# Patient Record
Sex: MALE | Race: WHITE | Employment: UNEMPLOYED | URBAN - METROPOLITAN AREA
[De-identification: names, ages, dates, MRNs, and addresses within clinical notes are randomized per-mention and may not be internally consistent; named-entity substitution may affect disease eponyms.]

---

## 2022-01-01 ENCOUNTER — APPOINTMENT (OUTPATIENT)
Dept: GENERAL RADIOLOGY | Age: 77
DRG: 871 | End: 2022-01-01
Payer: MEDICARE

## 2022-01-01 ENCOUNTER — HOSPITAL ENCOUNTER (INPATIENT)
Age: 77
LOS: 17 days | DRG: 871 | End: 2022-01-31
Attending: EMERGENCY MEDICINE | Admitting: INTERNAL MEDICINE
Payer: MEDICARE

## 2022-01-01 VITALS
TEMPERATURE: 96.6 F | HEIGHT: 74 IN | WEIGHT: 156.97 LBS | BODY MASS INDEX: 20.14 KG/M2 | SYSTOLIC BLOOD PRESSURE: 48 MMHG | DIASTOLIC BLOOD PRESSURE: 32 MMHG | OXYGEN SATURATION: 98 %

## 2022-01-01 DIAGNOSIS — U07.1 COVID-19: Primary | ICD-10-CM

## 2022-01-01 DIAGNOSIS — R53.1 GENERALIZED WEAKNESS: ICD-10-CM

## 2022-01-01 DIAGNOSIS — J96.01 ACUTE RESPIRATORY FAILURE WITH HYPOXIA (HCC): ICD-10-CM

## 2022-01-01 DIAGNOSIS — J18.9 ATYPICAL PNEUMONIA: ICD-10-CM

## 2022-01-01 LAB
A/G RATIO: 0.6 (ref 1.1–2.2)
ABO/RH: NORMAL
ABO/RH: NORMAL
ALBUMIN SERPL-MCNC: 2.6 G/DL (ref 3.4–5)
ALBUMIN SERPL-MCNC: 2.7 G/DL (ref 3.1–4.9)
ALBUMIN SERPL-MCNC: 2.7 G/DL (ref 3.4–5)
ALBUMIN SERPL-MCNC: 2.8 G/DL (ref 3.4–5)
ALBUMIN SERPL-MCNC: 2.8 G/DL (ref 3.4–5)
ALBUMIN SERPL-MCNC: 2.9 G/DL (ref 3.4–5)
ALBUMIN SERPL-MCNC: 3 G/DL (ref 3.4–5)
ALBUMIN SERPL-MCNC: 3.1 G/DL (ref 3.4–5)
ALBUMIN SERPL-MCNC: 3.3 G/DL (ref 3.4–5)
ALP BLD-CCNC: 101 U/L (ref 40–129)
ALP BLD-CCNC: 102 U/L (ref 40–129)
ALP BLD-CCNC: 102 U/L (ref 40–129)
ALP BLD-CCNC: 104 U/L (ref 40–129)
ALP BLD-CCNC: 104 U/L (ref 40–129)
ALP BLD-CCNC: 105 U/L (ref 40–129)
ALP BLD-CCNC: 107 U/L (ref 40–129)
ALP BLD-CCNC: 109 U/L (ref 40–129)
ALP BLD-CCNC: 114 U/L (ref 40–129)
ALP BLD-CCNC: 120 U/L (ref 40–129)
ALP BLD-CCNC: 137 U/L (ref 40–129)
ALP BLD-CCNC: 140 U/L (ref 40–129)
ALP BLD-CCNC: 142 U/L (ref 40–129)
ALP BLD-CCNC: 184 U/L (ref 40–129)
ALP BLD-CCNC: 99 U/L (ref 40–129)
ALPHA-1-GLOBULIN: 0.2 G/DL (ref 0.2–0.4)
ALPHA-2-GLOBULIN: 0.6 G/DL (ref 0.4–1.1)
ALT SERPL-CCNC: 137 U/L (ref 10–40)
ALT SERPL-CCNC: 15 U/L (ref 10–40)
ALT SERPL-CCNC: 17 U/L (ref 10–40)
ALT SERPL-CCNC: 18 U/L (ref 10–40)
ALT SERPL-CCNC: 20 U/L (ref 10–40)
ALT SERPL-CCNC: 20 U/L (ref 10–40)
ALT SERPL-CCNC: 21 U/L (ref 10–40)
ALT SERPL-CCNC: 23 U/L (ref 10–40)
ALT SERPL-CCNC: 23 U/L (ref 10–40)
ALT SERPL-CCNC: 24 U/L (ref 10–40)
ALT SERPL-CCNC: 26 U/L (ref 10–40)
ALT SERPL-CCNC: 28 U/L (ref 10–40)
ALT SERPL-CCNC: 31 U/L (ref 10–40)
ALT SERPL-CCNC: 62 U/L (ref 10–40)
ALT SERPL-CCNC: 78 U/L (ref 10–40)
ANION GAP SERPL CALCULATED.3IONS-SCNC: 10 MMOL/L (ref 3–16)
ANION GAP SERPL CALCULATED.3IONS-SCNC: 12 MMOL/L (ref 3–16)
ANION GAP SERPL CALCULATED.3IONS-SCNC: 12 MMOL/L (ref 3–16)
ANION GAP SERPL CALCULATED.3IONS-SCNC: 13 MMOL/L (ref 3–16)
ANION GAP SERPL CALCULATED.3IONS-SCNC: 14 MMOL/L (ref 3–16)
ANION GAP SERPL CALCULATED.3IONS-SCNC: 15 MMOL/L (ref 3–16)
ANION GAP SERPL CALCULATED.3IONS-SCNC: 16 MMOL/L (ref 3–16)
ANION GAP SERPL CALCULATED.3IONS-SCNC: 18 MMOL/L (ref 3–16)
ANION GAP SERPL CALCULATED.3IONS-SCNC: 23 MMOL/L (ref 3–16)
ANION GAP SERPL CALCULATED.3IONS-SCNC: 7 MMOL/L (ref 3–16)
ANION GAP SERPL CALCULATED.3IONS-SCNC: 8 MMOL/L (ref 3–16)
ANION GAP SERPL CALCULATED.3IONS-SCNC: 8 MMOL/L (ref 3–16)
ANION GAP SERPL CALCULATED.3IONS-SCNC: 9 MMOL/L (ref 3–16)
ANION GAP SERPL CALCULATED.3IONS-SCNC: 9 MMOL/L (ref 3–16)
ANTIBODY SCREEN: NORMAL
ANTIBODY SCREEN: NORMAL
AST SERPL-CCNC: 13 U/L (ref 15–37)
AST SERPL-CCNC: 13 U/L (ref 15–37)
AST SERPL-CCNC: 141 U/L (ref 15–37)
AST SERPL-CCNC: 15 U/L (ref 15–37)
AST SERPL-CCNC: 16 U/L (ref 15–37)
AST SERPL-CCNC: 17 U/L (ref 15–37)
AST SERPL-CCNC: 19 U/L (ref 15–37)
AST SERPL-CCNC: 27 U/L (ref 15–37)
AST SERPL-CCNC: 30 U/L (ref 15–37)
AST SERPL-CCNC: 30 U/L (ref 15–37)
AST SERPL-CCNC: 35 U/L (ref 15–37)
AST SERPL-CCNC: 59 U/L (ref 15–37)
AST SERPL-CCNC: 67 U/L (ref 15–37)
BASE EXCESS ARTERIAL: 5.5 MMOL/L (ref -3–3)
BASOPHILS ABSOLUTE: 0 K/UL (ref 0–0.2)
BASOPHILS ABSOLUTE: 0 K/UL (ref 0–0.2)
BASOPHILS RELATIVE PERCENT: 0.2 %
BASOPHILS RELATIVE PERCENT: 0.2 %
BETA GLOBULIN: 0.9 G/DL (ref 0.9–1.6)
BILIRUB SERPL-MCNC: 0.5 MG/DL (ref 0–1)
BILIRUB SERPL-MCNC: 0.6 MG/DL (ref 0–1)
BILIRUB SERPL-MCNC: 0.7 MG/DL (ref 0–1)
BILIRUB SERPL-MCNC: 0.7 MG/DL (ref 0–1)
BILIRUB SERPL-MCNC: 0.9 MG/DL (ref 0–1)
BILIRUB SERPL-MCNC: 1 MG/DL (ref 0–1)
BILIRUB SERPL-MCNC: 1.1 MG/DL (ref 0–1)
BILIRUB SERPL-MCNC: 1.1 MG/DL (ref 0–1)
BILIRUB SERPL-MCNC: 1.3 MG/DL (ref 0–1)
BILIRUB SERPL-MCNC: 1.4 MG/DL (ref 0–1)
BILIRUB SERPL-MCNC: 1.4 MG/DL (ref 0–1)
BILIRUB SERPL-MCNC: 1.5 MG/DL (ref 0–1)
BILIRUBIN DIRECT: 0.3 MG/DL (ref 0–0.3)
BILIRUBIN DIRECT: 0.4 MG/DL (ref 0–0.3)
BILIRUBIN DIRECT: 0.4 MG/DL (ref 0–0.3)
BILIRUBIN DIRECT: 0.5 MG/DL (ref 0–0.3)
BILIRUBIN DIRECT: 0.5 MG/DL (ref 0–0.3)
BILIRUBIN DIRECT: 0.6 MG/DL (ref 0–0.3)
BILIRUBIN DIRECT: 0.7 MG/DL (ref 0–0.3)
BILIRUBIN DIRECT: 0.8 MG/DL (ref 0–0.3)
BILIRUBIN DIRECT: <0.2 MG/DL (ref 0–0.3)
BILIRUBIN URINE: NEGATIVE
BILIRUBIN, INDIRECT: 0.3 MG/DL (ref 0–1)
BILIRUBIN, INDIRECT: 0.3 MG/DL (ref 0–1)
BILIRUBIN, INDIRECT: 0.4 MG/DL (ref 0–1)
BILIRUBIN, INDIRECT: 0.6 MG/DL (ref 0–1)
BILIRUBIN, INDIRECT: 0.8 MG/DL (ref 0–1)
BILIRUBIN, INDIRECT: ABNORMAL MG/DL (ref 0–1)
BLOOD BANK DISPENSE STATUS: NORMAL
BLOOD BANK DISPENSE STATUS: NORMAL
BLOOD BANK PRODUCT CODE: NORMAL
BLOOD BANK PRODUCT CODE: NORMAL
BLOOD, URINE: NEGATIVE
BPU ID: NORMAL
BPU ID: NORMAL
BUN BLDV-MCNC: 108 MG/DL (ref 7–20)
BUN BLDV-MCNC: 21 MG/DL (ref 7–20)
BUN BLDV-MCNC: 27 MG/DL (ref 7–20)
BUN BLDV-MCNC: 29 MG/DL (ref 7–20)
BUN BLDV-MCNC: 31 MG/DL (ref 7–20)
BUN BLDV-MCNC: 31 MG/DL (ref 7–20)
BUN BLDV-MCNC: 32 MG/DL (ref 7–20)
BUN BLDV-MCNC: 37 MG/DL (ref 7–20)
BUN BLDV-MCNC: 41 MG/DL (ref 7–20)
BUN BLDV-MCNC: 41 MG/DL (ref 7–20)
BUN BLDV-MCNC: 49 MG/DL (ref 7–20)
BUN BLDV-MCNC: 49 MG/DL (ref 7–20)
BUN BLDV-MCNC: 57 MG/DL (ref 7–20)
BUN BLDV-MCNC: 57 MG/DL (ref 7–20)
BUN BLDV-MCNC: 59 MG/DL (ref 7–20)
BUN BLDV-MCNC: 62 MG/DL (ref 7–20)
BUN BLDV-MCNC: 74 MG/DL (ref 7–20)
BUN BLDV-MCNC: 90 MG/DL (ref 7–20)
BUN BLDV-MCNC: 94 MG/DL (ref 7–20)
C-REACTIVE PROTEIN: 384.9 MG/L (ref 0–5.1)
C-REACTIVE PROTEIN: <3 MG/L (ref 0–5.1)
CALCIUM SERPL-MCNC: 7.4 MG/DL (ref 8.3–10.6)
CALCIUM SERPL-MCNC: 7.4 MG/DL (ref 8.3–10.6)
CALCIUM SERPL-MCNC: 7.7 MG/DL (ref 8.3–10.6)
CALCIUM SERPL-MCNC: 7.8 MG/DL (ref 8.3–10.6)
CALCIUM SERPL-MCNC: 7.9 MG/DL (ref 8.3–10.6)
CALCIUM SERPL-MCNC: 7.9 MG/DL (ref 8.3–10.6)
CALCIUM SERPL-MCNC: 8 MG/DL (ref 8.3–10.6)
CALCIUM SERPL-MCNC: 8.1 MG/DL (ref 8.3–10.6)
CALCIUM SERPL-MCNC: 8.1 MG/DL (ref 8.3–10.6)
CALCIUM SERPL-MCNC: 8.3 MG/DL (ref 8.3–10.6)
CALCIUM SERPL-MCNC: 8.4 MG/DL (ref 8.3–10.6)
CALCIUM SERPL-MCNC: 8.6 MG/DL (ref 8.3–10.6)
CALCIUM SERPL-MCNC: 8.7 MG/DL (ref 8.3–10.6)
CALCIUM SERPL-MCNC: 8.8 MG/DL (ref 8.3–10.6)
CALCIUM SERPL-MCNC: 8.9 MG/DL (ref 8.3–10.6)
CARBOXYHEMOGLOBIN ARTERIAL: 1.4 % (ref 0–1.5)
CHLORIDE BLD-SCNC: 100 MMOL/L (ref 99–110)
CHLORIDE BLD-SCNC: 101 MMOL/L (ref 99–110)
CHLORIDE BLD-SCNC: 101 MMOL/L (ref 99–110)
CHLORIDE BLD-SCNC: 102 MMOL/L (ref 99–110)
CHLORIDE BLD-SCNC: 103 MMOL/L (ref 99–110)
CHLORIDE BLD-SCNC: 104 MMOL/L (ref 99–110)
CHLORIDE BLD-SCNC: 96 MMOL/L (ref 99–110)
CHLORIDE BLD-SCNC: 97 MMOL/L (ref 99–110)
CHLORIDE BLD-SCNC: 98 MMOL/L (ref 99–110)
CHLORIDE BLD-SCNC: 98 MMOL/L (ref 99–110)
CHLORIDE BLD-SCNC: 99 MMOL/L (ref 99–110)
CLARITY: ABNORMAL
CO2: 15 MMOL/L (ref 21–32)
CO2: 21 MMOL/L (ref 21–32)
CO2: 22 MMOL/L (ref 21–32)
CO2: 24 MMOL/L (ref 21–32)
CO2: 25 MMOL/L (ref 21–32)
CO2: 26 MMOL/L (ref 21–32)
CO2: 28 MMOL/L (ref 21–32)
CO2: 28 MMOL/L (ref 21–32)
CO2: 29 MMOL/L (ref 21–32)
CO2: 30 MMOL/L (ref 21–32)
CO2: 31 MMOL/L (ref 21–32)
COLOR: YELLOW
CORTISOL - AM: 63.4 UG/DL (ref 4.3–22.4)
CREAT SERPL-MCNC: 0.5 MG/DL (ref 0.8–1.3)
CREAT SERPL-MCNC: 0.6 MG/DL (ref 0.8–1.3)
CREAT SERPL-MCNC: 0.7 MG/DL (ref 0.8–1.3)
CREAT SERPL-MCNC: 0.7 MG/DL (ref 0.8–1.3)
CREAT SERPL-MCNC: 0.8 MG/DL (ref 0.8–1.3)
CREAT SERPL-MCNC: 0.8 MG/DL (ref 0.8–1.3)
CREAT SERPL-MCNC: 0.9 MG/DL (ref 0.8–1.3)
CREAT SERPL-MCNC: 1.1 MG/DL (ref 0.8–1.3)
CREAT SERPL-MCNC: 1.7 MG/DL (ref 0.8–1.3)
CREAT SERPL-MCNC: 2.7 MG/DL (ref 0.8–1.3)
CREAT SERPL-MCNC: 2.8 MG/DL (ref 0.8–1.3)
CREAT SERPL-MCNC: 2.8 MG/DL (ref 0.8–1.3)
CREAT SERPL-MCNC: <0.5 MG/DL (ref 0.8–1.3)
CREAT SERPL-MCNC: <0.5 MG/DL (ref 0.8–1.3)
D DIMER: >5250 NG/ML DDU (ref 0–229)
DESCRIPTION BLOOD BANK: NORMAL
DESCRIPTION BLOOD BANK: NORMAL
EKG ATRIAL RATE: 77 BPM
EKG DIAGNOSIS: NORMAL
EKG P AXIS: 48 DEGREES
EKG P-R INTERVAL: 126 MS
EKG Q-T INTERVAL: 380 MS
EKG QRS DURATION: 66 MS
EKG QTC CALCULATION (BAZETT): 430 MS
EKG R AXIS: 33 DEGREES
EKG T AXIS: 46 DEGREES
EKG VENTRICULAR RATE: 77 BPM
EOSINOPHILS ABSOLUTE: 0 K/UL (ref 0–0.6)
EOSINOPHILS ABSOLUTE: 0 K/UL (ref 0–0.6)
EOSINOPHILS RELATIVE PERCENT: 0 %
EOSINOPHILS RELATIVE PERCENT: 0.3 %
EPITHELIAL CELLS, UA: 0 /HPF (ref 0–5)
ESTIMATED AVERAGE GLUCOSE: 111.2 MG/DL
ESTIMATED AVERAGE GLUCOSE: 116.9 MG/DL
FERRITIN: 613.2 NG/ML (ref 30–400)
FOLATE: 14.62 NG/ML (ref 4.78–24.2)
FOLATE: 9.63 NG/ML (ref 4.78–24.2)
GAMMA GLOBULIN: 1 G/DL (ref 0.6–1.8)
GFR AFRICAN AMERICAN: 27
GFR AFRICAN AMERICAN: 27
GFR AFRICAN AMERICAN: 28
GFR AFRICAN AMERICAN: 47
GFR AFRICAN AMERICAN: >60
GFR NON-AFRICAN AMERICAN: 22
GFR NON-AFRICAN AMERICAN: 22
GFR NON-AFRICAN AMERICAN: 23
GFR NON-AFRICAN AMERICAN: 39
GFR NON-AFRICAN AMERICAN: >60
GLUCOSE BLD-MCNC: 116 MG/DL (ref 70–99)
GLUCOSE BLD-MCNC: 116 MG/DL (ref 70–99)
GLUCOSE BLD-MCNC: 117 MG/DL (ref 70–99)
GLUCOSE BLD-MCNC: 121 MG/DL (ref 70–99)
GLUCOSE BLD-MCNC: 122 MG/DL (ref 70–99)
GLUCOSE BLD-MCNC: 122 MG/DL (ref 70–99)
GLUCOSE BLD-MCNC: 127 MG/DL (ref 70–99)
GLUCOSE BLD-MCNC: 129 MG/DL (ref 70–99)
GLUCOSE BLD-MCNC: 137 MG/DL (ref 70–99)
GLUCOSE BLD-MCNC: 142 MG/DL (ref 70–99)
GLUCOSE BLD-MCNC: 150 MG/DL (ref 70–99)
GLUCOSE BLD-MCNC: 151 MG/DL (ref 70–99)
GLUCOSE BLD-MCNC: 154 MG/DL (ref 70–99)
GLUCOSE BLD-MCNC: 158 MG/DL (ref 70–99)
GLUCOSE BLD-MCNC: 158 MG/DL (ref 70–99)
GLUCOSE BLD-MCNC: 164 MG/DL (ref 70–99)
GLUCOSE BLD-MCNC: 176 MG/DL (ref 70–99)
GLUCOSE BLD-MCNC: 179 MG/DL (ref 70–99)
GLUCOSE BLD-MCNC: 179 MG/DL (ref 70–99)
GLUCOSE BLD-MCNC: 182 MG/DL (ref 70–99)
GLUCOSE BLD-MCNC: 201 MG/DL (ref 70–99)
GLUCOSE BLD-MCNC: 212 MG/DL (ref 70–99)
GLUCOSE BLD-MCNC: 227 MG/DL (ref 70–99)
GLUCOSE URINE: NEGATIVE MG/DL
HAPTOGLOBIN: 126 MG/DL (ref 30–200)
HBA1C MFR BLD: 5.5 %
HBA1C MFR BLD: 5.7 %
HCO3 ARTERIAL: 29.2 MMOL/L (ref 21–29)
HCT VFR BLD CALC: 18.5 % (ref 40.5–52.5)
HCT VFR BLD CALC: 18.6 % (ref 40.5–52.5)
HCT VFR BLD CALC: 19.6 % (ref 40.5–52.5)
HCT VFR BLD CALC: 19.8 % (ref 40.5–52.5)
HCT VFR BLD CALC: 20.2 % (ref 40.5–52.5)
HCT VFR BLD CALC: 20.5 % (ref 40.5–52.5)
HCT VFR BLD CALC: 20.6 % (ref 40.5–52.5)
HCT VFR BLD CALC: 21 % (ref 40.5–52.5)
HCT VFR BLD CALC: 21 % (ref 40.5–52.5)
HCT VFR BLD CALC: 21.4 % (ref 40.5–52.5)
HCT VFR BLD CALC: 21.6 % (ref 40.5–52.5)
HCT VFR BLD CALC: 21.9 % (ref 40.5–52.5)
HCT VFR BLD CALC: 22.4 % (ref 40.5–52.5)
HCT VFR BLD CALC: 22.7 % (ref 40.5–52.5)
HCT VFR BLD CALC: 22.7 % (ref 40.5–52.5)
HCT VFR BLD CALC: 23.1 % (ref 40.5–52.5)
HCT VFR BLD CALC: 23.2 % (ref 40.5–52.5)
HCT VFR BLD CALC: 24.3 % (ref 40.5–52.5)
HCT VFR BLD CALC: 26.1 % (ref 40.5–52.5)
HCT VFR BLD CALC: 26.5 % (ref 40.5–52.5)
HCT VFR BLD CALC: 26.9 % (ref 40.5–52.5)
HCT VFR BLD CALC: 27.8 % (ref 40.5–52.5)
HCT VFR BLD CALC: 29.5 % (ref 40.5–52.5)
HEMATOLOGY PATH CONSULT: NO
HEMATOLOGY PATH CONSULT: NORMAL
HEMATOLOGY PATH CONSULT: YES
HEMOGLOBIN, ART, EXTENDED: <5 G/DL (ref 13.5–17.5)
HEMOGLOBIN: 10 G/DL (ref 13.5–17.5)
HEMOGLOBIN: 6.4 G/DL (ref 13.5–17.5)
HEMOGLOBIN: 6.7 G/DL (ref 13.5–17.5)
HEMOGLOBIN: 6.8 G/DL (ref 13.5–17.5)
HEMOGLOBIN: 6.9 G/DL (ref 13.5–17.5)
HEMOGLOBIN: 7 G/DL (ref 13.5–17.5)
HEMOGLOBIN: 7 G/DL (ref 13.5–17.5)
HEMOGLOBIN: 7.1 G/DL (ref 13.5–17.5)
HEMOGLOBIN: 7.3 G/DL (ref 13.5–17.5)
HEMOGLOBIN: 7.4 G/DL (ref 13.5–17.5)
HEMOGLOBIN: 7.6 G/DL (ref 13.5–17.5)
HEMOGLOBIN: 7.6 G/DL (ref 13.5–17.5)
HEMOGLOBIN: 7.7 G/DL (ref 13.5–17.5)
HEMOGLOBIN: 7.7 G/DL (ref 13.5–17.5)
HEMOGLOBIN: 7.8 G/DL (ref 13.5–17.5)
HEMOGLOBIN: 7.8 G/DL (ref 13.5–17.5)
HEMOGLOBIN: 8.3 G/DL (ref 13.5–17.5)
HEMOGLOBIN: 8.7 G/DL (ref 13.5–17.5)
HEMOGLOBIN: 8.9 G/DL (ref 13.5–17.5)
HEMOGLOBIN: 9.1 G/DL (ref 13.5–17.5)
HEMOGLOBIN: 9.5 G/DL (ref 13.5–17.5)
HYALINE CASTS: 1 /LPF (ref 0–8)
IMMATURE RETIC FRACT: 0.48 (ref 0.21–0.37)
IMMATURE RETIC FRACT: 0.54 (ref 0.21–0.37)
INR BLD: 1.44 (ref 0.88–1.12)
IRON SATURATION: 44 % (ref 20–50)
IRON: 102 UG/DL (ref 59–158)
KETONES, URINE: NEGATIVE MG/DL
LACTIC ACID: 2.4 MMOL/L (ref 0.4–2)
LACTIC ACID: 2.6 MMOL/L (ref 0.4–2)
LACTIC ACID: 3.4 MMOL/L (ref 0.4–2)
LEUKOCYTE ESTERASE, URINE: NEGATIVE
LIPASE: 16 U/L (ref 13–60)
LIPASE: 18 U/L (ref 13–60)
LYMPHOCYTES ABSOLUTE: 0.4 K/UL (ref 1–5.1)
LYMPHOCYTES ABSOLUTE: 0.8 K/UL (ref 1–5.1)
LYMPHOCYTES RELATIVE PERCENT: 3.3 %
LYMPHOCYTES RELATIVE PERCENT: 5 %
M SPIKE 1 SERUM PROTEIN ELEC: 0.3 G/DL
MAGNESIUM: 2.9 MG/DL (ref 1.8–2.4)
MAGNESIUM: 2.9 MG/DL (ref 1.8–2.4)
MCH RBC QN AUTO: 35.6 PG (ref 26–34)
MCH RBC QN AUTO: 35.6 PG (ref 26–34)
MCH RBC QN AUTO: 35.8 PG (ref 26–34)
MCH RBC QN AUTO: 35.9 PG (ref 26–34)
MCH RBC QN AUTO: 36 PG (ref 26–34)
MCH RBC QN AUTO: 36.5 PG (ref 26–34)
MCH RBC QN AUTO: 36.8 PG (ref 26–34)
MCH RBC QN AUTO: 36.8 PG (ref 26–34)
MCH RBC QN AUTO: 37.1 PG (ref 26–34)
MCH RBC QN AUTO: 37.1 PG (ref 26–34)
MCH RBC QN AUTO: 37.5 PG (ref 26–34)
MCH RBC QN AUTO: 37.6 PG (ref 26–34)
MCH RBC QN AUTO: 37.9 PG (ref 26–34)
MCH RBC QN AUTO: 38 PG (ref 26–34)
MCH RBC QN AUTO: 38 PG (ref 26–34)
MCH RBC QN AUTO: 38.2 PG (ref 26–34)
MCH RBC QN AUTO: 39.9 PG (ref 26–34)
MCH RBC QN AUTO: 40.4 PG (ref 26–34)
MCH RBC QN AUTO: 40.7 PG (ref 26–34)
MCHC RBC AUTO-ENTMCNC: 32.4 G/DL (ref 31–36)
MCHC RBC AUTO-ENTMCNC: 33 G/DL (ref 31–36)
MCHC RBC AUTO-ENTMCNC: 33 G/DL (ref 31–36)
MCHC RBC AUTO-ENTMCNC: 33.7 G/DL (ref 31–36)
MCHC RBC AUTO-ENTMCNC: 33.9 G/DL (ref 31–36)
MCHC RBC AUTO-ENTMCNC: 33.9 G/DL (ref 31–36)
MCHC RBC AUTO-ENTMCNC: 34.1 G/DL (ref 31–36)
MCHC RBC AUTO-ENTMCNC: 34.2 G/DL (ref 31–36)
MCHC RBC AUTO-ENTMCNC: 34.4 G/DL (ref 31–36)
MCHC RBC AUTO-ENTMCNC: 34.4 G/DL (ref 31–36)
MCHC RBC AUTO-ENTMCNC: 34.5 G/DL (ref 31–36)
MCHC RBC AUTO-ENTMCNC: 34.7 G/DL (ref 31–36)
MCHC RBC AUTO-ENTMCNC: 34.7 G/DL (ref 31–36)
MCHC RBC AUTO-ENTMCNC: 34.9 G/DL (ref 31–36)
MCV RBC AUTO: 104.2 FL (ref 80–100)
MCV RBC AUTO: 104.6 FL (ref 80–100)
MCV RBC AUTO: 105.6 FL (ref 80–100)
MCV RBC AUTO: 105.9 FL (ref 80–100)
MCV RBC AUTO: 108.5 FL (ref 80–100)
MCV RBC AUTO: 109 FL (ref 80–100)
MCV RBC AUTO: 109.5 FL (ref 80–100)
MCV RBC AUTO: 109.7 FL (ref 80–100)
MCV RBC AUTO: 109.7 FL (ref 80–100)
MCV RBC AUTO: 109.9 FL (ref 80–100)
MCV RBC AUTO: 110 FL (ref 80–100)
MCV RBC AUTO: 110.1 FL (ref 80–100)
MCV RBC AUTO: 110.2 FL (ref 80–100)
MCV RBC AUTO: 110.4 FL (ref 80–100)
MCV RBC AUTO: 110.8 FL (ref 80–100)
MCV RBC AUTO: 112.3 FL (ref 80–100)
MCV RBC AUTO: 117.1 FL (ref 80–100)
MCV RBC AUTO: 117.2 FL (ref 80–100)
MCV RBC AUTO: 118.4 FL (ref 80–100)
METHEMOGLOBIN ARTERIAL: 1.6 %
MICROSCOPIC EXAMINATION: YES
MONOCYTES ABSOLUTE: 0.8 K/UL (ref 0–1.3)
MONOCYTES ABSOLUTE: 0.9 K/UL (ref 0–1.3)
MONOCYTES RELATIVE PERCENT: 5.2 %
MONOCYTES RELATIVE PERCENT: 6.3 %
NEUTROPHILS ABSOLUTE: 12.2 K/UL (ref 1.7–7.7)
NEUTROPHILS ABSOLUTE: 14.2 K/UL (ref 1.7–7.7)
NEUTROPHILS RELATIVE PERCENT: 89.3 %
NEUTROPHILS RELATIVE PERCENT: 90.2 %
NITRITE, URINE: NEGATIVE
O2 SAT, ARTERIAL: >100 %
O2 THERAPY: ABNORMAL
OCCULT BLOOD DIAGNOSTIC: NORMAL
PCO2 ARTERIAL: 36.7 MMHG (ref 35–45)
PDW BLD-RTO: 15.1 % (ref 12.4–15.4)
PDW BLD-RTO: 15.2 % (ref 12.4–15.4)
PDW BLD-RTO: 15.4 % (ref 12.4–15.4)
PDW BLD-RTO: 20.3 % (ref 12.4–15.4)
PDW BLD-RTO: 20.3 % (ref 12.4–15.4)
PDW BLD-RTO: 20.4 % (ref 12.4–15.4)
PDW BLD-RTO: 20.7 % (ref 12.4–15.4)
PDW BLD-RTO: 20.7 % (ref 12.4–15.4)
PDW BLD-RTO: 21 % (ref 12.4–15.4)
PDW BLD-RTO: 21 % (ref 12.4–15.4)
PDW BLD-RTO: 24.4 % (ref 12.4–15.4)
PDW BLD-RTO: 24.5 % (ref 12.4–15.4)
PDW BLD-RTO: 24.8 % (ref 12.4–15.4)
PDW BLD-RTO: 25 % (ref 12.4–15.4)
PDW BLD-RTO: 25.4 % (ref 12.4–15.4)
PDW BLD-RTO: 26.2 % (ref 12.4–15.4)
PDW BLD-RTO: 26.6 % (ref 12.4–15.4)
PDW BLD-RTO: 26.8 % (ref 12.4–15.4)
PDW BLD-RTO: 26.9 % (ref 12.4–15.4)
PERFORMED ON: ABNORMAL
PH ARTERIAL: 7.51 (ref 7.35–7.45)
PH UA: 5.5 (ref 5–8)
PLATELET # BLD: 126 K/UL (ref 135–450)
PLATELET # BLD: 132 K/UL (ref 135–450)
PLATELET # BLD: 136 K/UL (ref 135–450)
PLATELET # BLD: 136 K/UL (ref 135–450)
PLATELET # BLD: 149 K/UL (ref 135–450)
PLATELET # BLD: 158 K/UL (ref 135–450)
PLATELET # BLD: 159 K/UL (ref 135–450)
PLATELET # BLD: 168 K/UL (ref 135–450)
PLATELET # BLD: 179 K/UL (ref 135–450)
PLATELET # BLD: 184 K/UL (ref 135–450)
PLATELET # BLD: 186 K/UL (ref 135–450)
PLATELET # BLD: 188 K/UL (ref 135–450)
PLATELET # BLD: 204 K/UL (ref 135–450)
PLATELET # BLD: 209 K/UL (ref 135–450)
PLATELET # BLD: 214 K/UL (ref 135–450)
PLATELET # BLD: 217 K/UL (ref 135–450)
PLATELET # BLD: 228 K/UL (ref 135–450)
PLATELET # BLD: 251 K/UL (ref 135–450)
PLATELET # BLD: 295 K/UL (ref 135–450)
PLATELET SLIDE REVIEW: ADEQUATE
PMV BLD AUTO: 8 FL (ref 5–10.5)
PMV BLD AUTO: 8.3 FL (ref 5–10.5)
PMV BLD AUTO: 8.4 FL (ref 5–10.5)
PMV BLD AUTO: 8.5 FL (ref 5–10.5)
PMV BLD AUTO: 8.6 FL (ref 5–10.5)
PMV BLD AUTO: 8.6 FL (ref 5–10.5)
PMV BLD AUTO: 8.7 FL (ref 5–10.5)
PMV BLD AUTO: 8.8 FL (ref 5–10.5)
PMV BLD AUTO: 8.8 FL (ref 5–10.5)
PMV BLD AUTO: 8.9 FL (ref 5–10.5)
PMV BLD AUTO: 9 FL (ref 5–10.5)
PMV BLD AUTO: 9 FL (ref 5–10.5)
PMV BLD AUTO: 9.1 FL (ref 5–10.5)
PMV BLD AUTO: 9.2 FL (ref 5–10.5)
PMV BLD AUTO: 9.5 FL (ref 5–10.5)
PO2 ARTERIAL: 157 MMHG (ref 75–108)
POTASSIUM REFLEX MAGNESIUM: 3.2 MMOL/L (ref 3.5–5.1)
POTASSIUM REFLEX MAGNESIUM: 3.5 MMOL/L (ref 3.5–5.1)
POTASSIUM REFLEX MAGNESIUM: 3.6 MMOL/L (ref 3.5–5.1)
POTASSIUM REFLEX MAGNESIUM: 3.6 MMOL/L (ref 3.5–5.1)
POTASSIUM REFLEX MAGNESIUM: 3.8 MMOL/L (ref 3.5–5.1)
POTASSIUM REFLEX MAGNESIUM: 4 MMOL/L (ref 3.5–5.1)
POTASSIUM REFLEX MAGNESIUM: 4.1 MMOL/L (ref 3.5–5.1)
POTASSIUM REFLEX MAGNESIUM: 4.3 MMOL/L (ref 3.5–5.1)
POTASSIUM REFLEX MAGNESIUM: 4.3 MMOL/L (ref 3.5–5.1)
POTASSIUM REFLEX MAGNESIUM: 4.4 MMOL/L (ref 3.5–5.1)
POTASSIUM REFLEX MAGNESIUM: 4.4 MMOL/L (ref 3.5–5.1)
POTASSIUM REFLEX MAGNESIUM: 4.5 MMOL/L (ref 3.5–5.1)
POTASSIUM REFLEX MAGNESIUM: 4.5 MMOL/L (ref 3.5–5.1)
POTASSIUM REFLEX MAGNESIUM: 4.7 MMOL/L (ref 3.5–5.1)
POTASSIUM REFLEX MAGNESIUM: 4.7 MMOL/L (ref 3.5–5.1)
POTASSIUM REFLEX MAGNESIUM: 4.8 MMOL/L (ref 3.5–5.1)
POTASSIUM REFLEX MAGNESIUM: 5.2 MMOL/L (ref 3.5–5.1)
POTASSIUM REFLEX MAGNESIUM: 5.3 MMOL/L (ref 3.5–5.1)
POTASSIUM SERPL-SCNC: 5.3 MMOL/L (ref 3.5–5.1)
PRO-BNP: 1225 PG/ML (ref 0–449)
PRO-BNP: 1319 PG/ML (ref 0–449)
PRO-BNP: 952 PG/ML (ref 0–449)
PROCALCITONIN: 0.23 NG/ML (ref 0–0.15)
PROCALCITONIN: 0.24 NG/ML (ref 0–0.15)
PROCALCITONIN: 0.98 NG/ML (ref 0–0.15)
PROCALCITONIN: 1.88 NG/ML (ref 0–0.15)
PROTEIN UA: NEGATIVE MG/DL
PROTHROMBIN TIME: 16.5 SEC (ref 9.9–12.7)
RBC # BLD: 1.67 M/UL (ref 4.2–5.9)
RBC # BLD: 1.68 M/UL (ref 4.2–5.9)
RBC # BLD: 1.75 M/UL (ref 4.2–5.9)
RBC # BLD: 1.83 M/UL (ref 4.2–5.9)
RBC # BLD: 1.87 M/UL (ref 4.2–5.9)
RBC # BLD: 1.88 M/UL (ref 4.2–5.9)
RBC # BLD: 1.95 M/UL (ref 4.2–5.9)
RBC # BLD: 1.96 M/UL (ref 4.2–5.9)
RBC # BLD: 1.97 M/UL (ref 4.2–5.9)
RBC # BLD: 2 M/UL (ref 4.2–5.9)
RBC # BLD: 2.04 M/UL (ref 4.2–5.9)
RBC # BLD: 2.06 M/UL (ref 4.2–5.9)
RBC # BLD: 2.08 M/UL (ref 4.2–5.9)
RBC # BLD: 2.33 M/UL (ref 4.2–5.9)
RBC # BLD: 2.39 M/UL (ref 4.2–5.9)
RBC # BLD: 2.5 M/UL (ref 4.2–5.9)
RBC # BLD: 2.54 M/UL (ref 4.2–5.9)
RBC # BLD: 2.64 M/UL (ref 4.2–5.9)
RBC # BLD: 2.72 M/UL (ref 4.2–5.9)
RBC UA: 1 /HPF (ref 0–4)
REASON FOR REJECTION: NORMAL
REJECTED TEST: NORMAL
RETICULOCYTE ABSOLUTE COUNT: 0.02 M/UL
RETICULOCYTE ABSOLUTE COUNT: 0.03 M/UL
RETICULOCYTE COUNT PCT: 1.06 % (ref 0.5–2.18)
RETICULOCYTE COUNT PCT: 1.62 % (ref 0.5–2.18)
SARS-COV-2, NAAT: DETECTED
SLIDE REVIEW: ABNORMAL
SODIUM BLD-SCNC: 136 MMOL/L (ref 136–145)
SODIUM BLD-SCNC: 137 MMOL/L (ref 136–145)
SODIUM BLD-SCNC: 138 MMOL/L (ref 136–145)
SODIUM BLD-SCNC: 139 MMOL/L (ref 136–145)
SODIUM BLD-SCNC: 140 MMOL/L (ref 136–145)
SODIUM BLD-SCNC: 141 MMOL/L (ref 136–145)
SODIUM BLD-SCNC: 142 MMOL/L (ref 136–145)
SODIUM BLD-SCNC: 143 MMOL/L (ref 136–145)
SODIUM BLD-SCNC: 144 MMOL/L (ref 136–145)
SPE/IFE INTERPRETATION: NORMAL
SPECIFIC GRAVITY UA: 1.01 (ref 1–1.03)
TCO2 ARTERIAL: 30.3 MMOL/L
TOTAL CK: 24 U/L (ref 39–308)
TOTAL IRON BINDING CAPACITY: 232 UG/DL (ref 260–445)
TOTAL PROTEIN: 4.9 G/DL (ref 6.4–8.2)
TOTAL PROTEIN: 4.9 G/DL (ref 6.4–8.2)
TOTAL PROTEIN: 5 G/DL (ref 6.4–8.2)
TOTAL PROTEIN: 5 G/DL (ref 6.4–8.2)
TOTAL PROTEIN: 5.1 G/DL (ref 6.4–8.2)
TOTAL PROTEIN: 5.1 G/DL (ref 6.4–8.2)
TOTAL PROTEIN: 5.2 G/DL (ref 6.4–8.2)
TOTAL PROTEIN: 5.3 G/DL (ref 6.4–8.2)
TOTAL PROTEIN: 5.3 G/DL (ref 6.4–8.2)
TOTAL PROTEIN: 5.4 G/DL (ref 6.4–8.2)
TOTAL PROTEIN: 5.4 G/DL (ref 6.4–8.2)
TOTAL PROTEIN: 5.6 G/DL (ref 6.4–8.2)
TOTAL PROTEIN: 5.7 G/DL (ref 6.4–8.2)
TOTAL PROTEIN: 5.9 G/DL (ref 6.4–8.2)
TOTAL PROTEIN: 5.9 G/DL (ref 6.4–8.2)
TOTAL PROTEIN: 6.9 G/DL (ref 6.4–8.2)
TROPONIN: 0.02 NG/ML
TROPONIN: 0.04 NG/ML
TROPONIN: 0.04 NG/ML
TSH REFLEX FT4: 0.98 UIU/ML (ref 0.27–4.2)
URIC ACID, SERUM: 12.2 MG/DL (ref 3.5–7.2)
URINE REFLEX TO CULTURE: ABNORMAL
URINE TYPE: ABNORMAL
UROBILINOGEN, URINE: 0.2 E.U./DL
VITAMIN B-12: 454 PG/ML (ref 211–911)
VITAMIN B-12: 503 PG/ML (ref 211–911)
WBC # BLD: 10 K/UL (ref 4–11)
WBC # BLD: 10 K/UL (ref 4–11)
WBC # BLD: 10.1 K/UL (ref 4–11)
WBC # BLD: 10.3 K/UL (ref 4–11)
WBC # BLD: 13.1 K/UL (ref 4–11)
WBC # BLD: 13.6 K/UL (ref 4–11)
WBC # BLD: 15.1 K/UL (ref 4–11)
WBC # BLD: 15.9 K/UL (ref 4–11)
WBC # BLD: 4.6 K/UL (ref 4–11)
WBC # BLD: 4.6 K/UL (ref 4–11)
WBC # BLD: 5.4 K/UL (ref 4–11)
WBC # BLD: 5.9 K/UL (ref 4–11)
WBC # BLD: 6.7 K/UL (ref 4–11)
WBC # BLD: 7.4 K/UL (ref 4–11)
WBC # BLD: 7.5 K/UL (ref 4–11)
WBC # BLD: 8.2 K/UL (ref 4–11)
WBC # BLD: 8.4 K/UL (ref 4–11)
WBC # BLD: 9 K/UL (ref 4–11)
WBC # BLD: 9.9 K/UL (ref 4–11)
WBC UA: 0 /HPF (ref 0–5)

## 2022-01-01 PROCEDURE — 6370000000 HC RX 637 (ALT 250 FOR IP): Performed by: INTERNAL MEDICINE

## 2022-01-01 PROCEDURE — 6360000002 HC RX W HCPCS: Performed by: INTERNAL MEDICINE

## 2022-01-01 PROCEDURE — 80048 BASIC METABOLIC PNL TOTAL CA: CPT

## 2022-01-01 PROCEDURE — 94761 N-INVAS EAR/PLS OXIMETRY MLT: CPT

## 2022-01-01 PROCEDURE — 2700000000 HC OXYGEN THERAPY PER DAY

## 2022-01-01 PROCEDURE — 86900 BLOOD TYPING SEROLOGIC ABO: CPT

## 2022-01-01 PROCEDURE — 84145 PROCALCITONIN (PCT): CPT

## 2022-01-01 PROCEDURE — 86901 BLOOD TYPING SEROLOGIC RH(D): CPT

## 2022-01-01 PROCEDURE — 82803 BLOOD GASES ANY COMBINATION: CPT

## 2022-01-01 PROCEDURE — 83036 HEMOGLOBIN GLYCOSYLATED A1C: CPT

## 2022-01-01 PROCEDURE — 71045 X-RAY EXAM CHEST 1 VIEW: CPT

## 2022-01-01 PROCEDURE — 86140 C-REACTIVE PROTEIN: CPT

## 2022-01-01 PROCEDURE — 85045 AUTOMATED RETICULOCYTE COUNT: CPT

## 2022-01-01 PROCEDURE — 36415 COLL VENOUS BLD VENIPUNCTURE: CPT

## 2022-01-01 PROCEDURE — 2060000000 HC ICU INTERMEDIATE R&B

## 2022-01-01 PROCEDURE — 86923 COMPATIBILITY TEST ELECTRIC: CPT

## 2022-01-01 PROCEDURE — 97530 THERAPEUTIC ACTIVITIES: CPT

## 2022-01-01 PROCEDURE — 94660 CPAP INITIATION&MGMT: CPT

## 2022-01-01 PROCEDURE — 2580000003 HC RX 258: Performed by: INTERNAL MEDICINE

## 2022-01-01 PROCEDURE — 83605 ASSAY OF LACTIC ACID: CPT

## 2022-01-01 PROCEDURE — 99291 CRITICAL CARE FIRST HOUR: CPT | Performed by: INTERNAL MEDICINE

## 2022-01-01 PROCEDURE — 86850 RBC ANTIBODY SCREEN: CPT

## 2022-01-01 PROCEDURE — 99284 EMERGENCY DEPT VISIT MOD MDM: CPT

## 2022-01-01 PROCEDURE — 80076 HEPATIC FUNCTION PANEL: CPT

## 2022-01-01 PROCEDURE — 6360000002 HC RX W HCPCS: Performed by: STUDENT IN AN ORGANIZED HEALTH CARE EDUCATION/TRAINING PROGRAM

## 2022-01-01 PROCEDURE — 9990000010 HC NO CHARGE VISIT

## 2022-01-01 PROCEDURE — 85014 HEMATOCRIT: CPT

## 2022-01-01 PROCEDURE — 6370000000 HC RX 637 (ALT 250 FOR IP): Performed by: STUDENT IN AN ORGANIZED HEALTH CARE EDUCATION/TRAINING PROGRAM

## 2022-01-01 PROCEDURE — 93010 ELECTROCARDIOGRAM REPORT: CPT | Performed by: INTERNAL MEDICINE

## 2022-01-01 PROCEDURE — 85610 PROTHROMBIN TIME: CPT

## 2022-01-01 PROCEDURE — 85027 COMPLETE CBC AUTOMATED: CPT

## 2022-01-01 PROCEDURE — 83540 ASSAY OF IRON: CPT

## 2022-01-01 PROCEDURE — P9016 RBC LEUKOCYTES REDUCED: HCPCS

## 2022-01-01 PROCEDURE — 2000000000 HC ICU R&B

## 2022-01-01 PROCEDURE — 92610 EVALUATE SWALLOWING FUNCTION: CPT

## 2022-01-01 PROCEDURE — 02HV33Z INSERTION OF INFUSION DEVICE INTO SUPERIOR VENA CAVA, PERCUTANEOUS APPROACH: ICD-10-PCS | Performed by: STUDENT IN AN ORGANIZED HEALTH CARE EDUCATION/TRAINING PROGRAM

## 2022-01-01 PROCEDURE — 82607 VITAMIN B-12: CPT

## 2022-01-01 PROCEDURE — 1200000000 HC SEMI PRIVATE

## 2022-01-01 PROCEDURE — 2500000003 HC RX 250 WO HCPCS: Performed by: NURSE PRACTITIONER

## 2022-01-01 PROCEDURE — 81001 URINALYSIS AUTO W/SCOPE: CPT

## 2022-01-01 PROCEDURE — 84155 ASSAY OF PROTEIN SERUM: CPT

## 2022-01-01 PROCEDURE — 2500000003 HC RX 250 WO HCPCS: Performed by: INTERNAL MEDICINE

## 2022-01-01 PROCEDURE — 82533 TOTAL CORTISOL: CPT

## 2022-01-01 PROCEDURE — 36592 COLLECT BLOOD FROM PICC: CPT

## 2022-01-01 PROCEDURE — 83880 ASSAY OF NATRIURETIC PEPTIDE: CPT

## 2022-01-01 PROCEDURE — 83010 ASSAY OF HAPTOGLOBIN QUANT: CPT

## 2022-01-01 PROCEDURE — 6360000002 HC RX W HCPCS: Performed by: EMERGENCY MEDICINE

## 2022-01-01 PROCEDURE — M0249 HC ADM TOCILIZU COVID-19 1ST: HCPCS

## 2022-01-01 PROCEDURE — 2580000003 HC RX 258: Performed by: STUDENT IN AN ORGANIZED HEALTH CARE EDUCATION/TRAINING PROGRAM

## 2022-01-01 PROCEDURE — 84484 ASSAY OF TROPONIN QUANT: CPT

## 2022-01-01 PROCEDURE — 97129 THER IVNTJ 1ST 15 MIN: CPT

## 2022-01-01 PROCEDURE — 51702 INSERT TEMP BLADDER CATH: CPT

## 2022-01-01 PROCEDURE — 36600 WITHDRAWAL OF ARTERIAL BLOOD: CPT

## 2022-01-01 PROCEDURE — 83735 ASSAY OF MAGNESIUM: CPT

## 2022-01-01 PROCEDURE — 82270 OCCULT BLOOD FECES: CPT

## 2022-01-01 PROCEDURE — 84550 ASSAY OF BLOOD/URIC ACID: CPT

## 2022-01-01 PROCEDURE — 36569 INSJ PICC 5 YR+ W/O IMAGING: CPT

## 2022-01-01 PROCEDURE — 83690 ASSAY OF LIPASE: CPT

## 2022-01-01 PROCEDURE — 6370000000 HC RX 637 (ALT 250 FOR IP): Performed by: NURSE PRACTITIONER

## 2022-01-01 PROCEDURE — 93005 ELECTROCARDIOGRAM TRACING: CPT | Performed by: EMERGENCY MEDICINE

## 2022-01-01 PROCEDURE — 97166 OT EVAL MOD COMPLEX 45 MIN: CPT

## 2022-01-01 PROCEDURE — 85025 COMPLETE CBC W/AUTO DIFF WBC: CPT

## 2022-01-01 PROCEDURE — C1751 CATH, INF, PER/CENT/MIDLINE: HCPCS

## 2022-01-01 PROCEDURE — 97110 THERAPEUTIC EXERCISES: CPT

## 2022-01-01 PROCEDURE — 82550 ASSAY OF CK (CPK): CPT

## 2022-01-01 PROCEDURE — 80053 COMPREHEN METABOLIC PANEL: CPT

## 2022-01-01 PROCEDURE — 82746 ASSAY OF FOLIC ACID SERUM: CPT

## 2022-01-01 PROCEDURE — 84443 ASSAY THYROID STIM HORMONE: CPT

## 2022-01-01 PROCEDURE — 85379 FIBRIN DEGRADATION QUANT: CPT

## 2022-01-01 PROCEDURE — 76937 US GUIDE VASCULAR ACCESS: CPT

## 2022-01-01 PROCEDURE — 97535 SELF CARE MNGMENT TRAINING: CPT

## 2022-01-01 PROCEDURE — 87635 SARS-COV-2 COVID-19 AMP PRB: CPT

## 2022-01-01 PROCEDURE — 92526 ORAL FUNCTION THERAPY: CPT

## 2022-01-01 PROCEDURE — 97116 GAIT TRAINING THERAPY: CPT

## 2022-01-01 PROCEDURE — 2500000003 HC RX 250 WO HCPCS: Performed by: STUDENT IN AN ORGANIZED HEALTH CARE EDUCATION/TRAINING PROGRAM

## 2022-01-01 PROCEDURE — 83550 IRON BINDING TEST: CPT

## 2022-01-01 PROCEDURE — 85018 HEMOGLOBIN: CPT

## 2022-01-01 PROCEDURE — 84165 PROTEIN E-PHORESIS SERUM: CPT

## 2022-01-01 PROCEDURE — 96374 THER/PROPH/DIAG INJ IV PUSH: CPT

## 2022-01-01 PROCEDURE — 82728 ASSAY OF FERRITIN: CPT

## 2022-01-01 PROCEDURE — 36430 TRANSFUSION BLD/BLD COMPNT: CPT

## 2022-01-01 PROCEDURE — 97162 PT EVAL MOD COMPLEX 30 MIN: CPT

## 2022-01-01 RX ORDER — MORPHINE SULFATE 2 MG/ML
2 INJECTION, SOLUTION INTRAMUSCULAR; INTRAVENOUS
Status: DISCONTINUED | OUTPATIENT
Start: 2022-01-01 | End: 2022-01-01

## 2022-01-01 RX ORDER — LIDOCAINE HYDROCHLORIDE 20 MG/ML
JELLY TOPICAL PRN
Status: DISCONTINUED | OUTPATIENT
Start: 2022-01-01 | End: 2022-02-01 | Stop reason: HOSPADM

## 2022-01-01 RX ORDER — SODIUM CHLORIDE 9 MG/ML
25 INJECTION, SOLUTION INTRAVENOUS PRN
Status: DISCONTINUED | OUTPATIENT
Start: 2022-01-01 | End: 2022-02-01 | Stop reason: HOSPADM

## 2022-01-01 RX ORDER — FLUTICASONE PROPIONATE 50 MCG
1 SPRAY, SUSPENSION (ML) NASAL DAILY
Status: DISCONTINUED | OUTPATIENT
Start: 2022-01-01 | End: 2022-02-01 | Stop reason: HOSPADM

## 2022-01-01 RX ORDER — SODIUM CHLORIDE, SODIUM LACTATE, POTASSIUM CHLORIDE, CALCIUM CHLORIDE 600; 310; 30; 20 MG/100ML; MG/100ML; MG/100ML; MG/100ML
INJECTION, SOLUTION INTRAVENOUS CONTINUOUS
Status: DISCONTINUED | OUTPATIENT
Start: 2022-01-01 | End: 2022-01-01

## 2022-01-01 RX ORDER — CALCIUM GLUCONATE 20 MG/ML
1000 INJECTION, SOLUTION INTRAVENOUS ONCE
Status: COMPLETED | OUTPATIENT
Start: 2022-01-01 | End: 2022-01-01

## 2022-01-01 RX ORDER — MORPHINE SULFATE 2 MG/ML
2 INJECTION, SOLUTION INTRAMUSCULAR; INTRAVENOUS
Status: DISCONTINUED | OUTPATIENT
Start: 2022-01-01 | End: 2022-02-01 | Stop reason: HOSPADM

## 2022-01-01 RX ORDER — FUROSEMIDE 10 MG/ML
60 INJECTION INTRAMUSCULAR; INTRAVENOUS ONCE
Status: COMPLETED | OUTPATIENT
Start: 2022-01-01 | End: 2022-01-01

## 2022-01-01 RX ORDER — SODIUM CHLORIDE 0.9 % (FLUSH) 0.9 %
10 SYRINGE (ML) INJECTION PRN
Status: DISCONTINUED | OUTPATIENT
Start: 2022-01-01 | End: 2022-02-01 | Stop reason: HOSPADM

## 2022-01-01 RX ORDER — FUROSEMIDE 10 MG/ML
20 INJECTION INTRAMUSCULAR; INTRAVENOUS ONCE
Status: COMPLETED | OUTPATIENT
Start: 2022-01-01 | End: 2022-01-01

## 2022-01-01 RX ORDER — AZITHROMYCIN 250 MG/1
250 TABLET, FILM COATED ORAL NIGHTLY
Status: DISCONTINUED | OUTPATIENT
Start: 2022-01-01 | End: 2022-01-01

## 2022-01-01 RX ORDER — SODIUM CHLORIDE 9 MG/ML
INJECTION, SOLUTION INTRAVENOUS CONTINUOUS
Status: DISCONTINUED | OUTPATIENT
Start: 2022-01-01 | End: 2022-02-01 | Stop reason: HOSPADM

## 2022-01-01 RX ORDER — 0.9 % SODIUM CHLORIDE 0.9 %
500 INTRAVENOUS SOLUTION INTRAVENOUS ONCE
Status: COMPLETED | OUTPATIENT
Start: 2022-01-01 | End: 2022-01-01

## 2022-01-01 RX ORDER — MAGNESIUM SULFATE IN WATER 40 MG/ML
2000 INJECTION, SOLUTION INTRAVENOUS PRN
Status: DISCONTINUED | OUTPATIENT
Start: 2022-01-01 | End: 2022-02-01 | Stop reason: HOSPADM

## 2022-01-01 RX ORDER — ACETAMINOPHEN 650 MG/1
650 SUPPOSITORY RECTAL EVERY 6 HOURS PRN
Status: DISCONTINUED | OUTPATIENT
Start: 2022-01-01 | End: 2022-02-01 | Stop reason: HOSPADM

## 2022-01-01 RX ORDER — SODIUM CHLORIDE 0.9 % (FLUSH) 0.9 %
5-40 SYRINGE (ML) INJECTION EVERY 12 HOURS SCHEDULED
Status: DISCONTINUED | OUTPATIENT
Start: 2022-01-01 | End: 2022-02-01 | Stop reason: HOSPADM

## 2022-01-01 RX ORDER — SODIUM CHLORIDE 0.9 % (FLUSH) 0.9 %
5-40 SYRINGE (ML) INJECTION PRN
Status: DISCONTINUED | OUTPATIENT
Start: 2022-01-01 | End: 2022-02-01 | Stop reason: HOSPADM

## 2022-01-01 RX ORDER — SODIUM CHLORIDE 9 MG/ML
INJECTION, SOLUTION INTRAVENOUS PRN
Status: DISCONTINUED | OUTPATIENT
Start: 2022-01-01 | End: 2022-01-01 | Stop reason: SDUPTHER

## 2022-01-01 RX ORDER — LORAZEPAM 2 MG/ML
0.5 INJECTION INTRAMUSCULAR EVERY 4 HOURS PRN
Status: DISCONTINUED | OUTPATIENT
Start: 2022-01-01 | End: 2022-01-01

## 2022-01-01 RX ORDER — DEXTROSE MONOHYDRATE 25 G/50ML
12.5 INJECTION, SOLUTION INTRAVENOUS PRN
Status: DISCONTINUED | OUTPATIENT
Start: 2022-01-01 | End: 2022-02-01 | Stop reason: HOSPADM

## 2022-01-01 RX ORDER — DEXTROSE MONOHYDRATE 50 MG/ML
100 INJECTION, SOLUTION INTRAVENOUS PRN
Status: DISCONTINUED | OUTPATIENT
Start: 2022-01-01 | End: 2022-02-01 | Stop reason: HOSPADM

## 2022-01-01 RX ORDER — DEXAMETHASONE SODIUM PHOSPHATE 10 MG/ML
10 INJECTION, SOLUTION INTRAMUSCULAR; INTRAVENOUS EVERY 24 HOURS
Status: COMPLETED | OUTPATIENT
Start: 2022-01-01 | End: 2022-01-01

## 2022-01-01 RX ORDER — HEPARIN SODIUM 5000 [USP'U]/ML
5000 INJECTION, SOLUTION INTRAVENOUS; SUBCUTANEOUS EVERY 8 HOURS SCHEDULED
Status: DISCONTINUED | OUTPATIENT
Start: 2022-01-01 | End: 2022-02-01 | Stop reason: HOSPADM

## 2022-01-01 RX ORDER — AZITHROMYCIN 250 MG/1
250 TABLET, FILM COATED ORAL NIGHTLY
Status: COMPLETED | OUTPATIENT
Start: 2022-01-01 | End: 2022-01-01

## 2022-01-01 RX ORDER — LIDOCAINE HYDROCHLORIDE 10 MG/ML
5 INJECTION, SOLUTION EPIDURAL; INFILTRATION; INTRACAUDAL; PERINEURAL ONCE
Status: COMPLETED | OUTPATIENT
Start: 2022-01-01 | End: 2022-01-01

## 2022-01-01 RX ORDER — SODIUM CHLORIDE, SODIUM LACTATE, POTASSIUM CHLORIDE, CALCIUM CHLORIDE 600; 310; 30; 20 MG/100ML; MG/100ML; MG/100ML; MG/100ML
INJECTION, SOLUTION INTRAVENOUS CONTINUOUS
Status: ACTIVE | OUTPATIENT
Start: 2022-01-01 | End: 2022-01-01

## 2022-01-01 RX ORDER — DIPHENHYDRAMINE HYDROCHLORIDE 50 MG/ML
12.5 INJECTION INTRAMUSCULAR; INTRAVENOUS EVERY 6 HOURS PRN
Status: DISCONTINUED | OUTPATIENT
Start: 2022-01-01 | End: 2022-02-01 | Stop reason: HOSPADM

## 2022-01-01 RX ORDER — POTASSIUM CHLORIDE 7.45 MG/ML
10 INJECTION INTRAVENOUS PRN
Status: DISCONTINUED | OUTPATIENT
Start: 2022-01-01 | End: 2022-01-01

## 2022-01-01 RX ORDER — DEXAMETHASONE SODIUM PHOSPHATE 4 MG/ML
6 INJECTION, SOLUTION INTRA-ARTICULAR; INTRALESIONAL; INTRAMUSCULAR; INTRAVENOUS; SOFT TISSUE ONCE
Status: COMPLETED | OUTPATIENT
Start: 2022-01-01 | End: 2022-01-01

## 2022-01-01 RX ORDER — NICOTINE POLACRILEX 4 MG
15 LOZENGE BUCCAL PRN
Status: DISCONTINUED | OUTPATIENT
Start: 2022-01-01 | End: 2022-02-01 | Stop reason: HOSPADM

## 2022-01-01 RX ORDER — SODIUM CHLORIDE 0.9 % (FLUSH) 0.9 %
10 SYRINGE (ML) INJECTION EVERY 12 HOURS SCHEDULED
Status: DISCONTINUED | OUTPATIENT
Start: 2022-01-01 | End: 2022-02-01 | Stop reason: HOSPADM

## 2022-01-01 RX ORDER — DEXAMETHASONE SODIUM PHOSPHATE 10 MG/ML
10 INJECTION, SOLUTION INTRAMUSCULAR; INTRAVENOUS EVERY 24 HOURS
Status: DISCONTINUED | OUTPATIENT
Start: 2022-01-01 | End: 2022-01-01

## 2022-01-01 RX ORDER — POTASSIUM CHLORIDE 20 MEQ/1
40 TABLET, EXTENDED RELEASE ORAL ONCE
Status: COMPLETED | OUTPATIENT
Start: 2022-01-01 | End: 2022-01-01

## 2022-01-01 RX ORDER — FUROSEMIDE 10 MG/ML
40 INJECTION INTRAMUSCULAR; INTRAVENOUS DAILY
Status: DISCONTINUED | OUTPATIENT
Start: 2022-01-01 | End: 2022-01-01

## 2022-01-01 RX ORDER — MORPHINE SULFATE 2 MG/ML
2 INJECTION, SOLUTION INTRAMUSCULAR; INTRAVENOUS ONCE
Status: COMPLETED | OUTPATIENT
Start: 2022-01-01 | End: 2022-01-01

## 2022-01-01 RX ORDER — FUROSEMIDE 40 MG/1
40 TABLET ORAL DAILY
Status: DISCONTINUED | OUTPATIENT
Start: 2022-01-01 | End: 2022-01-01

## 2022-01-01 RX ORDER — ACETAMINOPHEN 325 MG/1
650 TABLET ORAL EVERY 6 HOURS PRN
Status: DISCONTINUED | OUTPATIENT
Start: 2022-01-01 | End: 2022-02-01 | Stop reason: HOSPADM

## 2022-01-01 RX ORDER — DEXAMETHASONE SODIUM PHOSPHATE 10 MG/ML
10 INJECTION, SOLUTION INTRAMUSCULAR; INTRAVENOUS EVERY 12 HOURS
Status: DISCONTINUED | OUTPATIENT
Start: 2022-01-01 | End: 2022-01-01

## 2022-01-01 RX ORDER — PROMETHAZINE HYDROCHLORIDE 25 MG/1
12.5 TABLET ORAL EVERY 6 HOURS PRN
Status: DISCONTINUED | OUTPATIENT
Start: 2022-01-01 | End: 2022-02-01 | Stop reason: HOSPADM

## 2022-01-01 RX ORDER — POTASSIUM CHLORIDE 20 MEQ/1
40 TABLET, EXTENDED RELEASE ORAL PRN
Status: DISCONTINUED | OUTPATIENT
Start: 2022-01-01 | End: 2022-01-01

## 2022-01-01 RX ORDER — BUPROPION HYDROCHLORIDE 150 MG/1
150 TABLET, EXTENDED RELEASE ORAL DAILY
Status: DISCONTINUED | OUTPATIENT
Start: 2022-01-01 | End: 2022-02-01 | Stop reason: HOSPADM

## 2022-01-01 RX ORDER — ONDANSETRON 2 MG/ML
4 INJECTION INTRAMUSCULAR; INTRAVENOUS EVERY 6 HOURS PRN
Status: DISCONTINUED | OUTPATIENT
Start: 2022-01-01 | End: 2022-02-01 | Stop reason: HOSPADM

## 2022-01-01 RX ORDER — LORAZEPAM 2 MG/ML
0.5 INJECTION INTRAMUSCULAR
Status: DISCONTINUED | OUTPATIENT
Start: 2022-01-01 | End: 2022-02-01 | Stop reason: HOSPADM

## 2022-01-01 RX ORDER — SODIUM CHLORIDE 9 MG/ML
25 INJECTION, SOLUTION INTRAVENOUS PRN
Status: DISCONTINUED | OUTPATIENT
Start: 2022-01-01 | End: 2022-01-01 | Stop reason: SDUPTHER

## 2022-01-01 RX ADMIN — FUROSEMIDE 40 MG: 40 TABLET ORAL at 08:31

## 2022-01-01 RX ADMIN — Medication 10 ML: at 20:03

## 2022-01-01 RX ADMIN — SODIUM CHLORIDE, PRESERVATIVE FREE 10 ML: 5 INJECTION INTRAVENOUS at 08:24

## 2022-01-01 RX ADMIN — CEFTRIAXONE SODIUM 1000 MG: 1 INJECTION, POWDER, FOR SOLUTION INTRAMUSCULAR; INTRAVENOUS at 23:05

## 2022-01-01 RX ADMIN — BUPROPION HYDROCHLORIDE 150 MG: 150 TABLET, EXTENDED RELEASE ORAL at 09:28

## 2022-01-01 RX ADMIN — CEFTRIAXONE SODIUM 1000 MG: 1 INJECTION, POWDER, FOR SOLUTION INTRAMUSCULAR; INTRAVENOUS at 21:20

## 2022-01-01 RX ADMIN — SODIUM CHLORIDE 25 ML: 9 INJECTION, SOLUTION INTRAVENOUS at 22:01

## 2022-01-01 RX ADMIN — SODIUM CHLORIDE, PRESERVATIVE FREE 10 ML: 5 INJECTION INTRAVENOUS at 08:51

## 2022-01-01 RX ADMIN — Medication 10 ML: at 09:29

## 2022-01-01 RX ADMIN — TOCILIZUMAB 648 MG: 180 INJECTION, SOLUTION SUBCUTANEOUS at 17:04

## 2022-01-01 RX ADMIN — MORPHINE SULFATE 2 MG: 2 INJECTION, SOLUTION INTRAMUSCULAR; INTRAVENOUS at 03:25

## 2022-01-01 RX ADMIN — FUROSEMIDE 40 MG: 40 TABLET ORAL at 09:12

## 2022-01-01 RX ADMIN — SODIUM CHLORIDE, SODIUM LACTATE, POTASSIUM CHLORIDE, AND CALCIUM CHLORIDE: .6; .31; .03; .02 INJECTION, SOLUTION INTRAVENOUS at 15:16

## 2022-01-01 RX ADMIN — SODIUM CHLORIDE 25 ML: 9 INJECTION, SOLUTION INTRAVENOUS at 23:04

## 2022-01-01 RX ADMIN — DEXAMETHASONE SODIUM PHOSPHATE 10 MG: 10 INJECTION, SOLUTION INTRAMUSCULAR; INTRAVENOUS at 08:44

## 2022-01-01 RX ADMIN — FUROSEMIDE 40 MG: 40 TABLET ORAL at 08:35

## 2022-01-01 RX ADMIN — ENOXAPARIN SODIUM 30 MG: 100 INJECTION SUBCUTANEOUS at 23:44

## 2022-01-01 RX ADMIN — SODIUM CHLORIDE, PRESERVATIVE FREE 10 ML: 5 INJECTION INTRAVENOUS at 20:03

## 2022-01-01 RX ADMIN — CEFTRIAXONE SODIUM 1000 MG: 1 INJECTION, POWDER, FOR SOLUTION INTRAMUSCULAR; INTRAVENOUS at 23:51

## 2022-01-01 RX ADMIN — SODIUM CHLORIDE, PRESERVATIVE FREE 10 ML: 5 INJECTION INTRAVENOUS at 21:40

## 2022-01-01 RX ADMIN — DEXAMETHASONE SODIUM PHOSPHATE 10 MG: 10 INJECTION, SOLUTION INTRAMUSCULAR; INTRAVENOUS at 08:29

## 2022-01-01 RX ADMIN — SODIUM CHLORIDE, PRESERVATIVE FREE 10 ML: 5 INJECTION INTRAVENOUS at 09:10

## 2022-01-01 RX ADMIN — HYDROCORTISONE SODIUM SUCCINATE 100 MG: 100 INJECTION, POWDER, FOR SOLUTION INTRAMUSCULAR; INTRAVENOUS at 16:28

## 2022-01-01 RX ADMIN — FUROSEMIDE 40 MG: 40 TABLET ORAL at 08:51

## 2022-01-01 RX ADMIN — ENOXAPARIN SODIUM 30 MG: 100 INJECTION SUBCUTANEOUS at 22:53

## 2022-01-01 RX ADMIN — SODIUM CHLORIDE, PRESERVATIVE FREE 10 ML: 5 INJECTION INTRAVENOUS at 09:28

## 2022-01-01 RX ADMIN — MORPHINE SULFATE 2 MG: 2 INJECTION, SOLUTION INTRAMUSCULAR; INTRAVENOUS at 00:47

## 2022-01-01 RX ADMIN — BUPROPION HYDROCHLORIDE 150 MG: 150 TABLET, EXTENDED RELEASE ORAL at 08:24

## 2022-01-01 RX ADMIN — SODIUM CHLORIDE, PRESERVATIVE FREE 10 ML: 5 INJECTION INTRAVENOUS at 08:37

## 2022-01-01 RX ADMIN — SODIUM CHLORIDE, PRESERVATIVE FREE 10 ML: 5 INJECTION INTRAVENOUS at 20:25

## 2022-01-01 RX ADMIN — CEFTRIAXONE SODIUM 1000 MG: 1 INJECTION, POWDER, FOR SOLUTION INTRAMUSCULAR; INTRAVENOUS at 21:05

## 2022-01-01 RX ADMIN — SODIUM CHLORIDE, PRESERVATIVE FREE 10 ML: 5 INJECTION INTRAVENOUS at 08:35

## 2022-01-01 RX ADMIN — ENOXAPARIN SODIUM 30 MG: 100 INJECTION SUBCUTANEOUS at 21:40

## 2022-01-01 RX ADMIN — MAGNESIUM HYDROXIDE 30 ML: 400 SUSPENSION ORAL at 12:16

## 2022-01-01 RX ADMIN — SODIUM CHLORIDE 25 ML: 9 INJECTION, SOLUTION INTRAVENOUS at 02:07

## 2022-01-01 RX ADMIN — SODIUM CHLORIDE, PRESERVATIVE FREE 10 ML: 5 INJECTION INTRAVENOUS at 08:23

## 2022-01-01 RX ADMIN — SODIUM CHLORIDE, PRESERVATIVE FREE 10 ML: 5 INJECTION INTRAVENOUS at 08:44

## 2022-01-01 RX ADMIN — ENOXAPARIN SODIUM 30 MG: 100 INJECTION SUBCUTANEOUS at 20:55

## 2022-01-01 RX ADMIN — AZITHROMYCIN 250 MG: 250 TABLET, FILM COATED ORAL at 21:04

## 2022-01-01 RX ADMIN — DEXAMETHASONE SODIUM PHOSPHATE 10 MG: 10 INJECTION, SOLUTION INTRAMUSCULAR; INTRAVENOUS at 08:34

## 2022-01-01 RX ADMIN — FUROSEMIDE 40 MG: 40 TABLET ORAL at 08:37

## 2022-01-01 RX ADMIN — DEXAMETHASONE SODIUM PHOSPHATE 10 MG: 10 INJECTION, SOLUTION INTRAMUSCULAR; INTRAVENOUS at 08:22

## 2022-01-01 RX ADMIN — FLUTICASONE PROPIONATE 1 SPRAY: 50 SPRAY, METERED NASAL at 09:55

## 2022-01-01 RX ADMIN — DEXAMETHASONE SODIUM PHOSPHATE 10 MG: 10 INJECTION, SOLUTION INTRAMUSCULAR; INTRAVENOUS at 08:31

## 2022-01-01 RX ADMIN — FUROSEMIDE 40 MG: 40 TABLET ORAL at 09:10

## 2022-01-01 RX ADMIN — SODIUM CHLORIDE, PRESERVATIVE FREE 10 ML: 5 INJECTION INTRAVENOUS at 22:08

## 2022-01-01 RX ADMIN — ONDANSETRON 4 MG: 2 INJECTION INTRAMUSCULAR; INTRAVENOUS at 05:42

## 2022-01-01 RX ADMIN — Medication 30 MCG/MIN: at 03:24

## 2022-01-01 RX ADMIN — FUROSEMIDE 40 MG: 10 INJECTION, SOLUTION INTRAMUSCULAR; INTRAVENOUS at 08:21

## 2022-01-01 RX ADMIN — HYDROCORTISONE SODIUM SUCCINATE 50 MG: 100 INJECTION, POWDER, FOR SOLUTION INTRAMUSCULAR; INTRAVENOUS at 09:57

## 2022-01-01 RX ADMIN — AZITHROMYCIN MONOHYDRATE 250 MG: 500 INJECTION, POWDER, LYOPHILIZED, FOR SOLUTION INTRAVENOUS at 02:08

## 2022-01-01 RX ADMIN — CEFTRIAXONE SODIUM 1000 MG: 1 INJECTION, POWDER, FOR SOLUTION INTRAMUSCULAR; INTRAVENOUS at 21:04

## 2022-01-01 RX ADMIN — BUPROPION HYDROCHLORIDE 150 MG: 150 TABLET, EXTENDED RELEASE ORAL at 15:00

## 2022-01-01 RX ADMIN — ENOXAPARIN SODIUM 30 MG: 100 INJECTION SUBCUTANEOUS at 20:58

## 2022-01-01 RX ADMIN — CEFTRIAXONE SODIUM 1000 MG: 1 INJECTION, POWDER, FOR SOLUTION INTRAMUSCULAR; INTRAVENOUS at 22:01

## 2022-01-01 RX ADMIN — AZITHROMYCIN 250 MG: 250 TABLET, FILM COATED ORAL at 21:47

## 2022-01-01 RX ADMIN — SODIUM CHLORIDE: 9 INJECTION, SOLUTION INTRAVENOUS at 09:39

## 2022-01-01 RX ADMIN — SODIUM CHLORIDE, PRESERVATIVE FREE 10 ML: 5 INJECTION INTRAVENOUS at 09:13

## 2022-01-01 RX ADMIN — HEPARIN SODIUM 5000 UNITS: 5000 INJECTION, SOLUTION INTRAVENOUS; SUBCUTANEOUS at 23:08

## 2022-01-01 RX ADMIN — FUROSEMIDE 60 MG: 10 INJECTION, SOLUTION INTRAVENOUS at 16:28

## 2022-01-01 RX ADMIN — SODIUM CHLORIDE 500 ML: 9 INJECTION, SOLUTION INTRAVENOUS at 11:42

## 2022-01-01 RX ADMIN — SODIUM CHLORIDE, PRESERVATIVE FREE 10 ML: 5 INJECTION INTRAVENOUS at 23:51

## 2022-01-01 RX ADMIN — ENOXAPARIN SODIUM 30 MG: 100 INJECTION SUBCUTANEOUS at 20:20

## 2022-01-01 RX ADMIN — HEPARIN SODIUM 5000 UNITS: 5000 INJECTION, SOLUTION INTRAVENOUS; SUBCUTANEOUS at 06:29

## 2022-01-01 RX ADMIN — FUROSEMIDE 20 MG: 10 INJECTION, SOLUTION INTRAMUSCULAR; INTRAVENOUS at 20:58

## 2022-01-01 RX ADMIN — SODIUM CHLORIDE, POTASSIUM CHLORIDE, SODIUM LACTATE AND CALCIUM CHLORIDE: 600; 310; 30; 20 INJECTION, SOLUTION INTRAVENOUS at 17:11

## 2022-01-01 RX ADMIN — FUROSEMIDE 40 MG: 40 TABLET ORAL at 08:08

## 2022-01-01 RX ADMIN — SODIUM CHLORIDE, PRESERVATIVE FREE 10 ML: 5 INJECTION INTRAVENOUS at 08:36

## 2022-01-01 RX ADMIN — LIDOCAINE HYDROCHLORIDE 5 ML: 10 INJECTION, SOLUTION EPIDURAL; INFILTRATION; INTRACAUDAL; PERINEURAL at 18:45

## 2022-01-01 RX ADMIN — SODIUM CHLORIDE, PRESERVATIVE FREE 10 ML: 5 INJECTION INTRAVENOUS at 20:30

## 2022-01-01 RX ADMIN — SODIUM CHLORIDE, PRESERVATIVE FREE 10 ML: 5 INJECTION INTRAVENOUS at 09:37

## 2022-01-01 RX ADMIN — BUPROPION HYDROCHLORIDE 150 MG: 150 TABLET, EXTENDED RELEASE ORAL at 09:19

## 2022-01-01 RX ADMIN — SODIUM CHLORIDE 25 ML: 9 INJECTION, SOLUTION INTRAVENOUS at 23:50

## 2022-01-01 RX ADMIN — SODIUM CHLORIDE, PRESERVATIVE FREE 10 ML: 5 INJECTION INTRAVENOUS at 20:09

## 2022-01-01 RX ADMIN — FUROSEMIDE 40 MG: 40 TABLET ORAL at 08:44

## 2022-01-01 RX ADMIN — DEXAMETHASONE SODIUM PHOSPHATE 10 MG: 10 INJECTION, SOLUTION INTRAMUSCULAR; INTRAVENOUS at 08:35

## 2022-01-01 RX ADMIN — AZITHROMYCIN MONOHYDRATE 250 MG: 500 INJECTION, POWDER, LYOPHILIZED, FOR SOLUTION INTRAVENOUS at 01:11

## 2022-01-01 RX ADMIN — DEXAMETHASONE SODIUM PHOSPHATE 10 MG: 10 INJECTION, SOLUTION INTRAMUSCULAR; INTRAVENOUS at 08:51

## 2022-01-01 RX ADMIN — DEXAMETHASONE SODIUM PHOSPHATE 10 MG: 10 INJECTION, SOLUTION INTRAMUSCULAR; INTRAVENOUS at 20:58

## 2022-01-01 RX ADMIN — SODIUM CHLORIDE, PRESERVATIVE FREE 10 ML: 5 INJECTION INTRAVENOUS at 21:01

## 2022-01-01 RX ADMIN — DEXAMETHASONE SODIUM PHOSPHATE 6 MG: 4 INJECTION, SOLUTION INTRAMUSCULAR; INTRAVENOUS at 15:52

## 2022-01-01 RX ADMIN — ENOXAPARIN SODIUM 30 MG: 100 INJECTION SUBCUTANEOUS at 20:09

## 2022-01-01 RX ADMIN — DEXAMETHASONE SODIUM PHOSPHATE 10 MG: 10 INJECTION, SOLUTION INTRAMUSCULAR; INTRAVENOUS at 23:44

## 2022-01-01 RX ADMIN — SODIUM CHLORIDE, PRESERVATIVE FREE 10 ML: 5 INJECTION INTRAVENOUS at 20:56

## 2022-01-01 RX ADMIN — SODIUM CHLORIDE, POTASSIUM CHLORIDE, SODIUM LACTATE AND CALCIUM CHLORIDE: 600; 310; 30; 20 INJECTION, SOLUTION INTRAVENOUS at 03:02

## 2022-01-01 RX ADMIN — ENOXAPARIN SODIUM 30 MG: 100 INJECTION SUBCUTANEOUS at 21:09

## 2022-01-01 RX ADMIN — POTASSIUM CHLORIDE 40 MEQ: 1500 TABLET, EXTENDED RELEASE ORAL at 16:21

## 2022-01-01 RX ADMIN — MAGNESIUM HYDROXIDE 30 ML: 400 SUSPENSION ORAL at 01:22

## 2022-01-01 RX ADMIN — SODIUM CHLORIDE, PRESERVATIVE FREE 10 ML: 5 INJECTION INTRAVENOUS at 08:08

## 2022-01-01 RX ADMIN — Medication 55 MCG/MIN: at 10:07

## 2022-01-01 RX ADMIN — CEFEPIME HYDROCHLORIDE 2000 MG: 2 INJECTION, POWDER, FOR SOLUTION INTRAVENOUS at 20:03

## 2022-01-01 RX ADMIN — SODIUM CHLORIDE, PRESERVATIVE FREE 10 ML: 5 INJECTION INTRAVENOUS at 20:55

## 2022-01-01 RX ADMIN — LORAZEPAM 0.5 MG: 2 INJECTION INTRAMUSCULAR; INTRAVENOUS at 04:52

## 2022-01-01 RX ADMIN — FLUTICASONE PROPIONATE 1 SPRAY: 50 SPRAY, METERED NASAL at 09:19

## 2022-01-01 RX ADMIN — Medication 5 MCG/MIN: at 12:40

## 2022-01-01 RX ADMIN — LORAZEPAM 0.5 MG: 2 INJECTION INTRAMUSCULAR; INTRAVENOUS at 12:11

## 2022-01-01 RX ADMIN — AZITHROMYCIN MONOHYDRATE 250 MG: 250 TABLET ORAL at 20:20

## 2022-01-01 RX ADMIN — ENOXAPARIN SODIUM 30 MG: 100 INJECTION SUBCUTANEOUS at 20:53

## 2022-01-01 RX ADMIN — HYDROCORTISONE SODIUM SUCCINATE 50 MG: 100 INJECTION, POWDER, FOR SOLUTION INTRAMUSCULAR; INTRAVENOUS at 02:00

## 2022-01-01 RX ADMIN — SODIUM CHLORIDE, PRESERVATIVE FREE 10 ML: 5 INJECTION INTRAVENOUS at 23:35

## 2022-01-01 RX ADMIN — ENOXAPARIN SODIUM 30 MG: 100 INJECTION SUBCUTANEOUS at 08:34

## 2022-01-01 RX ADMIN — SODIUM CHLORIDE, PRESERVATIVE FREE 10 ML: 5 INJECTION INTRAVENOUS at 22:53

## 2022-01-01 RX ADMIN — CALCIUM GLUCONATE 1000 MG: 20 INJECTION, SOLUTION INTRAVENOUS at 09:28

## 2022-01-01 RX ADMIN — SODIUM CHLORIDE, PRESERVATIVE FREE 10 ML: 5 INJECTION INTRAVENOUS at 08:54

## 2022-01-01 RX ADMIN — ENOXAPARIN SODIUM 30 MG: 100 INJECTION SUBCUTANEOUS at 20:19

## 2022-01-01 RX ADMIN — SODIUM CHLORIDE 25 ML: 9 INJECTION, SOLUTION INTRAVENOUS at 01:10

## 2022-01-01 RX ADMIN — SODIUM CHLORIDE, PRESERVATIVE FREE 10 ML: 5 INJECTION INTRAVENOUS at 21:27

## 2022-01-01 RX ADMIN — SODIUM CHLORIDE, PRESERVATIVE FREE 10 ML: 5 INJECTION INTRAVENOUS at 09:49

## 2022-01-01 RX ADMIN — ENOXAPARIN SODIUM 30 MG: 100 INJECTION SUBCUTANEOUS at 20:36

## 2022-01-01 RX ADMIN — ENOXAPARIN SODIUM 30 MG: 100 INJECTION SUBCUTANEOUS at 21:04

## 2022-01-01 RX ADMIN — SODIUM CHLORIDE, PRESERVATIVE FREE 10 ML: 5 INJECTION INTRAVENOUS at 21:22

## 2022-01-01 RX ADMIN — SODIUM CHLORIDE, PRESERVATIVE FREE 10 ML: 5 INJECTION INTRAVENOUS at 08:30

## 2022-01-01 RX ADMIN — SODIUM CHLORIDE 25 ML: 9 INJECTION, SOLUTION INTRAVENOUS at 02:15

## 2022-01-01 RX ADMIN — SODIUM CHLORIDE: 9 INJECTION, SOLUTION INTRAVENOUS at 19:59

## 2022-01-01 RX ADMIN — SODIUM CHLORIDE, POTASSIUM CHLORIDE, SODIUM LACTATE AND CALCIUM CHLORIDE: 600; 310; 30; 20 INJECTION, SOLUTION INTRAVENOUS at 04:17

## 2022-01-01 RX ADMIN — DEXAMETHASONE SODIUM PHOSPHATE 10 MG: 10 INJECTION, SOLUTION INTRAMUSCULAR; INTRAVENOUS at 08:52

## 2022-01-01 RX ADMIN — CEFTRIAXONE 1000 MG: 1 INJECTION, POWDER, FOR SOLUTION INTRAMUSCULAR; INTRAVENOUS at 20:53

## 2022-01-01 RX ADMIN — INSULIN LISPRO 4 UNITS: 100 INJECTION, SOLUTION INTRAVENOUS; SUBCUTANEOUS at 10:07

## 2022-01-01 RX ADMIN — HYDROCORTISONE SODIUM SUCCINATE 50 MG: 100 INJECTION, POWDER, FOR SOLUTION INTRAMUSCULAR; INTRAVENOUS at 20:00

## 2022-01-01 RX ADMIN — SODIUM CHLORIDE, PRESERVATIVE FREE 10 ML: 5 INJECTION INTRAVENOUS at 21:09

## 2022-01-01 RX ADMIN — FUROSEMIDE 40 MG: 10 INJECTION, SOLUTION INTRAMUSCULAR; INTRAVENOUS at 08:52

## 2022-01-01 RX ADMIN — AZITHROMYCIN MONOHYDRATE 250 MG: 500 INJECTION, POWDER, LYOPHILIZED, FOR SOLUTION INTRAVENOUS at 02:16

## 2022-01-01 RX ADMIN — SODIUM CHLORIDE 25 ML: 9 INJECTION, SOLUTION INTRAVENOUS at 21:03

## 2022-01-01 RX ADMIN — MORPHINE SULFATE 2 MG: 2 INJECTION, SOLUTION INTRAMUSCULAR; INTRAVENOUS at 22:34

## 2022-01-01 RX ADMIN — SODIUM CHLORIDE, PRESERVATIVE FREE 10 ML: 5 INJECTION INTRAVENOUS at 21:04

## 2022-01-01 RX ADMIN — SODIUM CHLORIDE 25 ML: 9 INJECTION, SOLUTION INTRAVENOUS at 21:04

## 2022-01-01 ASSESSMENT — PAIN SCALES - GENERAL
PAINLEVEL_OUTOF10: 0
PAINLEVEL_OUTOF10: 5
PAINLEVEL_OUTOF10: 0
PAINLEVEL_OUTOF10: 2
PAINLEVEL_OUTOF10: 0
PAINLEVEL_OUTOF10: 5
PAINLEVEL_OUTOF10: 0

## 2022-01-01 ASSESSMENT — PAIN SCALES - WONG BAKER
WONGBAKER_NUMERICALRESPONSE: 0
WONGBAKER_NUMERICALRESPONSE: 10
WONGBAKER_NUMERICALRESPONSE: 0
WONGBAKER_NUMERICALRESPONSE: 10
WONGBAKER_NUMERICALRESPONSE: 0

## 2022-01-01 ASSESSMENT — PAIN DESCRIPTION - PAIN TYPE
TYPE: ACUTE PAIN
TYPE: CHRONIC PAIN

## 2022-01-01 ASSESSMENT — PAIN - FUNCTIONAL ASSESSMENT
PAIN_FUNCTIONAL_ASSESSMENT: ACTIVITIES ARE NOT PREVENTED
PAIN_FUNCTIONAL_ASSESSMENT: ACTIVITIES ARE NOT PREVENTED

## 2022-01-01 ASSESSMENT — ENCOUNTER SYMPTOMS
NAUSEA: 0
DIARRHEA: 0
ABDOMINAL PAIN: 0
EYES NEGATIVE: 1
SHORTNESS OF BREATH: 0
RHINORRHEA: 0
COUGH: 0
VOMITING: 0
SORE THROAT: 0
CHEST TIGHTNESS: 0
WHEEZING: 0
COLOR CHANGE: 0

## 2022-01-01 ASSESSMENT — PAIN DESCRIPTION - LOCATION
LOCATION: ARM
LOCATION: LEG

## 2022-01-01 ASSESSMENT — PAIN DESCRIPTION - FREQUENCY
FREQUENCY: CONTINUOUS
FREQUENCY: CONTINUOUS

## 2022-01-01 ASSESSMENT — PAIN DESCRIPTION - PROGRESSION
CLINICAL_PROGRESSION: NOT CHANGED
CLINICAL_PROGRESSION: NOT CHANGED

## 2022-01-01 ASSESSMENT — PAIN DESCRIPTION - ORIENTATION
ORIENTATION: RIGHT;LEFT
ORIENTATION: RIGHT

## 2022-01-01 ASSESSMENT — PAIN DESCRIPTION - ONSET
ONSET: ON-GOING
ONSET: ON-GOING

## 2022-01-01 ASSESSMENT — PAIN DESCRIPTION - DESCRIPTORS
DESCRIPTORS: ACHING;DISCOMFORT
DESCRIPTORS: ACHING

## 2022-01-14 PROBLEM — R09.02 HYPOXIA: Status: ACTIVE | Noted: 2022-01-01

## 2022-01-14 NOTE — ED NOTES

## 2022-01-14 NOTE — ED PROVIDER NOTES
[address] [facility]      Pt Name: Lawrence Villavicencio  MRN: [id number]  [place] 1945  Date ofevaluation: 1/14/2022  Provider: Mylene Simmonds, MD    CHIEF COMPLAINT       Chief Complaint   Patient presents with    Fatigue         HISTORY OF PRESENT ILLNESS   (Location/Symptom, Timing/Onset,Context/Setting, Quality, Duration, Modifying Factors, Severity)  Note limiting factors. Lawrence Villavicencio is a 64 y.o. male  who  has no past medical history on file. 4year-old male presents with generalized fatigue which has been gradual onset worsening for the last 2 weeks. Patient denies fevers or chills. Patient states he has been so weak that he has been unable to get up to go to the bathroom and has been urinating on himself. No other associated symptoms. States that he does have a family member that recently been sick which is his primary risk factor as well as being unvaccinated for COVID. Denies shortness of breath, fever, chills, nausea, vomiting, diarrhea, dysuria, hematuria, neck pain, back pain, chest pain. Nothing else seems to make his symptoms better or worse. Mild at the beginning gradually worsening, constant now moderate to severe. No other modifying factors. Fatigue is generalized. No specific weakness. The history is provided by the patient. No  was used. NursingNotes were reviewed. REVIEW OF SYSTEMS    (2-9 systems for level 4, 10 or more for level 5)     Review of Systems   Constitutional: Positive for fatigue. Negative for fever. HENT: Negative for congestion, rhinorrhea and sore throat. Eyes: Negative. Respiratory: Negative for cough, chest tightness, shortness of breath and wheezing. Cardiovascular: Negative for chest pain. Gastrointestinal: Negative for abdominal pain, diarrhea, nausea and vomiting. Endocrine: Negative. Genitourinary: Negative. Musculoskeletal: Negative. Skin: Negative for color change and rash. Allergic/Immunologic: Negative for environmental allergies and immunocompromised state. Neurological: Negative for dizziness, light-headedness and headaches. Hematological: Negative. All other systems reviewed and are negative. Except as noted above the remainder of the review of systems was reviewed and negative. PAST MEDICAL HISTORY   History reviewed. No pertinent past medical history. SURGICALHISTORY     History reviewed. No pertinent surgical history. CURRENT MEDICATIONS       Previous Medications    DIPHENHYDRAMINE (BENADRYL) 25 MG TABLET    Take 25 mg by mouth every 6 hours as needed for Allergies. Patient has no known allergies. FAMILY HISTORY     History reviewed. No pertinent family history. SOCIAL HISTORY       Social History     Socioeconomic History    Marital status: Single     Spouse name: None    Number of children: None    Years of education: None    Highest education level: None   Occupational History    None   Tobacco Use    Smoking status: Never Smoker    Smokeless tobacco: Never Used   Substance and Sexual Activity    Alcohol use: No    Drug use: No    Sexual activity: None   Other Topics Concern    None   Social History Narrative    None     Social Determinants of Health     Financial Resource Strain:     Difficulty of Paying Living Expenses: Not on file   Food Insecurity:     Worried About Running Out of Food in the Last Year: Not on file    [place] of Food in the Last Year: Not on file   Transportation Needs:     Lack of Transportation (Medical): Not on file    Lack of Transportation (Non-Medical):  Not on file   Physical Activity:     Days of Exercise per Week: Not on file    Minutes of Exercise per Session: Not on file   Stress:     Feeling of Stress : Not on file   Social Connections:     Frequency of Communication with Friends and Family: Not on file    Frequency of Social Gatherings with Friends and Family: Not on file    Attends Christianity Services: Not on file    Active Member of Clubs or Organizations: Not on file    Attends Club or Organization Meetings: Not on file    Marital Status: Not on file   Intimate Partner Violence:     Fear of Current or Ex-Partner: Not on file    Emotionally Abused: Not on file    Physically Abused: Not on file    Sexually Abused: Not on file   Housing Stability:     Unable to Pay for Housing in the Last Year: Not on file    Number of [place] in the Last Year: Not on file    Unstable Housing in the Last Year: Not on file       SCREENINGS             PHYSICAL EXAM    (up to 7 for level 4, 8 or more for level 5)     ED Triage Vitals   BP Temp Temp src Pulse Resp SpO2 Height Weight   -- -- -- -- -- -- -- --       Physical Exam  Vitals and nursing note reviewed. Constitutional:       General: He is not in acute distress. Appearance: Normal appearance. He is well-developed and normal weight. He is not ill-appearing, toxic-appearing or diaphoretic. HENT:      Head: Normocephalic and atraumatic. Right Ear: Tympanic membrane and external ear normal.      Left Ear: Tympanic membrane and external ear normal.      Nose: No congestion or rhinorrhea. Mouth/Throat:      Mouth: Mucous membranes are moist.      Pharynx: Oropharynx is clear. No oropharyngeal exudate or posterior oropharyngeal erythema. Eyes:      Extraocular Movements: Extraocular movements intact. Cardiovascular:      Rate and Rhythm: Normal rate and regular rhythm. Pulses: Normal pulses. Heart sounds: Normal heart sounds. Pulmonary:      Effort: Pulmonary effort is normal. Tachypnea present. No respiratory distress. Breath sounds: No stridor. Rhonchi present. No decreased breath sounds, wheezing or rales. Chest:      Chest wall: No tenderness. Abdominal:      General: Bowel sounds are normal.      Palpations: Abdomen is soft.       Tenderness: There is no abdominal tenderness. Musculoskeletal:         General: Normal range of motion. Cervical back: Normal range of motion and neck supple. No rigidity. Right lower leg: No edema. Left lower leg: No edema. Lymphadenopathy:      Cervical: No cervical adenopathy. Skin:     General: Skin is warm and dry. Capillary Refill: Capillary refill takes less than 2 seconds. Findings: No rash. Neurological:      General: No focal deficit present. Mental Status: He is alert and oriented to person, place, and time. Psychiatric:         Mood and Affect: Mood normal.         Behavior: Behavior normal.         RESULTS     EKG: All EKG's are interpreted by the Emergency Department Physician who either signs or Co-signsthis chart in the absence of a cardiologist.     I was available for consultation during patient's ED stay. Patient was cared for by CHRISTIANE. I did not evaluate or participate in patient care. EKG Interpretation    Interpreted by emergency department physician    Rhythm: normal sinus   Rate: normal  Axis: normal  Ectopy: premature ventricular contractions (frequent)  Conduction: normal  ST Segments: nonspecific changes  T Waves: normal  Q Waves: none    Clinical Impression: Normal sinus rhythm with frequent premature ventricular complexes with nonspecific ST abnormalities. Wilbur Nair MD      RADIOLOGY:   Non-plain filmimages such as CT, Ultrasound and MRI are read by the radiologist.     Interpretation per the Radiologist below, if available at the time ofthis note:    XR CHEST PORTABLE   Final Result   Ground-glass and interstitial opacities, indeterminate for pulmonary edema or   atypical pneumonia, including COVID. Minimal consolidation in the left base, asymmetrical airspace disease or   atelectasis.                ED BEDSIDE ULTRASOUND:   Performed by ED Physician - none    LABS:  Labs Reviewed   COVID-19, RAPID - Abnormal; Notable for the following components:       Result Value    SARS-CoV-2, NAAT DETECTED (*)     All other components within normal limits    Narrative:     Performed at:  [address] [place]   Phone [phone]   CBC WITH AUTO DIFFERENTIAL - Abnormal; Notable for the following components:    WBC 13.6 (*)     RBC 1.96 (*)     Hemoglobin 7.8 (*)     Hematocrit 23.2 (*)     .4 (*)     MCH 39.9 (*)     Neutrophils Absolute 12.2 (*)     Lymphocytes Absolute 0.4 (*)     All other components within normal limits    Narrative:     Performed at:  [address] [facility] [place]   Phone [phone]   COMPREHENSIVE METABOLIC PANEL W/ REFLEX TO MG FOR LOW K - Abnormal; Notable for the following components:    CO2 15 (*)     Anion Gap 23 (*)     Glucose 154 (*)     BUN 49 (*)     Albumin 2.7 (*)     Albumin/Globulin Ratio 0.6 (*)     Total Bilirubin 1.1 (*)     Alkaline Phosphatase 142 (*)     All other components within normal limits    Narrative:     Performed at:  [address] [place]   Phone [phone]   TROPONIN - Abnormal; Notable for the following components:    Troponin 0.02 (*)     All other components within normal limits    Narrative:     Performed at:  [address] [facility] [place]   Phone [phone]   BRAIN NATRIURETIC PEPTIDE - Abnormal; Notable for the following components:    Pro-BNP 1,225 (*)     All other components within normal limits    Narrative:     Performed at:  [address] [facility] [place]   Phone [phone]   LIPASE    Narrative:     Performed at:  [address] [facility] [place]   Phone [phone]   URINE RT REFLEX TO CULTURE       All other labs were within normal range or not returned as of this dictation. EMERGENCY DEPARTMENT COURSE and DIFFERENTIAL DIAGNOSIS/MDM:   Vitals:    Vitals:    01/14/22 1438 01/14/22 1502 01/14/22 1532 01/14/22 1547   BP:  125/68 121/66 136/65   Pulse:  85 87 79   Resp: 26 (!) 32 29 (!) 33   Temp:       TempSrc:       SpO2:  100% 100% 100%   Weight:           Patient was given thefollowing medications:  Medications   dexamethasone (DECADRON) injection 6 mg (6 mg IntraVENous Given 1/14/22 1552)       ED COURSE & MEDICAL DECISION MAKING    Pertinent Labs & Imaging studies reviewed. (See chart for details)   -  Patient seen and evaluated in the emergency department. -  Triage and nursing notes reviewed and incorporated. -  Old chart records reviewed and incorporated. -  Differential diagnosis includes: Differential diagnoses: COVID 19, Influenza, Other viral illness, Meningitis, Group A strep, Airway Obstruction, Pneumonia, Hypoxemia, Dehydration, other. 57-year-old male who is hypoxic tachypneic with bilateral rhonchi concerning for COVID. Patient is unvaccinated does have a sick contact. Patient was 58% on room air per EMS is approximately 88% on nonrebreather. Patient to be placed on high flow nasal cannula at this time. Respiratory rate elevated. Rest of patient's exam is relatively unremarkable. He is afebrile not tachycardic no chest pain. EKG without signs of ischemia. I have high degree of suspicion the patient will be positive for COVID and will require admission.    -  Work-up included:  See above  -  ED treatment included: See above  -  Results discussed with patient. REASSESSMENT     ED Course as of 01/14/22 1604   Fri Jan 14, 2022   1402 Patient now on high flow nasal cannula saturating well. Decreased work of breathing. [SC]   1435 Patient with worsening respiratory status. Patient now placed on BiPAP. [SC]   1601 Respiratory status remains unchanged.   Patient has elevated white blood cell count. Given patient's atypical findings on CT scan we will give him antibiotics as well as steroids. Although I believe the patient likely has COVID and not bacterial pneumonia. [SC]      ED Course User Index  [SC] Cornelius Florentino MD         CRITICAL CARE TIME   Total Critical Care time was 33 minutes, excluding separately reportable procedures. There was a high probability of clinically significant/life threatening deterioration in the patient's condition which required my urgent intervention. This includes multiple reevaluations, vital sign monitoring, pulse oximetry monitoring, telemetry monitoring, clinical response to the IV medications, reviewing the nursing notes, consultation time, dictation/documentation time, and interpretation of the labwork. (This time excludes time spent performing procedures). CONSULTS:  None    PROCEDURES:  Unless otherwise noted below, none     Procedures    FINAL IMPRESSION      1. COVID-19    2. Generalized weakness    3. Acute respiratory failure with hypoxia (HCC)    4. Atypical pneumonia          DISPOSITION/PLAN   DISPOSITION Decision To Admit 01/14/2022 04:04:04 PM      PATIENT REFERREDTO:  No follow-up provider specified.     DISCHARGEMEDICATIONS:  New Prescriptions    No medications on file          (Please note that portions of this note were completed with a voice recognition program.  Efforts were made to edit the dictations but occasionally words are mis-transcribed.)    Cornelius Florentino MD (electronically signed)  Attending Emergency Physician         Cornelius Florentino MD  01/14/22 9632

## 2022-01-14 NOTE — ED TRIAGE NOTES
Patient arrives to the ED via EMS with complaints of shortness of breath, weakness, loss of appetite, and lethargy. States he has been feeling unwell for the past week and has had no strength to eat or stand. Patient arrives covered in urine as he was too weak to stand to go to the restroom. Patient denies seeing a primary care provider in years. Initially 56% oxygen saturation on room. Patient placed on high flow nasal cannula and non-rebreather mask.

## 2022-01-15 NOTE — PROGRESS NOTES
Patient admitted to room 5273 on BiPap. Non skid socks provided. Bed is in lowest locked position and call light and bedside table are within reach. Discussed patient rights and responsibilities. Oriented to room. Updated 2 members of family. Salvador Jacobson and Sylvester.

## 2022-01-15 NOTE — H&P
Hospital Medicine History & Physical      PCP: No primary care provider on file. Date of Admission: 1/14/2022    Chief Complaint:  SOB    History Of Present Illness:  Patient is a 64-year-old male without significant past medical history who presented to hospital for fevers chills for about 2 weeks. Patient has been feeling tired fatigue and having generalized body aches for 2 weeks, he also mentions he has cough, nonproductive. Denied chest pain nausea vomiting diarrhea constipation. Past Medical History:      History reviewed. No pertinent past medical history. Past Surgical History:      History reviewed. No pertinent surgical history. Medications Prior to Admission:      Prior to Admission medications    Not on File       Allergies:  Patient has no known allergies. Social History:      TOBACCO:   reports that he has never smoked. He has never used smokeless tobacco.  ETOH:   reports no history of alcohol use. Family History:       Reviewed in detail and non contributory      History reviewed. No pertinent family history. REVIEW OF SYSTEMS:   Pertinent positives as noted in the HPI. All other systems reviewed and negative. PHYSICAL EXAM PERFORMED:    BP (!) 149/79   Pulse 91   Temp 97.7 °F (36.5 °C) (Axillary)   Resp 29   Wt 162 lb 11.2 oz (73.8 kg)   SpO2 100%     General appearance:  No apparent distress, cooperative. HEENT:  Normal cephalic, atraumatic without obvious deformity. Conjunctivae/corneas clear. Neck: Supple, with full range of motion. No cervical lymphadenopathy  Respiratory: Decreased breathing sounds bilaterally  Cardiovascular:  Regular rate and rhythm with normal S1/S2 without murmurs, rubs or gallops. Abdomen: Soft, non-tender, non-distended, normal bowel sounds. Musculoskeletal:  No edema noted bilaterally. No tenderness on palpation   Skin: no rash visible  Neurologic:  Neurologically intact without any focal sensory/motor deficits.   grossly non-focal.  Psychiatric:  Alert and oriented, normal mood  Peripheral Pulses: +2 palpable, equal bilaterally       Labs:     Recent Labs     01/14/22  1422   WBC 13.6*   HGB 7.8*   HCT 23.2*        Recent Labs     01/14/22  1422      K 3.6      CO2 15*   BUN 49*   CREATININE 0.9   CALCIUM 8.8     Recent Labs     01/14/22  1422   AST 35   ALT 15   BILITOT 1.1*   ALKPHOS 142*     No results for input(s): INR in the last 72 hours. Recent Labs     01/14/22  1422   TROPONINI 0.02*       Urinalysis:    No results found for: Darrelyn Meals, BACTERIA, RBCUA, BLOODU, SPECGRAV, GLUCOSEU    Radiology:       XR CHEST PORTABLE   Final Result   Ground-glass and interstitial opacities, indeterminate for pulmonary edema or   atypical pneumonia, including COVID. Minimal consolidation in the left base, asymmetrical airspace disease or   atelectasis. Active Hospital Problems    Diagnosis Date Noted    Hypoxia [R09.02] 01/14/2022       Patient is a 43-year-old male without significant past medical history who presented to hospital for fevers chills for about 2 weeks. Patient has been feeling tired fatigue and having generalized body aches for 2 weeks, he also mentions he has cough, nonproductive. Denied chest pain nausea vomiting diarrhea constipation.     Assessment  Acute hypoxic respiratory failure satting less than 90% on room air  Sepsis present on admission likely secondary to COVID-19 pneumonia, cannot rule out bacterial pneumonia-unspecified organism  Elevated troponin likely demand ischemia  Leukocytosis likely secondary to #2    Plan  We will start IV Decadron 20 mg daily followed by 10 mg, consult pulmonary, patient is currently on BiPAP, continue oxygen supplementation, wean as tolerated, check CRP, procalcitonin, D-dimer level  Monitor on cardiac telemetry, monitor troponin  Start empirical antibiotics with Rocephin, azithromycin, de-escalate if procalcitonin is normal  DVT prophylaxis-Lovenox twice daily dosing  Diet: ADULT DIET; Regular  Code Status: Full Code    PT/OT Eval Status: ordered    Dispo - pending clinical improvement       Jose Alfredo Dooley MD    The note was completed using EMR and Dragon dictation system. Every effort was made to ensure accuracy; however, inadvertent computerized transcription errors may be present. Thank you No primary care provider on file. for the opportunity to be involved in this patient's care. If you have any questions or concerns please feel free to contact me at [phone].     Jose Alfredo Dooley MD

## 2022-01-15 NOTE — PROGRESS NOTES
Physical Therapy  Attempt Note    Name:Joss Valente  :1945  FGN:[id number]  Room: P5C-1602/5273-01    Date of Service: 1/15/2022     PT orders received/acknowledged, Patient chart reviewed. PT attempted to see for PT eval/tx at this time. hold PT evaluation at this time due to patient on continuous BIPAP. Will attempt again as therapy schedule permits and patient medically able.                   Electronically signed by Kvng Theodore PT on 1/15/2022 at 8:02 AM

## 2022-01-15 NOTE — PROGRESS NOTES
Respiratory therapy    Pt pulled off BIPAP mask; stating he wants to eat  Replaced HFNC at 15L, SPO2 96%  RN informed    Electronically signed by Monserrat Mcallister RCP on 1/15/2022 at 9:46 AM

## 2022-01-15 NOTE — PROGRESS NOTES
4 Eyes Skin Assessment     NAME:  Vernell Davalos  YOB: 1945  MEDICAL RECORD NUMBER:  [id number]    The patient is being assess for  Admission    I agree that 2 RN's have performed a thorough Head to Toe Skin Assessment on the patient. ALL assessment sites listed below have been assessed. Areas assessed by both nurses:    Head, Face, Ears, Shoulders, Back, Chest, Arms, Elbows, Hands, Sacrum. Buttock, Coccyx, Ischium and Legs. Feet and Heels        Does the Patient have a Wound?  No noted wound(s)       Silvestre Prevention initiated:  Yes   Wound Care Orders initiated:  No    Pressure Injury (Stage 3,4, Unstageable, DTI, NWPT, and Complex wounds) if present place consult order under [de-identified] No    New and Established Ostomies if present place consult order under : No      Nurse 1 eSignature: Electronically signed by Michael Shaffer RN on 1/15/22 at 3:59 AM EST    **SHARE this note so that the co-signing nurse is able to place an eSignature**    Nurse 2 eSignature: Electronically signed by Manav Mario RN on 1/15/22 at 4:02 AM EST

## 2022-01-15 NOTE — PROGRESS NOTES
Hematocrit of 20.5. Woodrow Acosta MD notified. Will continue to monitor.     Electronically signed by Hemalatha Tsai RN on 1/15/2022 at 9:24 AM

## 2022-01-16 NOTE — PROGRESS NOTES
Hospitalist Medicine   Progress Note    Date of Service: 01/16/22  MRN: [id number]  LOS: 2    CC:  Fatigue    Summary:   Patient is a 17-year-old male without significant past medical history who presented to hospital for fevers chills for about 2 weeks.  Patient has been feeling tired fatigue and having generalized body aches for 2 weeks, he also mentions he has cough, nonproductive.  Denied chest pain nausea vomiting diarrhea constipation. INTERVAL HISTORY:     No acute events overnight. Feels ok, no complaints. MEDICATIONS:   Scheduled Meds:   furosemide  40 mg IntraVENous Daily    sodium chloride flush  10 mL IntraVENous 2 times per day    enoxaparin  30 mg SubCUTAneous BID    azithromycin  250 mg IntraVENous Q24H    cefTRIAXone (ROCEPHIN) IV  1,000 mg IntraVENous Q24H    [START ON 1/20/2022] dexamethasone  10 mg IntraVENous Q24H     Continuous Infusions:   sodium chloride 25 mL (01/16/22 0215)         PHYSICAL EXAM:   /62   Pulse 88   Temp 97.5 °F (36.4 °C) (Oral)   Resp 18   Ht 6' 2\" (1.88 m)   Wt 155 lb 3.3 oz (70.4 kg)   SpO2 97%   BMI 19.93 kg/m²     Physical Exam  Vitals reviewed. Constitutional:       General: He is not in acute distress. Appearance: Normal appearance. He is well-developed. He is not diaphoretic. HENT:      Head: Normocephalic and atraumatic. Mouth/Throat:      Mouth: Mucous membranes are moist.   Eyes:      Extraocular Movements: Extraocular movements intact. Pupils: Pupils are equal, round, and reactive to light. Cardiovascular:      Rate and Rhythm: Normal rate and regular rhythm. Pulses: Normal pulses. Heart sounds: Normal heart sounds. No murmur heard. No friction rub. No gallop. Pulmonary:      Effort: No respiratory distress. Breath sounds: No wheezing or rales. Comments: Clear but decreased breath sounds  Chest:      Chest wall: No tenderness.    Abdominal:      General: Bowel sounds are normal. There is no distension. Palpations: Abdomen is soft. There is no mass. Tenderness: There is no abdominal tenderness. There is no guarding. Musculoskeletal:         General: No tenderness or deformity. Cervical back: Normal range of motion and neck supple. Right lower leg: No edema. Left lower leg: No edema. Skin:     General: Skin is warm and dry. Capillary Refill: Capillary refill takes less than 2 seconds. Neurological:      General: No focal deficit present. Mental Status: He is alert and oriented to person, place, and time. Psychiatric:         Behavior: Behavior normal.             LABS / IMAGING:     Recent Labs     01/14/22  1422 01/15/22  0812   WBC 13.6* 10.0   HGB 7.8* 7.1*   HCT 23.2* 20.5*    251     Recent Labs     01/14/22  1422 01/15/22  0812    139   K 3.6 3.5    103   CO2 15* 21   BUN 49* 59*   CREATININE 0.9 0.8   CALCIUM 8.8 8.6     Recent Labs     01/14/22  1422   AST 35   ALT 15   BILITOT 1.1*   ALKPHOS 142*     No results for input(s): INR in the last 72 hours. Recent Labs     01/14/22  1422 01/14/22  2130   TROPONINI 0.02* 0.04*       XR CHEST 1 VIEW   Final Result   Patchy infiltrates within both lungs, most compatible with multifocal   pneumonia, especially COVID-19 pneumonia. All in all, findings are similar   when compared to the previous exam from yesterday. XR CHEST PORTABLE   Final Result   Ground-glass and interstitial opacities, indeterminate for pulmonary edema or   atypical pneumonia, including COVID. Minimal consolidation in the left base, asymmetrical airspace disease or   atelectasis. ASSESSMENT:     Acute hypoxic respiratory failure   Sepsis present on admission likely secondary to   COVID-19 pneumonia  Acute blood loss anemia   - rule out GI bleed  Elevated troponin likely demand ischemia    On 15L high flow via NC. S/p Actemra. No active sign of bleeding, possibly GI. BNP elevated. PLAN:     - type/screen, H&H, repeat BNP in AM  - transfuse 1 unit pRBC  - hold lovenox  - dexamethasone 10mg daily  - lasix daily   - empiric IV ceftriaxone/azithro  - mucinex 600mg BID  - incentive spirometry  - continue high flow O2, keep sats > 92%  - wean O2 as tolerated  - PT/OT        Dispo: Inpatient med/surg.     PPx:     SCDs  PT/OT:    Ordered   Code status:  Full Code         Rafat Heart, MD  Hospitalist

## 2022-01-16 NOTE — PROGRESS NOTES
Overnight an ABG was drawn and the Hemoglobin was <5.0. Abdoulaye Thompson MD paged. Will continue to monitor.     Electronically signed by Chava Lopez RN on 1/16/2022 at 7:54 AM

## 2022-01-16 NOTE — PROGRESS NOTES
Hospitalist Progress Note      PCP: No primary care provider on file. Chief Complaint. Patient is a 68-year-old male without significant past medical history who presented to hospital for fevers chills for about 2 weeks. Patient has been feeling tired fatigue and having generalized body aches for 2 weeks, he also mentions he has cough, nonproductive. Denied chest pain nausea vomiting diarrhea constipation. Date of Admission: 1/14/2022      Subjective:   He is on BiPAP, he mentions his shortness of breath is better than yesterday, denies chest pain, nausea, vomiting, shortness of breath, fever or chills. Medications:  Reviewed    Infusion Medications    sodium chloride 25 mL (01/15/22 0110)     Scheduled Medications    sodium chloride flush  10 mL IntraVENous 2 times per day    enoxaparin  30 mg SubCUTAneous BID    azithromycin  250 mg IntraVENous Q24H    cefTRIAXone (ROCEPHIN) IV  1,000 mg IntraVENous Q24H    dexamethasone  10 mg IntraVENous Q12H    [START ON 1/20/2022] dexamethasone  10 mg IntraVENous Q24H     PRN Meds: sodium chloride flush, sodium chloride, potassium chloride **OR** potassium alternative oral replacement **OR** potassium chloride, potassium chloride, magnesium sulfate, promethazine **OR** ondansetron, magnesium hydroxide, acetaminophen **OR** acetaminophen    No intake or output data in the 24 hours ending 01/15/22 1924    Physical Exam Performed:    /74   Pulse 85   Temp 98 °F (36.7 °C) (Oral)   Resp 18   Wt 160 lb 15 oz (73 kg)   SpO2 96%     General appearance: No apparent distress,   HEENT:  Conjunctivae/corneas clear. Neck: Supple, with full range of motion. Respiratory: Decreased breathing sounds bilaterally  Cardiovascular: Regular rate and rhythm with normal S1/S2 without murmurs or rubs  Abdomen: Soft, non-tender, non-distended, normal bowel sounds.   Musculoskeletal: No cyanosis or edema bilaterally  Neurologic:  without any focal sensory/motor deficits. grossly non-focal.  Psychiatric: Alert and oriented, Normal mood  Peripheral Pulses: +2 palpable, equal bilaterally       Labs:   Recent Labs     01/14/22  1422 01/15/22  0812   WBC 13.6* 10.0   HGB 7.8* 7.1*   HCT 23.2* 20.5*    251     Recent Labs     01/14/22  1422 01/15/22  0812    139   K 3.6 3.5    103   CO2 15* 21   BUN 49* 59*   CREATININE 0.9 0.8   CALCIUM 8.8 8.6     Recent Labs     01/14/22  1422   AST 35   ALT 15   BILITOT 1.1*   ALKPHOS 142*     No results for input(s): INR in the last 72 hours. Recent Labs     01/14/22  1422 01/14/22  2130   TROPONINI 0.02* 0.04*       Urinalysis:    No results found for: Darrelyn Meals, BACTERIA, RBCUA, BLOODU, SPECGRAV, GLUCOSEU    Radiology:  XR CHEST PORTABLE   Final Result   Ground-glass and interstitial opacities, indeterminate for pulmonary edema or   atypical pneumonia, including COVID. Minimal consolidation in the left base, asymmetrical airspace disease or   atelectasis. XR CHEST 1 VIEW    (Results Pending)         Assessment/Plan:    Active Hospital Problems    Diagnosis     Hypoxia [R09.02]      Patient is a 58-year-old male without significant past medical history who presented to hospital for fevers chills for about 2 weeks. Patient has been feeling tired fatigue and having generalized body aches for 2 weeks, he also mentions he has cough, nonproductive.   Denied chest pain nausea vomiting diarrhea constipation.     Assessment  Acute hypoxic respiratory failure satting less than 90% on room air  Sepsis present on admission likely secondary to COVID-19 pneumonia, cannot rule out bacterial pneumonia-unspecified organism  Elevated troponin likely demand ischemia  Leukocytosis likely secondary to #2     Plan  We will start IV Decadron 20 mg daily followed by 10 mg, consult pulmonary, patient is currently on BiPAP, continue oxygen supplementation, wean as tolerated, check CRP, procalcitonin, D-dimer level  Monitor on cardiac telemetry, monitor troponin  Start empirical antibiotics with Rocephin, azithromycin, de-escalate if procalcitonin is normal  DVT prophylaxis-Lovenox twice daily dosing  Diet: ADULT DIET;  Regular  Code Status: Full Code    PT/OT Eval Status: ordered    Dispo -continue empirical antibiotics, start Actemra, CRP significantly elevated, consult pulmonary, continue BiPAP support, wean as tolerated    Abdoulaye Thompson MD

## 2022-01-16 NOTE — PLAN OF CARE
Problem: Falls - Risk of:  Goal: Will remain free from falls  Description: Will remain free from falls  1/16/2022 0053 by Merlin Janus, RN  Outcome: Ongoing  1/15/2022 1939 by Chava Lopez RN  Outcome: Ongoing  Goal: Absence of physical injury  Description: Absence of physical injury  1/16/2022 0053 by Merlin Janus, RN  Outcome: Ongoing  1/15/2022 1939 by Chava Lopez RN  Outcome: Ongoing     Problem: Airway Clearance - Ineffective  Goal: Achieve or maintain patent airway  1/16/2022 0053 by Merlin Janus, RN  Outcome: Ongoing  1/15/2022 1939 by Chava Lopez RN  Outcome: Ongoing     Problem: Gas Exchange - Impaired  Goal: Absence of hypoxia  1/16/2022 0053 by Merlin Janus, RN  Outcome: Ongoing  1/15/2022 1939 by Chava Lopez RN  Outcome: Ongoing  Goal: Promote optimal lung function  1/16/2022 0053 by Merlin Janus, RN  Outcome: Ongoing  1/15/2022 1939 by Chava Lopez RN  Outcome: Ongoing     Problem: Breathing Pattern - Ineffective  Goal: Ability to achieve and maintain a regular respiratory rate  1/16/2022 0053 by Merlin Janus, RN  Outcome: Ongoing  1/15/2022 1939 by Chava Lopez RN  Outcome: Ongoing     Problem:  Body Temperature -  Risk of, Imbalanced  Goal: Ability to maintain a body temperature within defined limits  1/16/2022 0053 by Merlin Janus, RN  Outcome: Ongoing  1/15/2022 1939 by Chava Lopez RN  Outcome: Ongoing  Goal: Will regain or maintain usual level of consciousness  1/16/2022 0053 by Merlin Janus, RN  Outcome: Ongoing  1/15/2022 1939 by Chava Lopez RN  Outcome: Ongoing  Goal: Complications related to the disease process, condition or treatment will be avoided or minimized  1/16/2022 0053 by Merlin Janus, RN  Outcome: Ongoing  1/15/2022 1939 by Chava Lopez RN  Outcome: Ongoing     Problem: Isolation Precautions - Risk of Spread of Infection  Goal: Prevent transmission of infection  1/16/2022 0053 by Merlin Janus, RN  Outcome: Ongoing  1/15/2022 1939 by Lane Blackmon RN  Outcome: Ongoing     Problem: Nutrition Deficits  Goal: Optimize nutritional status  1/16/2022 0053 by Keyur Gandara RN  Outcome: Ongoing  1/15/2022 1939 by aLne Blackmon RN  Outcome: Ongoing     Problem: Risk for Fluid Volume Deficit  Goal: Maintain normal heart rhythm  1/16/2022 0053 by Keyur Gandara RN  Outcome: Ongoing  1/15/2022 1939 by Lane Blackmon RN  Outcome: Ongoing  Goal: Maintain absence of muscle cramping  1/16/2022 0053 by Keyur Gandara RN  Outcome: Ongoing  1/15/2022 1939 by Lane Blackmon RN  Outcome: Ongoing  Goal: Maintain normal serum potassium, sodium, calcium, phosphorus, and pH  1/16/2022 0053 by Keyur Gandara RN  Outcome: Ongoing  1/15/2022 1939 by Lane Blackmon RN  Outcome: Ongoing     Problem: Loneliness or Risk for Loneliness  Goal: Demonstrate positive use of time alone when socialization is not possible  1/16/2022 0053 by Keyur Gandara RN  Outcome: Ongoing  1/15/2022 1939 by Lane Blackmon RN  Outcome: Ongoing     Problem: Fatigue  Goal: Verbalize increase energy and improved vitality  1/16/2022 0053 by Keyur Gandara RN  Outcome: Ongoing  1/15/2022 1939 by Lane Blackmon RN  Outcome: Ongoing     Problem: Patient Education: Go to Patient Education Activity  Goal: Patient/Family Education  1/16/2022 0053 by Keyur Gandara RN  Outcome: Ongoing  1/15/2022 1939 by Lane Blackmon RN  Outcome: Ongoing     Problem: Skin Integrity:  Goal: Will show no infection signs and symptoms  Description: Will show no infection signs and symptoms  1/16/2022 0053 by Keyur Gandara RN  Outcome: Ongoing  1/15/2022 1939 by Lane Blackmon RN  Outcome: Ongoing  Goal: Absence of new skin breakdown  Description: Absence of new skin breakdown  1/16/2022 0053 by Keyur Gandara RN  Outcome: Ongoing  1/15/2022 1939 by Lane Blackmon RN  Outcome: Ongoing

## 2022-01-16 NOTE — PROGRESS NOTES
Patient with O2 sat in the 60s. RN went into room to find HFNC out of patient's nares. RN woke patient to ask him to put on the BiPap mask. Patient disoriented to place and situation. RN asked patient \"Do you know where you are?\"  Patient stated \"I really don't. \"  RN explained situation to patient and asked if he'd be agreeable to wearing the BiPap mask. Patient agreed.

## 2022-01-16 NOTE — PROGRESS NOTES
Pt only wants nephew (Hien Wang) to be given update. Number is chart. Will continue to monitor.     Electronically signed by Ramona Angela RN on 1/16/2022 at 12:36 PM

## 2022-01-16 NOTE — PROGRESS NOTES
[facility] notified RN of SpO2 went into 72%. Pt bumped from 8L to 15L to achieve SpO2 greater than 90%. Randal Henderson MD notifed of   O2 demands increase. Will continue to monitor.     Electronically signed by Yudelka Hsieh RN on 1/15/2022 at 7:37 PM

## 2022-01-16 NOTE — PROGRESS NOTES
Physical Therapy    Facility/Department: [address] PROGRESSIVE CARE  Initial Assessment    NAME: Galina Winn  : 1945  MRN: [id number]    Date of Service: 2022    Discharge Recommendations:  Continue to assess pending progress   PT Equipment Recommendations  Other: Will monitor for potential equipt needs. Assessment   Body structures, Functions, Activity limitations: Decreased functional mobility ; Decreased strength;Decreased endurance;Decreased balance  Assessment: 65 y/o male admit 2022 with Acute Respiratory, Pneumonia, COVID+. Reports generalized weakness; worsening over past 2 wks (inability to get up to go to bathroom). PMH insign otherwise. PTA pt living with family (both work) in apt setting with steps to enter/access 1st floor apt; independent daily care and functional mobility prior. Pt currently requiring O2 10L; does desat briefly low 80's following functional activities although quickly recovers. Pt dianna oob via [employer] assist.  Will monitor pt's progress; possible need cont PT (3-5) upon d/c. Prognosis: Good  Decision Making: Medium Complexity  History: 65 y/o male admit 2022 with Acute Respiratory, Pneumonia, COVID+. Reports generalized weakness; worsening over past 2 wks (inability to get up to go to bathroom). PMH insign otherwise. Exam: See above. Clinical Presentation: See above. Patient Education: Role of PT, POC, Need to call for assist, OOB via [employer]. Barriers to Learning: None. REQUIRES PT FOLLOW UP: Yes  Activity Tolerance  Activity Tolerance: Patient limited by endurance  Activity Tolerance: Pt currently requiring O2 10L; does desat briefly low 80's following functional activities although quickly recovers. Pt dianna oob via [employer] assist.       Patient Diagnosis(es): The primary encounter diagnosis was COVID-19.  Diagnoses of Generalized weakness, Acute respiratory failure with hypoxia ([address] and Atypical pneumonia were also pertinent to this visit.     has no past medical history on file. has no past surgical history on file. Restrictions  Restrictions/Precautions  Restrictions/Precautions: Fall Risk,Isolation  Position Activity Restriction  Other position/activity restrictions: Droplet Plus : COVID +. O2 10L via High Flow. Vision/Hearing  Vision: Within Functional Limits  Hearing: Exceptions to [facility]  Hearing Exceptions: Hard of hearing/hearing concerns     Subjective  General  Chart Reviewed: Yes  Patient assessed for rehabilitation services?: Yes  Additional Pertinent Hx: 63 y/o male admit 1/14/2022 with Acute Respiratory, Pneumonia, COVID+. Reports generalized weakness; worsening over past 2 wks (inability to get up to go to bathroom). PMH insign otherwise. Family / Caregiver Present: No  Referring Practitioner: Dr. Olayinka Choi. Diagnosis: Acute Respiratory, Pneumonia, COVID+. Follows Commands: Within Functional Limits  Subjective  Subjective: Pt agreeable to PT Eval/Rx. Orientation  Orientation  Overall Orientation Status: Within Functional Limits  Social/Functional History  Social/Functional History  Lives With: Family (Niece and her .)  Type of Home: Apartment (1st floor apt.)  Home Layout: One level,Laundry in basement  Home Access: Stairs to enter with rails (4+4 steps to enter.)  Bathroom Shower/Tub: Tub/Shower unit  Bathroom Toilet: Standard  Bathroom Accessibility: Accessible  Home Equipment:  (No DME.)  ADL Assistance: Independent  Homemaking Assistance:  (Family takes care of laundry.)  Ambulation Assistance: Independent (Without assist device pta.)  Transfer Assistance: Independent  Active : Yes  Occupation: Retired  Type of occupation: Worked for [employer]) company. Additional Comments: Family works; limited ability to assist upon d/c. Cognition   Cognition  Overall Cognitive Status: Exceptions  Arousal/Alertness: Appropriate responses to stimuli  Following Commands:  Follows one step commands consistently  Insights: Decreased awareness of deficits    Objective          AROM RLE (degrees)  RLE AROM: WFL  AROM LLE (degrees)  LLE AROM : WFL  AROM RUE (degrees)  RUE AROM : WFL  AROM LUE (degrees)  LUE AROM : WFL  Strength Other  Other: Grossly 4- 4/5; limited endurance/dianna to activity. Sensation  Overall Sensation Status: WFL  Bed mobility  Rolling to Left: Contact guard assistance  Rolling to Right: Contact guard assistance  Supine to Sit: Minimal assistance  Comment: Pt able to assist Rolling R/L for pericare/don clean depends. Transfers  Sit to Stand: Minimal Assistance ([employer]  Stand to sit: Minimal Assistance ([employer]  Bed to Chair: Dependent/Total ([employer]  Comment: Pt able to complete additional Transfer from chair via [employer] assist.  Ambulation  Ambulation?: No (OOB via [employer].)     Balance  Comments: EOB SBA. Brief Stand during use of [employer] Min assist.        Plan   Plan  Times per week: 3-5x week while in acute care setting. Current Treatment Recommendations: Strengthening,Functional Mobility Training,Transfer Training,Gait Training,Safety Education & Training,Patient/Caregiver Education & Training  Safety Devices  Type of devices: Call light within reach,Chair alarm in place,Left in chair,Nurse notified                     AM-PAC Score  AM-PAC Inpatient Mobility Raw Score : 12 (01/16/22 1009)  AM-PAC Inpatient T-Scale Score : 35.33 (01/16/22 1009)  Mobility Inpatient CMS 0-100% Score: 68.66 (01/16/22 1009)  Mobility Inpatient CMS G-Code Modifier : CL (01/16/22 1009)          Goals  Short term goals  Time Frame for Short term goals: Upon d/c acute care setting. Short term goal 1: Bed Mob SBA. Short term goal 2: Transfers with/without assist device CGA. Short term goal 3: Amb with/without assist device 22' CGA; sats remain at least high 80's/low 90's. Short term goal 4: Pt participating in approp Strength Exs. Patient Goals   Patient goals : Get stronger and return home. Therapy Time   Individual Concurrent Group Co-treatment   Time In 0840         Time Out 0920         Minutes [address], PT

## 2022-01-16 NOTE — PROGRESS NOTES
Hemoglobin of 6.8. Darin Regan MD paged. Will continue to monitor.     Electronically signed by Maria Dolores Nicholas RN on 1/16/2022 at 10:47 AM

## 2022-01-16 NOTE — PROGRESS NOTES
Pt is alert and oriented and agreeable to Actemra and blood transfusion. Consent signed. Will continue to monitor.     Electronically signed by Yudelka Hsieh RN on 1/16/2022 at 3:46 PM

## 2022-01-17 NOTE — PLAN OF CARE
Problem: Falls - Risk of:  Goal: Will remain free from falls  Description: Will remain free from falls  1/17/2022 0446 by Merlin Janus, RN  Outcome: Ongoing  1/16/2022 1717 by Chava Lopez RN  Outcome: Ongoing  Goal: Absence of physical injury  Description: Absence of physical injury  1/17/2022 0446 by Merlin Janus, RN  Outcome: Ongoing  1/16/2022 1717 by Chava Lopez RN  Outcome: Ongoing     Problem: Airway Clearance - Ineffective  Goal: Achieve or maintain patent airway  1/17/2022 0446 by Merlin Janus, RN  Outcome: Ongoing  1/16/2022 1717 by Chava Lopez RN  Outcome: Ongoing     Problem: Gas Exchange - Impaired  Goal: Absence of hypoxia  1/17/2022 0446 by Merlin Janus, RN  Outcome: Ongoing  1/16/2022 1717 by Chava Lopez RN  Outcome: Ongoing  Goal: Promote optimal lung function  1/17/2022 0446 by Merlin Janus, RN  Outcome: Ongoing  1/16/2022 1717 by Chava Lopez RN  Outcome: Ongoing     Problem: Breathing Pattern - Ineffective  Goal: Ability to achieve and maintain a regular respiratory rate  1/17/2022 0446 by Merlin Janus, RN  Outcome: Ongoing  1/16/2022 1717 by Chava Lopez RN  Outcome: Ongoing     Problem: Body Temperature -  Risk of, Imbalanced  Goal: Ability to maintain a body temperature within defined limits  1/17/2022 0446 by Merlin Janus, RN  Outcome: Ongoing  1/16/2022 1717 by Chava Lopez RN  Outcome: Ongoing     Problem:  Body Temperature -  Risk of, Imbalanced  Goal: Ability to maintain a body temperature within defined limits  1/17/2022 0446 by Merlin Janus, RN  Outcome: Ongoing  1/16/2022 1717 by Chava Lopez RN  Outcome: Ongoing  Goal: Will regain or maintain usual level of consciousness  1/17/2022 0446 by Merlin Janus, RN  Outcome: Ongoing  1/16/2022 1717 by Chava Lopez RN  Outcome: Ongoing  Goal: Complications related to the disease process, condition or treatment will be avoided or minimized  1/17/2022 0446 by Mortimer Manus JERMAINE Harper  Outcome: Ongoing  1/16/2022 1717 by Maribel Sanchez RN  Outcome: Ongoing     Problem: Isolation Precautions - Risk of Spread of Infection  Goal: Prevent transmission of infection  1/17/2022 0446 by Maggy Sotelo RN  Outcome: Ongoing  1/16/2022 1717 by Maribel Sanchez RN  Outcome: Ongoing     Problem: Nutrition Deficits  Goal: Optimize nutritional status  1/17/2022 0446 by Maggy Sotelo RN  Outcome: Ongoing  1/16/2022 1717 by Maribel Sanchez RN  Outcome: Ongoing     Problem: Risk for Fluid Volume Deficit  Goal: Maintain normal heart rhythm  1/17/2022 0446 by Maggy Sotelo RN  Outcome: Ongoing  1/16/2022 1717 by Maribel Sanchez RN  Outcome: Ongoing  Goal: Maintain absence of muscle cramping  1/17/2022 0446 by Maggy Sotelo RN  Outcome: Ongoing  1/16/2022 1717 by Maribel Sanchez RN  Outcome: Ongoing  Goal: Maintain normal serum potassium, sodium, calcium, phosphorus, and pH  1/17/2022 0446 by Maggy Sotelo RN  Outcome: Ongoing  1/16/2022 1717 by Maribel Sanchez RN  Outcome: Ongoing     Problem: Loneliness or Risk for Loneliness  Goal: Demonstrate positive use of time alone when socialization is not possible  1/17/2022 0446 by Maggy Sotelo RN  Outcome: Ongoing  1/16/2022 1717 by Maribel Sanchez RN  Outcome: Ongoing     Problem: Fatigue  Goal: Verbalize increase energy and improved vitality  1/17/2022 0446 by Maggy Sotelo RN  Outcome: Ongoing  1/16/2022 1717 by Maribel Sanchez RN  Outcome: Ongoing     Problem: Patient Education: Go to Patient Education Activity  Goal: Patient/Family Education  1/17/2022 0446 by Maggy Sotelo RN  Outcome: Ongoing  1/16/2022 1717 by Maribel Sanchez RN  Outcome: Ongoing     Problem: Skin Integrity:  Goal: Will show no infection signs and symptoms  Description: Will show no infection signs and symptoms  1/17/2022 0446 by Maggy Sotelo RN  Outcome: Ongoing  1/16/2022 1717 by Maribel Sanchez RN  Outcome: Ongoing  Goal: Absence of new skin breakdown  Description: Absence of new skin breakdown  1/17/2022 0446 by Viktor Haynes RN  Outcome: Ongoing  1/16/2022 1717 by Denise Rucker RN  Outcome: Ongoing

## 2022-01-17 NOTE — PROGRESS NOTES
Hospitalist Medicine   Progress Note    Date of Service: 01/17/22  MRN: [id number]  LOS: 3    CC:  Fatigue    Summary:   Patient is a 73-year-old male without significant past medical history who presented to hospital for fevers chills for about 2 weeks.  Patient has been feeling tired fatigue and having generalized body aches for 2 weeks, he also mentions he has cough, nonproductive.  Denied chest pain nausea vomiting diarrhea constipation. INTERVAL HISTORY:     No acute events overnight. Up in chair today. Not using incentive spirometer. O2 without improvement, on 15L via NC.       MEDICATIONS:   Scheduled Meds:   azithromycin  250 mg Oral Nightly    furosemide  40 mg IntraVENous Daily    dexamethasone  10 mg IntraVENous Q24H    sodium chloride flush  10 mL IntraVENous 2 times per day    [Held by provider] enoxaparin  30 mg SubCUTAneous BID    cefTRIAXone (ROCEPHIN) IV  1,000 mg IntraVENous Q24H     Continuous Infusions:   sodium chloride      sodium chloride 25 mL (01/17/22 0207)         PHYSICAL EXAM:   BP (!) 128/53   Pulse 89   Temp 97.7 °F (36.5 °C) (Axillary)   Resp 18   Ht 6' 2\" (1.88 m)   Wt 154 lb 8.7 oz (70.1 kg)   SpO2 97%   BMI 19.84 kg/m²     Physical Exam  Vitals reviewed. Constitutional:       General: He is not in acute distress. Appearance: Normal appearance. He is well-developed. He is not diaphoretic. HENT:      Head: Normocephalic and atraumatic. Mouth/Throat:      Mouth: Mucous membranes are moist.   Eyes:      Extraocular Movements: Extraocular movements intact. Pupils: Pupils are equal, round, and reactive to light. Cardiovascular:      Rate and Rhythm: Normal rate and regular rhythm. Pulses: Normal pulses. Heart sounds: Normal heart sounds. No murmur heard. No friction rub. No gallop. Pulmonary:      Effort: No respiratory distress. Breath sounds: No wheezing or rales.       Comments: Clear but diminished breath sounds  Chest: Chest wall: No tenderness. Abdominal:      General: Bowel sounds are normal. There is no distension. Palpations: Abdomen is soft. There is no mass. Tenderness: There is no abdominal tenderness. There is no guarding. Musculoskeletal:         General: No tenderness or deformity. Cervical back: Normal range of motion and neck supple. Right lower leg: No edema. Left lower leg: No edema. Skin:     General: Skin is warm and dry. Capillary Refill: Capillary refill takes less than 2 seconds. Neurological:      General: No focal deficit present. Mental Status: He is alert and oriented to person, place, and time. Psychiatric:         Behavior: Behavior normal.             LABS / IMAGING:     Recent Labs     01/15/22  0812 01/15/22  0812 01/16/22  0931 01/16/22  1315 01/17/22  0730   WBC 10.0  --  8.2  --  7.5   HGB 7.1*  --  6.8*  --  7.7*   HCT 20.5*   < > 19.6* 19.8* 22.4*     --  228  --  159    < > = values in this interval not displayed. Recent Labs     01/15/22  0812 01/16/22  0931 01/17/22  0730    136 141   K 3.5 3.2* 3.6    97* 103   CO2 21 25 25   BUN 59* 62* 57*   CREATININE 0.8 0.8 0.7*   CALCIUM 8.6 8.4 8.0*     No results for input(s): AST, ALT, BILIDIR, BILITOT, ALKPHOS in the last 72 hours. Recent Labs     01/16/22  1315   INR 1.44*     Recent Labs     01/14/22  2130 01/16/22  1315   TROPONINI 0.04* 0.04*       XR CHEST 1 VIEW   Final Result   Patchy infiltrates within both lungs, most compatible with multifocal   pneumonia, especially COVID-19 pneumonia. All in all, findings are similar   when compared to the previous exam from yesterday. XR CHEST PORTABLE   Final Result   Ground-glass and interstitial opacities, indeterminate for pulmonary edema or   atypical pneumonia, including COVID. Minimal consolidation in the left base, asymmetrical airspace disease or   atelectasis.                ASSESSMENT:     Acute hypoxic respiratory failure (unchanged)  Sepsis present on admission likely secondary to   COVID-19 pneumonia  Acute blood loss anemia   - rule out GI bleed  Elevated troponin likely demand ischemia  Probable diastolic congestive heart failure 2/2 cor pulmonale    Remains on 15L high flow via NC. S/p Actemra. No active sign of bleeding, possibly GI. S/p 1 unit pRBC, Hgb 7.7 today  BNP improved. INR elevated. PLAN:     - LFTs daily  - FOBT pending  - dexamethasone 10mg daily (day 4)  - lasix 40mg PO daily   - IV ceftriaxone/azithro  - mucinex 600mg BID  - must encourage incentive spirometry  - continue high flow O2, keep sats > 92%  - wean O2 as tolerated  - PT/OT        Dispo: Inpatient med/surg.     PPx:     SCDs  PT/OT:    Ordered   Code status:  Full Code         Leola Clemons MD  Hospitalist

## 2022-01-17 NOTE — ACP (ADVANCE CARE PLANNING)
Advance Care Planning     Advance Care Planning Activator (Inpatient)  Conversation Note      Date of ACP Conversation: 1/17/2022     Conversation Conducted with: Patient with Decision Making Capacity    ACP Activator: Farida Brunner RN    Health Care Decision Maker:     Current Designated Health Care Decision Maker:     Primary Decision Maker: Bennylisa Garcia - Niece/Nephew    Secondary Decision Maker: Allyson Carias - Niece/Nephew - [phone]    Supplemental (Other) Decision Maker: Fabiola Blankenship - Niece/Nephew - [phone]  Today we discussed advanced directives, living will, HCPOA, and code status. Pt stated he does not have a HCPOA. He is ok with CPR and intubation but does not want long-term life support. Care Preferences    Ventilation: \"If you were in your present state of health and suddenly became very ill and were unable to breathe on your own, what would your preference be about the use of a ventilator (breathing machine) if it were available to you? \"      Would the patient desire the use of ventilator (breathing machine)?: yes    \"If your health worsens and it becomes clear that your chance of recovery is unlikely, what would your preference be about the use of a ventilator (breathing machine) if it were available to you? \"     Would the patient desire the use of ventilator (breathing machine)?: No      Resuscitation  \"CPR works best to restart the heart when there is a sudden event, like a heart attack, in someone who is otherwise healthy. Unfortunately, CPR does not typically restart the heart for people who have serious health conditions or who are very sick. \"    \"In the event your heart stopped as a result of an underlying serious health condition, would you want attempts to be made to restart your heart (answer \"yes\" for attempt to resuscitate) or would you prefer a natural death (answer \"no\" for do not attempt to resuscitate)? \" yes       [x] Yes   [] No   Educated Patient / Louisa Corey regarding differences between Advance Directives and portable DNR orders.     Length of ACP Conversation in minutes:  5    Conversation Outcomes:  [x] ACP discussion completed  [] Existing advance directive reviewed with patient; no changes to patient's previously recorded wishes  [] New Advance Directive completed  [] Portable Do Not Rescitate prepared for Provider review and signature  [] POLST/POST/MOLST/MOST prepared for Provider review and signature      Follow-up plan:    [] Schedule follow-up conversation to continue planning  [] Referred individual to Provider for additional questions/concerns   [] Advised patient/agent/surrogate to review completed ACP document and update if needed with changes in condition, patient preferences or care setting    [x] This note routed to one or more involved healthcare providers    Electronically signed by Wolfgang Cowden, MADONNA RN-BC [id number]

## 2022-01-17 NOTE — PROGRESS NOTES
Occupational Therapy   Occupational Therapy Initial Assessment and Tentative D/C    Date: 2022   Patient Name: Alcon Granados  MRN: [id number]     : 1945    Date of Service: 2022    Discharge Recommendations: Alcon Granados scored a 15/24 on the AM-PAC ADL Inpatient form. Current research shows that an AM-PAC score of 17 or less is typically not associated with a discharge to the patient's home setting. If patient discharges prior to next session this note will serve as a discharge summary. Please see below for the latest assessment towards goals. Continue to assess pending progress,Patient would benefit from continued therapy after discharge (unsafe to return home at this time)  OT Equipment Recommendations  Other: continue to assess    Assessment   Performance deficits / Impairments: Decreased functional mobility ; Decreased ADL status; Decreased balance;Decreased strength;Decreased endurance;Decreased high-level IADLs  Assessment: Patient is a 64-year-old male without significant past medical history who presented to hospital for fevers chills for about 2 weeks. Patient has been feeling tired fatigue and having generalized body aches for 2 weeks, he also mentions he has cough, nonproductive. Denied chest pain nausea vomiting diarrhea constipation. PTA pt from home with family where pt was Ind with mobility and ADLs. Pt currently functioning below baseline completing mobility and transfers with Min A with RW. Anticipate pt needing up to Mod/Max A for ADLs. Pt on 15L O2 throughout with SpO2 decreasing into mid/low 80s and returns to >88% with seated rest break. Pt will benefit from skilled OT services at this time. Will continue to assess for D/C needs.   Prognosis: Fair;Good  Decision Making: Medium Complexity  Exam: see above  Assistance / Modification: RW  OT Education: Plan of Care;OT Role;Transfer Training  REQUIRES OT FOLLOW UP: Yes  Activity Tolerance  Activity Tolerance: Patient Tolerated treatment well;Patient limited by fatigue  Safety Devices  Safety Devices in place: Yes  Type of devices: Left in chair;Call light within reach; Chair alarm in place;Nurse notified           Patient Diagnosis(es): The primary encounter diagnosis was COVID-19. Diagnoses of Generalized weakness, Acute respiratory failure with hypoxia ([address] and Atypical pneumonia were also pertinent to this visit. has no past medical history on file. has no past surgical history on file. Restrictions  Restrictions/Precautions  Restrictions/Precautions: Fall Risk,Isolation  Position Activity Restriction  Other position/activity restrictions: Droplet Plus : COVID +. O2 15L via High Flow. Subjective   General  Chart Reviewed: Yes  Patient assessed for rehabilitation services?: Yes  Additional Pertinent Hx: per MD note, Patient is a 68-year-old male without significant past medical history who presented to hospital for fevers chills for about 2 weeks. Patient has been feeling tired fatigue and having generalized body aches for 2 weeks, he also mentions he has cough, nonproductive. Denied chest pain nausea vomiting diarrhea constipation. Family / Caregiver Present: No  Referring Practitioner: Wellington Rebollar MD  Subjective  Subjective: Pt agreeable to OT evaluation. Pt with no reports of pain. Pt on 15L O2 throughout. General Comment  Comments: okay for therapy per RN.   Patient Currently in Pain: Yes  Pain Assessment  Pain Assessment: 0-10  Pain Level: 5  Patient's Stated Pain Goal: No pain  Pain Type: Chronic pain  Pain Location: Leg  Pain Orientation: Right  Pain Descriptors: Aching  Pain Frequency: Continuous  Pain Onset: On-going  Clinical Progression: Not changed  Functional Pain Assessment: Activities are not prevented  Non-Pharmaceutical Pain Intervention(s): Repositioned  Vital Signs  Temp: 98.1 °F (36.7 °C)  Temp Source: Oral  Pulse: 95  Heart Rate Source: Telemetry  Resp: 24  BP: 127/70  BP Location: Left upper arm  MAP (mmHg): 89  Level of Consciousness: Alert (0)  MEWS Score: 2  Patient Currently in Pain: Yes  Oxygen Therapy  SpO2: 93 %  Pulse Oximeter Device Mode: Continuous  Pulse Oximeter Device Location: Finger  O2 Device: High flow nasal cannula  O2 Flow Rate (L/min): 15 L/min  Social/Functional History  Social/Functional History  Lives With: Family (Niece and her .)  Type of Home: Apartment (1st floor apt.)  Home Layout: One level,Laundry in basement  Home Access: Stairs to enter with rails (4+4 steps to enter.)  Bathroom Shower/Tub: Tub/Shower unit  Bathroom Toilet: Standard  Bathroom Accessibility: Accessible  Home Equipment:  (No DME.)  ADL Assistance: Independent  Homemaking Assistance:  (Family takes care of laundry.)  Ambulation Assistance: Independent (Without assist device pta.)  Transfer Assistance: Independent  Active : Yes  Occupation: Retired  Type of occupation: Worked for [employer]) company.   Additional Comments: Family works; limited ability to assist upon d/c.       Objective   Vision: Within Functional Limits  Hearing: Exceptions to [facility]  Hearing Exceptions: Hard of hearing/hearing concerns    Orientation  Overall Orientation Status: Within Functional Limits     Balance  Sitting Balance: Stand by assistance (seated EOB)  Standing Balance: Contact guard assistance (RW)  Functional Mobility  Functional - Mobility Device: Rolling Walker  Activity: Other (~3ft)  Assist Level: Minimal assistance  Functional Mobility Comments: SpO2 decreasing into mid 80s and returns to >88% with seated restb break  Wheelchair Bed Transfers  Wheelchair/Bed - Technique: Ambulating (RW)  Equipment Used: Bed;Other (bed to chair)  Level of Asssistance: Minimal assistance  ADL  Feeding: Setup  Additional Comments: Anticipate pt needing up to Mod/Max A for ADLs based on ROM, strength, and balance  Tone RUE  RUE Tone: Normotonic  Tone LUE  LUE Tone: Normotonic  Coordination  Movements Are Fluid And Coordinated: Yes     Bed mobility  Supine to Sit: Minimal assistance  Sit to Supine: Unable to assess  Scooting: Unable to assess  Transfers  Sit to stand: Minimal assistance  Stand to sit: Minimal assistance  Transfer Comments: to/from RW; cues for hand placement; completed x2 reps     Cognition  Overall Cognitive Status: Exceptions  Arousal/Alertness: Appropriate responses to stimuli  Following Commands:  Follows one step commands consistently  Insights: Decreased awareness of deficits        Sensation  Overall Sensation Status: Impaired  Light Touch: Partial deficits in the LUE (reports numbness in R hand)        LUE AROM (degrees)  LUE AROM : WFL  RUE AROM (degrees)  RUE AROM : WFL  LUE Strength  Gross LUE Strength: WFL  RUE Strength  Gross RUE Strength: WFL                   Plan   Plan  Times per week: 3-5x  Current Treatment Recommendations: Strengthening,Functional Mobility Training,Gait Training,Endurance Training,Balance Training,Safety Education & Training,Self-Care / ADL,Equipment Evaluation, Education, & procurement,Patient/Caregiver Education & Training      AM-PAC Score        AM-PAC Inpatient Daily Activity Raw Score: 15 (01/17/22 0854)  AM-PAC Inpatient ADL T-Scale Score : 34.69 (01/17/22 0854)  ADL Inpatient CMS 0-100% Score: 56.46 (01/17/22 0854)  ADL Inpatient CMS G-Code Modifier : CK (01/17/22 0854)    Goals  Short term goals  Time Frame for Short term goals: prior to D/C  Short term goal 1: complete functional mobility and transfers Mod Ind  Short term goal 2: complete bathing and dressing Mod Ind  Short term goal 3: complete toileting Mod Ind  Short term goal 4: complete grooming in stance at sink Mod Ind  Long term goals  Time Frame for Long term goals : STG=LTG  Patient Goals   Patient goals : no stated goals       Therapy Time   Individual Concurrent Group Co-treatment   Time In 0820         Time Out 0845         Minutes 25         Timed Code Treatment Minutes: 25 Minutes       Ramila Valdez, OTR/L

## 2022-01-17 NOTE — PROGRESS NOTES
Respiratory Therapy    Pt declining BIPAP   SPO2 96% on 14L HFNC    Electronically signed by Indira Mejia RCP on 1/16/2022 at 8:38 PM

## 2022-01-17 NOTE — PROGRESS NOTES
Physical Therapy  Facility/Department: [address] PROGRESSIVE CARE  Daily Treatment Note  NAME: Jenaro Harman  : 1945  MRN: [id number]    Date of Service: 2022    Discharge Recommendations:  Continue to assess pending progress   PT Equipment Recommendations  Other: Will monitor for potential equipt needs. Assessment   Body structures, Functions, Activity limitations: Decreased functional mobility ; Decreased strength;Decreased endurance;Decreased balance  Assessment: 65 y/o male admit 2022 with Acute Respiratory, Pneumonia, COVID+. Reports generalized weakness; worsening over past 2 wks (inability to get up to go to bathroom). PMH insign otherwise. PTA pt living with family (both work) in apt setting with steps to enter/access 1st floor apt; independent daily care and functional mobility prior. Pt currently requiring O2 15L; does desat briefly low 80's following functional activities although quickly recovers. Pt dianna Transfer bed to chair with Edda Feather assist.  Very limited endurance. Will monitor pt's progress; possible need cont PT (3-5) upon d/c. Prognosis: Good  Decision Making: Medium Complexity  History: 65 y/o male admit 2022 with Acute Respiratory, Pneumonia, COVID+. Reports generalized weakness; worsening over past 2 wks (inability to get up to go to bathroom). PMH insign otherwise. Exam: See above. Clinical Presentation: See above. Patient Education: Role of PT, POC, Need to call for assist, Safe use of Walker. Barriers to Learning: None. REQUIRES PT FOLLOW UP: Yes  Activity Tolerance  Activity Tolerance: Patient limited by endurance  Activity Tolerance: Pt currently requiring O2 15L; does desat briefly low 80's following functional activities although quickly recovers. Pt dianna Transfer bed to chair with Edda Feather assist.  Very limited endurance. Patient Diagnosis(es): The primary encounter diagnosis was COVID-19.  Diagnoses of Generalized weakness, Acute respiratory failure with hypoxia ([facility] [address] and Atypical pneumonia were also pertinent to this visit. has no past medical history on file. has no past surgical history on file. Restrictions  Restrictions/Precautions  Restrictions/Precautions: Fall Risk,Isolation  Position Activity Restriction  Other position/activity restrictions: Droplet Plus : COVID +. O2 15L via High Flow. Subjective   General  Chart Reviewed: Yes  Additional Pertinent Hx: 63 y/o male admit 1/14/2022 with Acute Respiratory, Pneumonia, COVID+. Reports generalized weakness; worsening over past 2 wks (inability to get up to go to bathroom). PMH insign otherwise. Response To Previous Treatment: Patient with no complaints from previous session. Family / Caregiver Present: No  Referring Practitioner: Dr. Ifeanyi Morgan. Subjective  Subjective: Pt agreeable to PT Rx. Orientation  Orientation  Overall Orientation Status: Within Functional Limits  Cognition   Cognition  Overall Cognitive Status: Exceptions  Arousal/Alertness: Appropriate responses to stimuli  Following Commands: Follows one step commands consistently  Insights: Decreased awareness of deficits  Objective   Bed mobility  Supine to Sit: Minimal assistance (HOB elevated.)  Transfers  Sit to Stand: Minimal Assistance (With [employer]. Cues for safe hand placement.)  Stand to sit: Minimal Assistance (With [employer]. Cues for safe hand placement.)  Comment: Additional Transfer with Walker following seated rest; Min assist.  Ambulation  Ambulation?: Yes  Ambulation 1  Surface: level tile  Device: Rolling Walker  Distance: Pt amb ~4-5 steps bed to chair with [employer] assist.  Weak; limited endurance. Balance  Comments: Additional Transfer and Static Stand with [employer] assist; dianna ~ 10 sec.                     AM-PAC Score  AM-PAC Inpatient Mobility Raw Score : 15 (01/17/22 1012)  AM-PAC Inpatient T-Scale Score : 39.45 (01/17/22 1012)  Mobility Inpatient CMS 0-100% Score: 57.7 (01/17/22 1012)  Mobility Inpatient CMS G-Code Modifier : CK (01/17/22 1012)          Goals  Short term goals  Time Frame for Short term goals: Upon d/c acute care setting. Short term goal 1: Bed Mob SBA. Short term goal 2: Transfers with/without assist device CGA. Short term goal 3: Amb with/without assist device 22' CGA; sats remain at least high 80's/low 90's. Short term goal 4: Pt participating in approp Strength Exs. Patient Goals   Patient goals : Get stronger and return home. Plan    Plan  Times per week: 3-5x week while in acute care setting.   Current Treatment Recommendations: Strengthening,Functional Mobility Training,Transfer Training,Gait Training,Safety Education & Training,Patient/Caregiver Education & Training  Safety Devices  Type of devices: Call light within reach,Chair alarm in place,Left in chair,Nurse notified     Therapy Time   Individual Concurrent Group Co-treatment   Time In 0820         Time Out 0845         Minutes [address] [place] [address],

## 2022-01-17 NOTE — PLAN OF CARE
Problem: Falls - Risk of:  Goal: Will remain free from falls  Description: Will remain free from falls  1/17/2022 1045 by Margaret Werner RN  Outcome: Ongoing  Note: Fall precautions in place. Bed locked and in lowest position. Alarm on. Call light within reach. 1/17/2022 0446 by Merlin Janus, RN  Outcome: Ongoing  Goal: Absence of physical injury  Description: Absence of physical injury  1/17/2022 1045 by Margaret Werner RN  Outcome: Ongoing  1/17/2022 0446 by Merlin Janus, RN  Outcome: Ongoing     Problem: Airway Clearance - Ineffective  Goal: Achieve or maintain patent airway  1/17/2022 1045 by Margaret Werner RN  Outcome: Ongoing  1/17/2022 0446 by Merlin Janus, RN  Outcome: Ongoing     Problem: Gas Exchange - Impaired  Goal: Absence of hypoxia  1/17/2022 1045 by Margaret Werner RN  Outcome: Ongoing  1/17/2022 0446 by Merlin Janus, RN  Outcome: Ongoing  Goal: Promote optimal lung function  1/17/2022 1045 by Margaret Werner RN  Outcome: Ongoing  1/17/2022 0446 by Merlin Janus, RN  Outcome: Ongoing     Problem: Breathing Pattern - Ineffective  Goal: Ability to achieve and maintain a regular respiratory rate  1/17/2022 1045 by Margaret Werner RN  Outcome: Ongoing  1/17/2022 0446 by Merlin Janus, RN  Outcome: Ongoing     Problem:  Body Temperature -  Risk of, Imbalanced  Goal: Ability to maintain a body temperature within defined limits  1/17/2022 1045 by Margaret Werner RN  Outcome: Ongoing  1/17/2022 0446 by Merlin Janus, RN  Outcome: Ongoing  Goal: Will regain or maintain usual level of consciousness  1/17/2022 1045 by Margaret Werner RN  Outcome: Ongoing  1/17/2022 0446 by Merlin Janus, RN  Outcome: Ongoing  Goal: Complications related to the disease process, condition or treatment will be avoided or minimized  1/17/2022 1045 by Margaret Werner RN  Outcome: Ongoing  1/17/2022 0446 by Merlin Janus, RN  Outcome: Ongoing     Problem: Isolation Precautions - Risk of Spread of Infection  Goal: Prevent level will decrease  Description: Pain level will decrease  Outcome: Ongoing  Goal: Control of acute pain  Description: Control of acute pain  Outcome: Ongoing  Goal: Control of chronic pain  Description: Control of chronic pain  Outcome: Ongoing

## 2022-01-17 NOTE — CARE COORDINATION
Case Management Assessment           Initial Evaluation                Date / Time of Evaluation: 1/17/2022 4:38 PM                 Assessment Completed by: Emelyn Pinto RN     Pt's chart reviewed. He is presently in isolation for COVID-19. A call was placed to the room to complete initial assessment. Mr. Joni Causey is alert and oriented x 4, pleasant, short of breath with conversation. He stated he lives with his niece and nephew in an apartment. His nephew, Cheng Poon, if the one who lives with him and helps with medications, food preparation, and does the housekeeping. He is not active with any home care services at this time. He stated he has not been to a doctor in over 10 years. He is open to a new primary care physician list and home care at discharge if it is recommended. Discussed skilled nursing facilities. He wants to talk to his nephews and other family before further discussion of skilled nursing facilities. He is currently on 15L O2 HFNC, IV ABX, IV Decadron, IV Lasix. Patient Name: Lucy Jones     YOB: 1945  Diagnosis: Hypoxia [R09.02]  Generalized weakness [R53.1]  Atypical pneumonia [J18.9]  Acute respiratory failure with hypoxia ([facility] [address] [J96.01]  COVID-19 [U07.1]     Date / Time: 1/14/2022  1:40 PM    Patient Admission Status: Inpatient    Current PCP: No primary care provider on file. Chart Reviewed: Yes  Patient/ Family Interviewed: Yes    Emergency Contacts:  Extended Emergency Contact Information  Primary Emergency Contact: Aquiles Duff  Mobile Phone: [phone]  Relation: Niece/Nephew  Preferred language: English   needed? No  Secondary Emergency Contact: Johanaisrraelclementina Kervin  Mobile Phone: [phone]  Relation: Niece/Nephew  Preferred language: English   needed?  No    Advance Directives:   Code Status: Full Code    Financial  Payor: Serjio Davila / Plan: ADVANTAGE [facility] REPLACEMENT / Product Type: *No Product type* / Pharmacy  No Pharmacies Listed    Potential assistance Purchasing Medications: Potential Assistance Purchasing Medications: No  Does Patient want to participate in local refill/ meds to beds program?: Yes    Meds To Beds General Rules:  1. Can ONLY be done Monday- Friday between 8:30am-5pm  2. Prescription(s) must be in pharmacy by 3pm to be filled same day  3. Copy of patient's insurance/ prescription drug card and patient face sheet must be sent along with the prescription(s)  4. Cost of Rx cannot be added to hospital bill. If financial assistance is needed, please contact unit  or ;  or  CANNOT provide pharmacy voucher for patients co-pays  5. Patients can then  the prescription on their way out of the hospital at discharge, or pharmacy can deliver to the bedside if staff is available. (payment due at time of pick-up or delivery - cash, check, or card accepted)       ADLS Independent prior to admission with support from his family  Support Systems: Family Members    PT AM-PAC: 15 /24  OT AM-PAC: 15 /24    [place]: 1st floor apartment  Steps: 1-2 SHILA    Plans to RETURN to current housing: Yes    [address]  Currently ACTIVE with [address]: No    Currently ACTIVE with [employer] [place] [facility]: No    Durable Medical Equipment  Equipment: cane    Home Oxygen and [address] prior to admission: No     DISCHARGE PLAN:  Disposition: Home with [address]: TBD. Pt is open to [address] for discharge: family     The Patient and/or patient representative Timmy Chacon and his family were provided with a choice of provider and agrees with the discharge plan Yes    Freedom of choice list was provided with basic dialogue that supports the patient's individualized plan of care/goals and shares the quality data associated with the providers.  Yes      Casey Eid RN  Case

## 2022-01-18 NOTE — PROGRESS NOTES
Pt encouraged to use incentive spirometer. Pt responded \"I can't\". Pt encourage again and was able to complete 3 times barely reaching 500. Pt then refused to do again.    Electronically signed by Irvin Espinal RN on 1/18/22 at 2:17 PM EST

## 2022-01-18 NOTE — PLAN OF CARE
Problem: Falls - Risk of:  Goal: Will remain free from falls  Description: Will remain free from falls  1/18/2022 1121 by Jodee Eid RN  Outcome: Ongoing  Note: Fall precautions in place. Bed locked and in lowest position. Alarm on. Call light within reach. 1/18/2022 0303 by Cesar Bateman RN  Outcome: Ongoing  Goal: Absence of physical injury  Description: Absence of physical injury  1/18/2022 1121 by Jodee Eid RN  Outcome: Ongoing  1/18/2022 0303 by Cesar Bateman RN  Outcome: Ongoing     Problem: Airway Clearance - Ineffective  Goal: Achieve or maintain patent airway  1/18/2022 1121 by Jodee Eid RN  Outcome: Ongoing  1/18/2022 0303 by Cesar Bateman RN  Outcome: Ongoing     Problem: Gas Exchange - Impaired  Goal: Absence of hypoxia  1/18/2022 1121 by Jodee Eid RN  Outcome: Ongoing  1/18/2022 0303 by Cesar Bateman RN  Outcome: Ongoing  Goal: Promote optimal lung function  1/18/2022 1121 by Jodee Eid RN  Outcome: Ongoing  1/18/2022 0303 by Cesar Bateman RN  Outcome: Ongoing     Problem: Breathing Pattern - Ineffective  Goal: Ability to achieve and maintain a regular respiratory rate  1/18/2022 1121 by Jodee Eid RN  Outcome: Ongoing  1/18/2022 0303 by Cesar Bateman RN  Outcome: Ongoing     Problem:  Body Temperature -  Risk of, Imbalanced  Goal: Ability to maintain a body temperature within defined limits  1/18/2022 1121 by Jodee Eid RN  Outcome: Ongoing  1/18/2022 0303 by Cesar Bateman RN  Outcome: Ongoing  Goal: Will regain or maintain usual level of consciousness  1/18/2022 1121 by Jodee Eid RN  Outcome: Ongoing  1/18/2022 0303 by Cesar Bateman RN  Outcome: Ongoing  Goal: Complications related to the disease process, condition or treatment will be avoided or minimized  1/18/2022 1121 by Jodee Eid RN  Outcome: Ongoing  1/18/2022 0303 by Cesar Bateman RN  Outcome: Ongoing     Problem: Isolation Precautions - Risk of Spread of Infection  Goal: Prevent transmission of infection  1/18/2022 1121 by Angelique Duff RN  Outcome: Ongoing  1/18/2022 0303 by Ludy Preston RN  Outcome: Ongoing     Problem: Nutrition Deficits  Goal: Optimize nutritional status  1/18/2022 1121 by Angelique Duff RN  Outcome: Ongoing  1/18/2022 0303 by Ludy Preston RN  Outcome: Ongoing     Problem: Risk for Fluid Volume Deficit  Goal: Maintain normal heart rhythm  1/18/2022 1121 by Angelique Duff RN  Outcome: Ongoing  1/18/2022 0303 by Ludy Preston RN  Outcome: Ongoing  Goal: Maintain absence of muscle cramping  1/18/2022 1121 by Angelique Duff RN  Outcome: Ongoing  1/18/2022 0303 by Ludy Preston RN  Outcome: Ongoing  Goal: Maintain normal serum potassium, sodium, calcium, phosphorus, and pH  1/18/2022 1121 by Angelique Duff RN  Outcome: Ongoing  1/18/2022 0303 by Ludy Preston RN  Outcome: Ongoing     Problem: Loneliness or Risk for Loneliness  Goal: Demonstrate positive use of time alone when socialization is not possible  1/18/2022 1121 by Angelique Duff RN  Outcome: Ongoing  1/18/2022 0303 by Ludy Preston RN  Outcome: Ongoing     Problem: Fatigue  Goal: Verbalize increase energy and improved vitality  1/18/2022 1121 by Angelique Duff RN  Outcome: Ongoing  1/18/2022 0303 by Ludy Preston RN  Outcome: Ongoing     Problem: Patient Education: Go to Patient Education Activity  Goal: Patient/Family Education  1/18/2022 1121 by Angelique Duff RN  Outcome: Ongoing  1/18/2022 0303 by Ludy Preston RN  Outcome: Ongoing     Problem: Skin Integrity:  Goal: Will show no infection signs and symptoms  Description: Will show no infection signs and symptoms  1/18/2022 1121 by Angelique Duff RN  Outcome: Ongoing  1/18/2022 0303 by Ludy Preston RN  Outcome: Ongoing  Goal: Absence of new skin breakdown  Description: Absence of new skin breakdown  1/18/2022 1121 by Angelique Duff RN  Outcome: Ongoing  1/18/2022 0303 by Ludy Bologna, RN  Outcome: Ongoing     Problem: Pain:  Goal: Pain level will decrease  Description: Pain level will decrease  1/18/2022 1121 by Irvin Espinal RN  Outcome: Ongoing  1/18/2022 0303 by Luzma Moura RN  Outcome: Ongoing  Goal: Control of acute pain  Description: Control of acute pain  1/18/2022 1121 by Irvin Espinal RN  Outcome: Ongoing  1/18/2022 0303 by Luzma Moura RN  Outcome: Ongoing  Goal: Control of chronic pain  Description: Control of chronic pain  1/18/2022 1121 by Irvin Espinal RN  Outcome: Ongoing  1/18/2022 0303 by Luzam Moura RN  Outcome: Ongoing

## 2022-01-18 NOTE — PROGRESS NOTES
Hospitalist Medicine   Progress Note    Date of Service: 01/18/22  MRN: [id number]  LOS: 4    CC:  Fatigue    Summary:   Patient is a 75-year-old male without significant past medical history who presented to hospital for fevers chills for about 2 weeks.  Patient has been feeling tired fatigue and having generalized body aches for 2 weeks, he also mentions he has cough, nonproductive.  Denied chest pain nausea vomiting diarrhea constipation. INTERVAL HISTORY:     No acute events overnight. Feels ok. No significant change from yesterday. Trying to use I/S more frequently today. MEDICATIONS:   Scheduled Meds:   azithromycin  250 mg Oral Nightly    furosemide  40 mg Oral Daily    dexamethasone  10 mg IntraVENous Q24H    sodium chloride flush  10 mL IntraVENous 2 times per day    [Held by provider] enoxaparin  30 mg SubCUTAneous BID    cefTRIAXone (ROCEPHIN) IV  1,000 mg IntraVENous Q24H     Continuous Infusions:   sodium chloride      sodium chloride 25 mL (01/17/22 2201)         PHYSICAL EXAM:   BP (!) 112/57   Pulse 89   Temp 98.5 °F (36.9 °C) (Oral)   Resp 18   Ht 6' 2\" (1.88 m)   Wt 160 lb 15 oz (73 kg)   SpO2 90%   BMI 20.66 kg/m²     Physical Exam  Vitals reviewed. Constitutional:       General: He is not in acute distress. Appearance: Normal appearance. He is well-developed. He is not diaphoretic. HENT:      Head: Normocephalic and atraumatic. Mouth/Throat:      Mouth: Mucous membranes are moist.   Eyes:      Extraocular Movements: Extraocular movements intact. Pupils: Pupils are equal, round, and reactive to light. Cardiovascular:      Rate and Rhythm: Normal rate and regular rhythm. Pulses: Normal pulses. Heart sounds: Normal heart sounds. No murmur heard. No friction rub. No gallop. Pulmonary:      Effort: No respiratory distress. Breath sounds: No wheezing or rales.       Comments: Clear but diminished breath sounds  Chest:      Chest wall: No tenderness. Abdominal:      General: Bowel sounds are normal. There is no distension. Palpations: Abdomen is soft. There is no mass. Tenderness: There is no abdominal tenderness. There is no guarding. Musculoskeletal:         General: No tenderness or deformity. Cervical back: Normal range of motion and neck supple. Right lower leg: No edema. Left lower leg: No edema. Skin:     General: Skin is warm and dry. Capillary Refill: Capillary refill takes less than 2 seconds. Neurological:      General: No focal deficit present. Mental Status: He is alert and oriented to person, place, and time. Psychiatric:         Behavior: Behavior normal.             LABS / IMAGING:     Recent Labs     01/15/22  0812 01/15/22  0812 01/16/22  0931 01/16/22  1315 01/17/22  0730   WBC 10.0  --  8.2  --  7.5   HGB 7.1*  --  6.8*  --  7.7*   HCT 20.5*   < > 19.6* 19.8* 22.4*     --  228  --  159    < > = values in this interval not displayed. Recent Labs     01/15/22  0812 01/16/22  0931 01/17/22  0730    136 141   K 3.5 3.2* 3.6    97* 103   CO2 21 25 25   BUN 59* 62* 57*   CREATININE 0.8 0.8 0.7*   CALCIUM 8.6 8.4 8.0*     No results for input(s): AST, ALT, BILIDIR, BILITOT, ALKPHOS in the last 72 hours. Recent Labs     01/16/22  1315   INR 1.44*     Recent Labs     01/16/22  1315   TROPONINI 0.04*       XR CHEST 1 VIEW   Final Result   Patchy infiltrates within both lungs, most compatible with multifocal   pneumonia, especially COVID-19 pneumonia. All in all, findings are similar   when compared to the previous exam from yesterday. XR CHEST PORTABLE   Final Result   Ground-glass and interstitial opacities, indeterminate for pulmonary edema or   atypical pneumonia, including COVID. Minimal consolidation in the left base, asymmetrical airspace disease or   atelectasis.                ASSESSMENT:     Acute hypoxic respiratory failure (unchanged)  Sepsis present on admission likely secondary to   COVID-19 pneumonia (unchanged)  Acute blood loss anemia   - rule out GI bleed  Elevated troponin likely demand ischemia  Probable diastolic congestive heart failure 2/2 cor pulmonale    Remains on 15L high flow via NC. S/p Actemra. No active sign of bleeding, possibly GI. S/p 1 unit pRBC, Hgb 7.7 stable      PLAN:     - LFTs daily  - FOBT pending  - dexamethasone 10mg daily (day 5)  - lasix 40mg PO daily   - IV ceftriaxone/azithro  - mucinex 600mg BID  - encourage incentive spirometry  - continue high flow O2, keep sats > 92%  - wean O2 as tolerated  - resume low dose lovenox, monitor Hgb  - PT/OT    Discussed case yesterday evening with pt's nephew, Willis Rankin, by phone. Pt has not seen physician in many years, likely has chronic issues previously unknown. I noted pt is high risk for airway decline, but has been maintaining for several days. Nephew to encourage use of incentive spirometer. Dispo: Inpatient med/surg.     PPx:     lovenox  PT/OT:    Ordered   Code status:  Full Code         Martín Gutierrez MD  Hospitalist

## 2022-01-18 NOTE — PROGRESS NOTES
Acute respiratory failure with hypoxia ([facility] [address] and Atypical pneumonia were also pertinent to this visit. has no past medical history on file. has no past surgical history on file. Restrictions  Restrictions/Precautions  Restrictions/Precautions: Fall Risk,Isolation  Position Activity Restriction  Other position/activity restrictions: Droplet Plus : COVID +. O2 15L via High Flow. Subjective   General  Chart Reviewed: Yes  Additional Pertinent Hx: 63 y/o male admit 1/14/2022 with Acute Respiratory, Pneumonia, COVID+. Reports generalized weakness; worsening over past 2 wks (inability to get up to go to bathroom). PMH insign otherwise. Response To Previous Treatment: Patient with no complaints from previous session. Family / Caregiver Present: No  Referring Practitioner: Dr. Herb Castillo. Subjective  Subjective: Pt agreeable to PT Rx. Orientation  Orientation  Overall Orientation Status: Within Functional Limits  Cognition   Cognition  Overall Cognitive Status: Exceptions  Arousal/Alertness: Appropriate responses to stimuli  Following Commands: Follows one step commands consistently  Insights: Decreased awareness of deficits  Objective   Bed mobility  Supine to Sit: Stand by assistance  Transfers  Sit to Stand: Minimal Assistance (With Clarnce Masker. Cues for safe hand placement.)  Stand to sit: Minimal Assistance (With Clarnce Masker. Cues for safe hand placement.)  Comment: Additional Transfer with Walker following seated rest; Min assist.  Ambulation  Ambulation?: Yes  Ambulation 1  Surface: level tile  Device: Rolling Walker  Distance: Pt amb ~4-5 steps bed to chair with [employer] assist.  Weak; limited endurance. Comments: Following brief seated rest; assitional Transfer, Stand and lat wgt shift although dianna only 10-15 sec.         Exercises  Hip Flexion: 10x2  Knee Long Arc Quad: 10x2  Ankle Pumps: 10x2                     AM-PAC Score  AM-PAC Inpatient Mobility Raw Score : 15 (01/18/22 1401)  AM-PAC Inpatient T-Scale Score : 39.45 (01/18/22 1401)  Mobility Inpatient CMS 0-100% Score: 57.7 (01/18/22 1401)  Mobility Inpatient CMS G-Code Modifier : CK (01/18/22 1401)          Goals  Short term goals  Time Frame for Short term goals: Upon d/c acute care setting. Short term goal 1: Bed Mob SBA. Short term goal 2: Transfers with/without assist device CGA. Short term goal 3: Amb with/without assist device 22' CGA; sats remain at least high 80's/low 90's. Short term goal 4: Pt participating in approp Strength Exs. Patient Goals   Patient goals : Get stronger and return home. Plan    Plan  Times per week: 3-5x week while in acute care setting.   Current Treatment Recommendations: Strengthening,Functional Mobility Training,Transfer Training,Gait Training,Safety Education & Training,Patient/Caregiver Education & Training  Safety Devices  Type of devices: Call light within reach,Chair alarm in place,Left in chair,Nurse notified     Therapy Time   Individual Concurrent Group Co-treatment   Time In [address]         Time Out [address] [place] [address], PT

## 2022-01-18 NOTE — PROGRESS NOTES
Pt's O2 sat noted to be 97% on 15L. O2 decreased to 13. Pt's sat immediately dropped to 83% with no recovery. Returned to 15L. Sat currently 90%.   Electronically signed by Irvin Espinal RN on 1/18/22 at 8:19 AM EST

## 2022-01-18 NOTE — PROGRESS NOTES
Occupational Therapy  Facility/Department: [address] PROGRESSIVE CARE  Daily Treatment Note  NAME: Julissa Blanton  : 1945  MRN: [id number]    Date of Service: 2022    Discharge Recommendations:  Continue to assess pending progress,Patient would benefit from continued therapy after discharge       Assessment   Performance deficits / Impairments: Decreased functional mobility ; Decreased ADL status; Decreased balance;Decreased strength;Decreased endurance;Decreased high-level IADLs  Assessment: Patient is a 24-year-old male without significant past medical history who presented to hospital for fevers chills for about 2 weeks. Patient has been feeling tired fatigue and having generalized body aches for 2 weeks, he also mentions he has cough, nonproductive. Denied chest pain nausea vomiting diarrhea constipation. PTA pt from home with family where pt was Ind with mobility and ADLs. Pt completed limited sponge bath with SBA. He completed bed mobility with SBA. Pt on 15L O2 throughout with SpO2 remaining in the 90's. Noted oxygen sensor was falling off his finger so replaced the sensor. Pt will benefit from skilled OT services at this time. Will continue to assess for D/C needs. Prognosis: Fair;Good  OT Education: Plan of Care;OT Role;Transfer Training  REQUIRES OT FOLLOW UP: Yes  Activity Tolerance  Activity Tolerance: Patient Tolerated treatment well;Patient limited by fatigue  Safety Devices  Type of devices: Call light within reach; Bed alarm in place; Left in bed;Gait belt         Patient Diagnosis(es): The primary encounter diagnosis was COVID-19. Diagnoses of Generalized weakness, Acute respiratory failure with hypoxia ([address] and Atypical pneumonia were also pertinent to this visit. has no past medical history on file. has no past surgical history on file.     Restrictions  Restrictions/Precautions  Restrictions/Precautions: Fall Risk,Isolation  Position Activity Restriction  Other position/activity restrictions: Droplet Plus : COVID +. O2 15L via High Flow. Subjective   General  Chart Reviewed: Yes,Progress Notes  Patient assessed for rehabilitation services?: Yes  Additional Pertinent Hx: per MD note, Patient is a 40-year-old male without significant past medical history who presented to hospital for fevers chills for about 2 weeks. Patient has been feeling tired fatigue and having generalized body aches for 2 weeks, he also mentions he has cough, nonproductive. Denied chest pain nausea vomiting diarrhea constipation. Family / Caregiver Present: No  Referring Practitioner: Yuri Grimes MD  Subjective  Subjective: Pt seen bedside and agreed to OT treatment. Pt declined sitting in recliner due to television is not working. General Comment  Comments: okay for therapy per RN. Objective    ADL  Grooming: Setup (Washed face and hands with setup of the washcloth.)  UE Bathing: Setup  LE Bathing: Stand by assistance; Other (Comment) (Washed legs to his ankles. Declined removing socks to wash his feet. Declined needing to wash puneet/posteiror area at this time.)        Balance  Sitting Balance: Stand by assistance (Sat EOB for 15 minutes to complete sponge bath.)  Standing Balance: Stand by assistance  Functional Mobility  Functional Mobility Comments: Declined mobility this date.   Bed mobility  Supine to Sit: Stand by assistance  Sit to Supine: Stand by assistance  Scooting: Stand by assistance (Able to scoot self up in bed using bed rail.)                                                                    Plan   Plan  Times per week: 3-5x  Current Treatment Recommendations: Strengthening,Functional Mobility Training,Gait Training,Endurance Training,Balance Training,Safety Education & Training,Self-Care / ADL,Equipment Evaluation, Education, & procurement,Patient/Caregiver Education & Training    AM-PAC Score        AM-PAC Inpatient Daily Activity Raw Score: 16 (01/18/22 1204)  AM-PAC Inpatient ADL T-Scale Score : 35.96 (01/18/22 1204)  ADL Inpatient CMS 0-100% Score: 53.32 (01/18/22 1204)  ADL Inpatient CMS G-Code Modifier : CK (01/18/22 1204)    Goals  Short term goals  Time Frame for Short term goals: prior to D/C  Short term goal 1: complete functional mobility and transfers Mod Ind  Short term goal 2: complete bathing and dressing Mod Ind  Short term goal 3: complete toileting Mod Ind  Short term goal 4: complete grooming in stance at sink Mod Ind  Long term goals  Time Frame for Long term goals : STG=LTG  Patient Goals   Patient goals : no stated goals       Therapy Time   Individual Concurrent Group Co-treatment   Time In 1120         Time Out 1200         Minutes 40              This note to serve as OT d/c summary if pt is d/c-ed from hospital prior to next OT session.     Simin Davenport, [address]

## 2022-01-18 NOTE — PLAN OF CARE
Problem: Falls - Risk of:  Goal: Will remain free from falls  Description: Will remain free from falls  Outcome: Ongoing     Problem: Falls - Risk of:  Goal: Absence of physical injury  Description: Absence of physical injury  Outcome: Ongoing   Patient assessed for fall risk; fall precautions initiated. Patient and family instructed about safety devices. Environment kept free of clutter and adequate lighting provided. Bed locked and in lowest position. Call light within reach. Will continue to monitor.     Problem: Airway Clearance - Ineffective  Goal: Achieve or maintain patent airway  Outcome: Ongoing     Problem: Gas Exchange - Impaired  Goal: Absence of hypoxia  Outcome: Ongoing     Problem: Gas Exchange - Impaired  Goal: Promote optimal lung function  Outcome: Ongoing     Problem: Breathing Pattern - Ineffective  Goal: Ability to achieve and maintain a regular respiratory rate  Outcome: Ongoing

## 2022-01-19 NOTE — PROGRESS NOTES
Physical Therapy  Facility/Department: [address] PROGRESSIVE CARE  Daily Treatment Note  NAME: Alyson Garcia  : 1945  MRN: [id number]    Date of Service: 2022    Discharge Recommendations:  Continue to assess pending progress   PT Equipment Recommendations  Other: Will monitor for potential equipt needs. Alyson Garcia scored a 15/24 on the AM-PAC short mobility form. Current research shows that an AM-PAC score of 17 or less is typically not associated with a discharge to the patient's home setting. Based on the patient's AM-PAC score and their current functional mobility deficits, it is recommended that the patient have 3-5 sessions per week of Physical Therapy at d/c to increase the patient's independence. Please see assessment section for further patient specific details. If patient discharges prior to next session this note will serve as a discharge summary. Please see below for the latest assessment towards goals. Assessment   Body structures, Functions, Activity limitations: Decreased functional mobility ; Decreased strength;Decreased endurance;Decreased balance  Assessment: 63 y/o male admit 2022 with Acute Respiratory, Pneumonia, COVID+. Reports generalized weakness; worsening over past 2 wks (inability to get up to go to bathroom). PMH insign otherwise. PTA pt living with family (both work) in apt setting with steps to enter/access 1st floor apt; independent daily care and functional mobility prior. Pt currently requiring O2 15L; does desat briefly 83-84% (2 very brief episodes)  following functional activities although quickly recovers. Pt dianna Transfer bed to chair with Belvie Sake assist.  Very limited endurance. Anticipate need cont PT (3-5) upon d/c. Will monitor pt's progress;  Prognosis: Good;Fair  Decision Making: Medium Complexity  History: 63 y/o male admit 2022 with Acute Respiratory, Pneumonia, COVID+.    Reports generalized weakness; worsening over past 2 wks (inability to get up to go to bathroom). PMH insign otherwise. Exam: See above. Clinical Presentation: See above. Patient Education: Role of PT, POC, Need to call for assist, Safe use of Walker. Barriers to Learning: None. REQUIRES PT FOLLOW UP: Yes  Activity Tolerance  Activity Tolerance: Patient limited by endurance  Activity Tolerance: Pt currently requiring O2 15L; does desat briefly 83-84% (2 very brief episodes)  following functional activities although quickly recovers. Pt dianna Transfer bed to chair with Francisco Javier Cross assist.  Very limited endurance. Patient Diagnosis(es): The primary encounter diagnosis was COVID-19. Diagnoses of Generalized weakness, Acute respiratory failure with hypoxia ([facility] [address] and Atypical pneumonia were also pertinent to this visit. has no past medical history on file. has no past surgical history on file. Restrictions  Restrictions/Precautions  Restrictions/Precautions: Fall Risk,Isolation  Position Activity Restriction  Other position/activity restrictions: Droplet Plus : COVID +. O2 15L via High Flow. Subjective   General  Chart Reviewed: Yes  Additional Pertinent Hx: 63 y/o male admit 1/14/2022 with Acute Respiratory, Pneumonia, COVID+. Reports generalized weakness; worsening over past 2 wks (inability to get up to go to bathroom). PMH insign otherwise. Response To Previous Treatment: Patient with no complaints from previous session. Family / Caregiver Present: No  Referring Practitioner: Dr. Vishal Bran. Subjective  Subjective: Pt agreeable to PT Rx. Orientation  Orientation  Overall Orientation Status: Within Functional Limits  Cognition   Cognition  Overall Cognitive Status: Exceptions  Arousal/Alertness: Appropriate responses to stimuli  Following Commands: Follows one step commands consistently  Insights: Decreased awareness of deficits  Objective   Bed mobility  Supine to Sit: Stand by assistance (HOB elevated.   Use of bedrail.)  Transfers  Sit to Stand: Minimal Assistance (With Adron Waddell. Cues for safe hand placement.)  Stand to sit: Minimal Assistance (With Adron Waddell. Cues for safe hand placement.)  Comment: Additional Transfer with Walker following seated rest; Min assist.  Ambulation  Ambulation?: Yes  Ambulation 1  Surface: level tile  Device: Rolling Walker  Distance: Pt amb ~4-5 steps bed to chair with [employer] assist.  Weak; limited endurance. Comments: Following brief seated rest; assitional Transfer, able to amb in place 4-5 steps. Very limited endurance. AM-PAC Score  AM-PAC Inpatient Mobility Raw Score : 15 (01/19/22 1526)  AM-PAC Inpatient T-Scale Score : 39.45 (01/19/22 1526)  Mobility Inpatient CMS 0-100% Score: 57.7 (01/19/22 1526)  Mobility Inpatient CMS G-Code Modifier : CK (01/19/22 1526)          Goals  Short term goals  Time Frame for Short term goals: Upon d/c acute care setting. Short term goal 1: Bed Mob SBA. Short term goal 2: Transfers with/without assist device CGA. Short term goal 3: Amb with/without assist device 22' CGA; sats remain at least high 80's/low 90's. Short term goal 4: Pt participating in approp Strength Exs. Patient Goals   Patient goals : Get stronger and return home. Plan    Plan  Times per week: 3-5x week while in acute care setting.   Current Treatment Recommendations: Strengthening,Functional Mobility Training,Transfer Training,Gait Training,Safety Education & Training,Patient/Caregiver Education & Training  Safety Devices  Type of devices: Call light within reach,Chair alarm in place,Left in chair,Nurse notified     Therapy Time   Individual Concurrent Group Co-treatment   Time In [facility]         Time Out 1500         Minutes [address], PT

## 2022-01-19 NOTE — PROGRESS NOTES
Hospitalist Progress Note      PCP: No primary care provider on file. Date of Admission: 1/14/2022    Chief Complaint: SOB/ fatigue. Hospital Course:    75-year-old male without significant past medical history who presented to hospital for fevers chills for about 2 weeks.  Patient has been feeling tired fatigue and having generalized body aches for 2 weeks, he also mentions he has cough, nonproductive.  Denied chest pain nausea vomiting diarrhea constipation. Admitted for COVID pneumonia. Subjective:   Denies any new complaints at the time being, reports old lingering right arm tingling and numbness. Continues to be on 15 L of oxygen via nasal cannula      Medications:  Reviewed    Infusion Medications    sodium chloride      sodium chloride 25 mL (01/18/22 2103)     Scheduled Medications    enoxaparin  30 mg SubCUTAneous Nightly    azithromycin  250 mg Oral Nightly    furosemide  40 mg Oral Daily    dexamethasone  10 mg IntraVENous Q24H    sodium chloride flush  10 mL IntraVENous 2 times per day    cefTRIAXone (ROCEPHIN) IV  1,000 mg IntraVENous Q24H     PRN Meds: sodium chloride, sodium chloride flush, sodium chloride, potassium chloride **OR** potassium alternative oral replacement **OR** potassium chloride, potassium chloride, magnesium sulfate, promethazine **OR** ondansetron, magnesium hydroxide, acetaminophen **OR** acetaminophen      Intake/Output Summary (Last 24 hours) at 1/19/2022 1245  Last data filed at 1/19/2022 0131  Gross per 24 hour   Intake --   Output 800 ml   Net -800 ml       Physical Exam Performed:    BP (!) 113/57   Pulse 86   Temp 97.7 °F (36.5 °C) (Oral)   Resp 18   Ht 6' 2\" (1.88 m)   Wt 154 lb 5.2 oz (70 kg)   SpO2 90%   BMI 19.81 kg/m²     General appearance: No apparent distress, appears stated age and cooperative. on 15 liters. HEENT: Pupils equal, round, and reactive to light. Conjunctivae/corneas clear. Neck: Supple, with full range of motion.  No jugular venous distention. Trachea midline. Respiratory:  Normal respiratory effort. Clear to auscultation, bilaterally without Rales/Wheezes/Rhonchi. Cardiovascular: Regular rate and rhythm with normal S1/S2 without murmurs, rubs or gallops. Abdomen: Soft, non-tender, non-distended with normal bowel sounds. Musculoskeletal: No clubbing, cyanosis or edema bilaterally. Full range of motion without deformity. Skin: Skin color, texture, turgor normal.  No rashes or lesions. Neurologic:  Neurovascularly intact without any focal sensory/motor deficits. Cranial nerves: II-XII intact, grossly non-focal.sensation intact. Patient reports some tingling in the right hand. Psychiatric: Alert and oriented, thought content appropriate, normal insight  Capillary Refill: Brisk,3 seconds, normal   Peripheral Pulses: +2 palpable, equal bilaterally       Labs:   Recent Labs     01/17/22  0730 01/18/22  0641 01/19/22  0716   WBC 7.5 8.4 10.0   HGB 7.7* 7.7* 7.4*   HCT 22.4* 22.7* 21.4*    136 126*     Recent Labs     01/17/22  0730 01/18/22  0641 01/19/22  0716    141 136   K 3.6 4.0 3.8    103 99   CO2 25 26 28   BUN 57* 49* 41*   CREATININE 0.7* 0.6* 0.6*   CALCIUM 8.0* 8.1* 7.8*     Recent Labs     01/18/22  0641 01/19/22  0716   AST 30 30   ALT 21 28   BILIDIR 0.4* 0.3   BILITOT 0.7 0.6   ALKPHOS 102 101     Recent Labs     01/16/22  1315   INR 1.44*     Recent Labs     01/16/22  1315   TROPONINI 0.04*       Urinalysis:      Lab Results   Component Value Date    NITRU Negative 01/17/2022    WBCUA 0 01/17/2022    RBCUA 1 01/17/2022    BLOODU Negative 01/17/2022    SPECGRAV 1.013 01/17/2022    GLUCOSEU Negative 01/17/2022       Radiology:  XR CHEST 1 VIEW   Final Result   Patchy infiltrates within both lungs, most compatible with multifocal   pneumonia, especially COVID-19 pneumonia. All in all, findings are similar   when compared to the previous exam from yesterday.          XR CHEST PORTABLE   Final Result   Ground-glass and interstitial opacities, indeterminate for pulmonary edema or   atypical pneumonia, including COVID. Minimal consolidation in the left base, asymmetrical airspace disease or   atelectasis. Assessment/Plan:    Active Hospital Problems    Diagnosis     Hypoxia [R09.02]      Acute hypoxic respiratory failure. Covid 19 Pneumonia. Sepsis. Patient was admitted with COVID-pneumonia, continues to be on 15 L via nasal cannula. S/p Actemra 1/16. Procalcitonin 0.98 1/14, was subsequently started on antibiotics. Plan.  -Wean down oxygen as tolerated. -Continue ceftriaxone D5/azithromycin D4.   -Repeat procalcitonin tomorrow.  -Incentive spirometry  -Continue on dexamethasone 10 mg. Acute blood loss anemia. No active signs of bleeding, received 1 unit of PRBC. Hb today is 7.4. Plan.  -Continue to monitor hemoglobin. -occult blood    Right upper limb numbness. Seems positional, none dermatomal with no decrease in sensation. Will continue to follow symptoms. Elevated troponin likely demand ischemia  Resolved. DVT Prophylaxis: lovenox  Diet: ADULT DIET;  Regular  Code Status: Full Code    PT/OT Eval Status: following    Dispo - pending clinical improvement      Juan Fallon MD

## 2022-01-19 NOTE — PLAN OF CARE
Problem: Falls - Risk of:  Goal: Will remain free from falls  Description: Will remain free from falls  1/19/2022 1007 by Efrain Spaulding RN  Outcome: Ongoing  1/19/2022 0108 by Leonardo Blankenship RN  Outcome: Ongoing  Goal: Absence of physical injury  Description: Absence of physical injury  1/19/2022 1007 by Efrain Spaulding RN  Outcome: Ongoing  1/19/2022 0108 by Leonardo Blankenship RN  Outcome: Ongoing     Problem: Airway Clearance - Ineffective  Goal: Achieve or maintain patent airway  1/19/2022 1007 by Efrain Spaulding RN  Outcome: Ongoing  1/19/2022 0108 by Leonardo Blankenship RN  Outcome: Ongoing     Problem: Gas Exchange - Impaired  Goal: Absence of hypoxia  1/19/2022 1007 by Efrain Spaulding RN  Outcome: Ongoing  1/19/2022 0108 by Leonardo Blankenship RN  Outcome: Ongoing  Goal: Promote optimal lung function  1/19/2022 1007 by Efrain Spaulding RN  Outcome: Ongoing  1/19/2022 0108 by Leonardo Blankenship RN  Outcome: Ongoing     Problem: Breathing Pattern - Ineffective  Goal: Ability to achieve and maintain a regular respiratory rate  1/19/2022 1007 by Efrain Spaudling RN  Outcome: Ongoing  1/19/2022 0108 by Leonardo Blankenship RN  Outcome: Ongoing     Problem:  Body Temperature -  Risk of, Imbalanced  Goal: Ability to maintain a body temperature within defined limits  1/19/2022 1007 by Efrain Spaulding RN  Outcome: Ongoing  1/19/2022 0108 by Leonardo Blankenship RN  Outcome: Ongoing  Goal: Will regain or maintain usual level of consciousness  1/19/2022 1007 by Efrain Spaulding RN  Outcome: Ongoing  1/19/2022 0108 by Leonardo Blankenship RN  Outcome: Ongoing  Goal: Complications related to the disease process, condition or treatment will be avoided or minimized  1/19/2022 1007 by Efrain Spaulding RN  Outcome: Ongoing  1/19/2022 0108 by Leonardo Blankenship RN  Outcome: Ongoing     Problem: Isolation Precautions - Risk of Spread of Infection  Goal: Prevent transmission of infection  1/19/2022 1007 by Efrain Spaulding RN  Outcome: Ongoing  1/19/2022 0108 by Luzma Moura RN  Outcome: Ongoing     Problem: Nutrition Deficits  Goal: Optimize nutritional status  1/19/2022 1007 by Aura Bloch, RN  Outcome: Ongoing  1/19/2022 0108 by Luzma Moura RN  Outcome: Ongoing     Problem: Risk for Fluid Volume Deficit  Goal: Maintain normal heart rhythm  Outcome: Ongoing  Goal: Maintain absence of muscle cramping  Outcome: Ongoing  Goal: Maintain normal serum potassium, sodium, calcium, phosphorus, and pH  Outcome: Ongoing     Problem: Loneliness or Risk for Loneliness  Goal: Demonstrate positive use of time alone when socialization is not possible  Outcome: Ongoing     Problem: Fatigue  Goal: Verbalize increase energy and improved vitality  Outcome: Ongoing     Problem: Patient Education: Go to Patient Education Activity  Goal: Patient/Family Education  Outcome: Ongoing     Problem: Skin Integrity:  Goal: Will show no infection signs and symptoms  Description: Will show no infection signs and symptoms  Outcome: Ongoing  Goal: Absence of new skin breakdown  Description: Absence of new skin breakdown  Outcome: Ongoing     Problem: Pain:  Goal: Pain level will decrease  Description: Pain level will decrease  Outcome: Ongoing  Goal: Control of acute pain  Description: Control of acute pain  Outcome: Ongoing  Goal: Control of chronic pain  Description: Control of chronic pain  Outcome: Ongoing

## 2022-01-19 NOTE — PLAN OF CARE
Problem: Falls - Risk of:  Goal: Will remain free from falls  Description: Will remain free from falls  1/19/2022 0108 by Nadia Huynh RN  Outcome: Ongoing   Patient assessed for fall risk; fall precautions initiated. Patient and family instructed about safety devices. Environment kept free of clutter and adequate lighting provided. Bed locked and in lowest position. Call light within reach. Will continue to monitor. Problem: Falls - Risk of:  Goal: Absence of physical injury  Description: Absence of physical injury  1/19/2022 0108 by Nadia Huynh RN  Outcome: Ongoing     Problem: Airway Clearance - Ineffective  Goal: Achieve or maintain patent airway  1/19/2022 0108 by Nadia Huynh RN  Outcome: Ongoing     Problem: Gas Exchange - Impaired  Goal: Absence of hypoxia  1/19/2022 0108 by Nadia Huynh RN  Outcome: Ongoing     Problem: Gas Exchange - Impaired  Goal: Promote optimal lung function  1/19/2022 0108 by Nadia Huynh RN  Outcome: Ongoing     Problem: Breathing Pattern - Ineffective  Goal: Ability to achieve and maintain a regular respiratory rate  1/19/2022 0108 by Nadia Huynh RN  Outcome: Ongoing     Problem: Body Temperature -  Risk of, Imbalanced  Goal: Ability to maintain a body temperature within defined limits  1/19/2022 0108 by Nadia Huynh RN  Outcome: Ongoing     Problem: Body Temperature -  Risk of, Imbalanced  Goal: Will regain or maintain usual level of consciousness  1/19/2022 0108 by Nadia Huynh RN  Outcome: Ongoing     Problem:  Body Temperature -  Risk of, Imbalanced  Goal: Complications related to the disease process, condition or treatment will be avoided or minimized  1/19/2022 0108 by Nadia Huynh RN  Outcome: Ongoing     Problem: Isolation Precautions - Risk of Spread of Infection  Goal: Prevent transmission of infection  1/19/2022 0108 by Nadia Huynh RN  Outcome: Ongoing     Problem: Nutrition Deficits  Goal: Optimize nutritional status  1/19/2022 0108 by Chad Bailey RN  Outcome: Ongoing

## 2022-01-19 NOTE — CARE COORDINATION
Discharge Planning:   Called to patient's room to discuss discharge plan. Patient did not answer at this time. Per chart review:   PT/OT recommending:  PTA pt living with family (both work) in apt setting with steps to enter/access 1st floor apt; independent daily care and functional mobility prior. Pt currently requiring O2 15L; does desat briefly mid 80's following functional activities although quickly recovers. Pt dianna Transfer bed to chair with [employer] assist.  Very limited endurance. Will monitor pt's progress; possible need cont PT (3-5) upon d/c. Skilled nursing facility for physical rehab at discharge. Patient currently requiring 15 LO2.      NEED SNF list and COVID SNF list   CHANDRIKA Plunkett, ZEKE, Social Work/Case Management   [phone]  Electronically signed by CHANDRIKA Plunkett, ZEKE on 1/19/2022 at 11:30 AM

## 2022-01-19 NOTE — PROGRESS NOTES
Occupational Therapy  Facility/Department: [address] PROGRESSIVE CARE  Daily Treatment Note  NAME: Dhruv Barraza  : 1945  MRN: [id number]    Date of Service: 2022    Discharge Recommendations:  Continue to assess pending progress,Patient would benefit from continued therapy after discharge,3-5 sessions per week  OT Equipment Recommendations  Other: continue to assess    Dhruv Barraza scored a 16/24 on the AM-PAC ADL Inpatient form. Current research shows that an AM-PAC score of 17 or less is typically not associated with a discharge to the patient's home setting. Based on the patient's AM-PAC score and their current ADL deficits, it is recommended that the patient have 3-5 sessions per week of Occupational Therapy at d/c to increase the patient's independence. Please see assessment section for further patient specific details. If patient discharges prior to next session this note will serve as a discharge summary. Please see below for the latest assessment towards goals. Assessment   Performance deficits / Impairments: Decreased functional mobility ; Decreased ADL status; Decreased balance;Decreased strength;Decreased endurance;Decreased high-level IADLs  Assessment: Pt tolerated session well this date but continues to be limited by decreased endurance and fatigue. Pt performed bed mob with CGA and fxl mob/transfers with RW min A + verbal cues for safety. Pt continues to be on 15L O2 throughout with brief desats after activity to 83% however returns to 88-90% with seated rest break and cues for breathing technique. Pt continues to be functioning below baseline and would benefit from ongoing skilled OT 3-5x/wk in order to maximize safety and independence prior to returning home. Will continue to assess progress and follow during acute hospitalization.   OT Education: Transfer Training;OT Role;Energy Conservation  REQUIRES OT FOLLOW UP: Yes  Activity Tolerance  Activity Tolerance: Patient Tolerated treatment well;Patient limited by fatigue  Safety Devices  Type of devices: Call light within reach;Nurse notified; Chair alarm in place; Left in chair         Patient Diagnosis(es): The primary encounter diagnosis was COVID-19. Diagnoses of Generalized weakness, Acute respiratory failure with hypoxia ([address] and Atypical pneumonia were also pertinent to this visit. has no past medical history on file. has no past surgical history on file. Restrictions  Restrictions/Precautions  Restrictions/Precautions: Fall Risk,Isolation  Position Activity Restriction  Other position/activity restrictions: Droplet Plus : COVID +. O2 15L via High Flow. Subjective   General  Chart Reviewed: Yes,Progress Notes,Orders  Patient assessed for rehabilitation services?: Yes  Additional Pertinent Hx: per MD note, Patient is a 58-year-old male without significant past medical history who presented to hospital for fevers chills for about 2 weeks. Patient has been feeling tired fatigue and having generalized body aches for 2 weeks, he also mentions he has cough, nonproductive. Denied chest pain nausea vomiting diarrhea constipation. Family / Caregiver Present: No  Referring Practitioner: Sukhwinder Almendarez MD  Subjective  Subjective: Pt seen bedside, agreeable to OT/PT follow-up. Pt agreeable to getting up to recliner  General Comment  Comments: okay for therapy per RN. Orientation  Orientation  Overall Orientation Status: Within Functional Limits     Objective    ADL  Feeding: Setup  Toileting: Dependent/Total ([place])        Balance  Sitting Balance: Stand by assistance  Standing Balance: Stand by assistance  Functional Mobility  Functional - Mobility Device: Rolling Walker  Assist Level: Minimal assistance  Functional Mobility Comments: Pt performed bed > recliner ambulation with RW min A.  Pt briefly desat after ambulation to 83% however returned to high 80s with seated rest break and cues for breathing technique  Bed mobility  Supine to Sit: Stand by assistance (HOB raised)  Sit to Supine: Unable to assess (Pt in recliner at end of session)  Transfers  Sit to stand: Minimal assistance  Stand to sit: Minimal assistance  Transfer Comments: to/from RW; cues for hand placement; completed x2 reps; Pt performed 5 marchs in stance with CGA- desats to low 80s but returned to high 88-90% with seated rest break and cues for breathing technique     Cognition  Overall Cognitive Status: Exceptions  Arousal/Alertness: Appropriate responses to stimuli  Following Commands:  Follows one step commands consistently  Insights: Decreased awareness of deficits    Plan   Plan  Times per week: 3-5x  Current Treatment Recommendations: Strengthening,Functional Mobility Training,Gait Training,Endurance Training,Balance Training,Safety Education & Training,Self-Care / ADL,Equipment Evaluation, Education, & procurement,Patient/Caregiver Education & Training    AM-PAC Score        AM-[facility] Inpatient Daily Activity Raw Score: 16 (01/19/22 1452)  -PAC Inpatient ADL T-Scale Score : 35.96 (01/19/22 1452)  ADL Inpatient CMS 0-100% Score: 53.32 (01/19/22 1452)  ADL Inpatient CMS G-Code Modifier : CK (01/19/22 1452)    Goals  Short term goals  Time Frame for Short term goals: prior to D/C  Short term goal 1: complete functional mobility and transfers Mod Ind  Short term goal 2: complete bathing and dressing Mod Ind  Short term goal 3: complete toileting Mod Ind  Short term goal 4: complete grooming in stance at sink [address] term goals  Time Frame for Long term goals : STG=LTG  Patient Goals   Patient goals : no stated goals       Therapy Time   Individual Concurrent Group Co-treatment   Time In 1430         Time Out 1500         Minutes Heber Rich OT   Electronically signed by Valarie Jc OTR/L  License # [id number]

## 2022-01-20 NOTE — PROGRESS NOTES
Occupational Therapy  Facility/Department: [address] PROGRESSIVE CARE  Daily Treatment Note  NAME: Vannessa Mayer  : 1945  MRN: [id number]    Date of Service: 2022    Discharge Recommendations:  Continue to assess pending progress,Patient would benefit from continued therapy after discharge,3-5 sessions per week       Assessment   Performance deficits / Impairments: Decreased functional mobility ; Decreased ADL status; Decreased balance;Decreased strength;Decreased endurance;Decreased high-level IADLs  Assessment: Pt on 20L via Airvo. He completed bed mobility with SBA with HOB elevated. He completed transfer to the recliner using RW with CGA. Oxygen sats dropped to upper 80's but recovered quickly with seated rest break. He was able to complete UE exercise with oxygen sats remaining in the 90's. Anticipte pt will require skiled OT at low/moderate pace after discharge due to decreased endurance. Prognosis: Fair;Good  REQUIRES OT FOLLOW UP: Yes  Activity Tolerance  Activity Tolerance: Patient limited by fatigue  Activity Tolerance: Pt's oxygen sats dropped to upper 80's but quickly recovered with rest break. Safety Devices  Type of devices: Call light within reach; Chair alarm in place; Left in chair;Gait belt         Patient Diagnosis(es): The primary encounter diagnosis was COVID-19. Diagnoses of Generalized weakness, Acute respiratory failure with hypoxia ([address] and Atypical pneumonia were also pertinent to this visit. has no past medical history on file. has no past surgical history on file. Restrictions  Restrictions/Precautions  Restrictions/Precautions: Fall Risk,Isolation  Position Activity Restriction  Other position/activity restrictions: Droplet Plus : COVID +. O2 20L via Airvo.   Subjective   General  Chart Reviewed: Yes,Progress Notes  Patient assessed for rehabilitation services?: Yes  Additional Pertinent Hx: per MD note, Patient is a 75-year-old male without significant past medical history who presented to hospital for fevers chills for about 2 weeks. Patient has been feeling tired fatigue and having generalized body aches for 2 weeks, he also mentions he has cough, nonproductive. Denied chest pain nausea vomiting diarrhea constipation. Family / Caregiver Present: No  Referring Practitioner: Reena Rosenberg MD  Subjective  Subjective: Pt seen bedside and agreed to sitting in the recliner. Pt reports SOB with any activity. General Comment  Comments: okay for therapy per RN. Objective    ADL  LE Dressing: Dependent/Total  Toileting:  (Male external catheter.)        Balance  Sitting Balance: Stand by assistance  Standing Balance: Stand by assistance  Functional Mobility  Functional - Mobility Device: Rolling Walker  Activity: Other (several steps to the recliner)  Assist Level: Contact guard assistance  Wheelchair Bed Transfers  Wheelchair/Bed - Technique: Ambulating  Equipment Used:  Other (recliner)  Level of Asssistance: Contact guard assistance  Bed mobility  Supine to Sit: Stand by assistance (HOB elevated)  Sit to Supine: Unable to assess (Pt up in the recliner at the end of the session.)  Transfers  Sit to stand: Contact guard assistance  Stand to sit: Contact guard assistance                                            Type of ROM/Therapeutic Exercise  Type of ROM/Therapeutic Exercise: AROM  Exercises  Shoulder Flexion: x 10  Shoulder Extension: x 10  Horizontal ABduction: x 10  Horizontal ADduction: x 10  Elbow Flexion: x 10  Elbow Extension: x 10                    Plan   Plan  Times per week: 3-5x  Current Treatment Recommendations: Strengthening,Functional Mobility Training,Gait Training,Endurance Training,Balance Training,Safety Education & Training,Self-Care / ADL,Equipment Evaluation, Education, & procurement,Patient/Caregiver Education & Training    AM-PAC Score        AM-PAC Inpatient Daily Activity Raw Score: 16 (01/18/22 1204)  AM-PAC Inpatient ADL T-Scale Score : 35.96 (01/18/22 1204)  ADL Inpatient CMS 0-100% Score: 53.32 (01/18/22 1204)  ADL Inpatient CMS G-Code Modifier : CK (01/18/22 1204)    Goals  Short term goals  Time Frame for Short term goals: prior to D/C  Short term goal 1: complete functional mobility and transfers Mod Ind  Short term goal 2: complete bathing and dressing Mod Ind  Short term goal 3: complete toileting Mod Ind  Short term goal 4: complete grooming in stance at sink Mod Ind  Long term goals  Time Frame for Long term goals : STG=LTG  Patient Goals   Patient goals : no stated goals       Therapy Time   Individual Concurrent Group Co-treatment   Time In 0910         Time Out 0945         Minutes 35              This note to serve as OT d/c summary if pt is d/c-ed from hospital prior to next OT session.     Yaniv Serrano, [address]

## 2022-01-20 NOTE — PROGRESS NOTES
Hospitalist Progress Note      PCP: No primary care provider on file. Date of Admission: 1/14/2022    Chief Complaint: SOB/ fatigue. Hospital Course:    52-year-old male without significant past medical history who presented to hospital for fevers chills for about 2 weeks.  Patient has been feeling tired fatigue and having generalized body aches for 2 weeks, he also mentions he has cough, nonproductive.  Denied chest pain nausea vomiting diarrhea constipation. Admitted for COVID pneumonia. Subjective:   Patient denies any new complaints. On HFNC 20 liters at 90%. Medications:  Reviewed    Infusion Medications    sodium chloride      sodium chloride 25 mL (01/18/22 2103)     Scheduled Medications    cefTRIAXone (ROCEPHIN) IV  1,000 mg IntraVENous Q24H    enoxaparin  30 mg SubCUTAneous Nightly    furosemide  40 mg Oral Daily    dexamethasone  10 mg IntraVENous Q24H    sodium chloride flush  10 mL IntraVENous 2 times per day     PRN Meds: benzocaine-menthol, sodium chloride, sodium chloride flush, sodium chloride, potassium chloride **OR** potassium alternative oral replacement **OR** potassium chloride, potassium chloride, magnesium sulfate, promethazine **OR** ondansetron, magnesium hydroxide, acetaminophen **OR** acetaminophen    No intake or output data in the 24 hours ending 01/20/22 0835    Physical Exam Performed:    BP (!) 120/56   Pulse 96   Temp 98.1 °F (36.7 °C) (Oral)   Resp 18   Ht 6' 2\" (1.88 m)   Wt 154 lb 1.6 oz (69.9 kg)   SpO2 (!) 88%   BMI 19.79 kg/m²     General appearance: No apparent distress, appears stated age and cooperative. on HFNC. HEENT: Pupils equal, round, and reactive to light. Conjunctivae/corneas clear. Neck: Supple, with full range of motion. No jugular venous distention. Trachea midline. Respiratory:  Normal respiratory effort. Clear to auscultation, bilaterally without Rales/Wheezes/Rhonchi.   Cardiovascular: Regular rate and rhythm with normal S1/S2 without murmurs, rubs or gallops. Abdomen: Soft, non-tender, non-distended with normal bowel sounds. Musculoskeletal: No clubbing, cyanosis or edema bilaterally. Full range of motion without deformity. Skin: Skin color, texture, turgor normal.  No rashes or lesions. Neurologic:  Neurovascularly intact without any focal sensory/motor deficits. Cranial nerves: II-XII intact, grossly non-focal.sensation intact. Patient reports some tingling in the right hand. Psychiatric: Alert and oriented, thought content appropriate, normal insight  Capillary Refill: Brisk,3 seconds, normal   Peripheral Pulses: +2 palpable, equal bilaterally       Labs:   Recent Labs     01/18/22  0641 01/19/22  0716 01/20/22  0500   WBC 8.4 10.0 13.1*   HGB 7.7* 7.4* 7.3*   HCT 22.7* 21.4* 21.6*    126* 132*     Recent Labs     01/18/22  0641 01/19/22  0716 01/20/22  0500    136 139   K 4.0 3.8 4.4    99 101   CO2 26 28 26   BUN 49* 41* 37*   CREATININE 0.6* 0.6* 0.6*   CALCIUM 8.1* 7.8* 7.9*     Recent Labs     01/18/22  0641 01/19/22  0716 01/20/22  0500   AST 30 30 17   ALT 21 28 24   BILIDIR 0.4* 0.3 <0.2   BILITOT 0.7 0.6 0.5   ALKPHOS 102 101 105     No results for input(s): INR in the last 72 hours. No results for input(s): Myrla Greulich in the last 72 hours. Urinalysis:      Lab Results   Component Value Date    NITRU Negative 01/17/2022    WBCUA 0 01/17/2022    RBCUA 1 01/17/2022    BLOODU Negative 01/17/2022    SPECGRAV 1.013 01/17/2022    GLUCOSEU Negative 01/17/2022       Radiology:  XR CHEST 1 VIEW   Final Result   Patchy infiltrates within both lungs, most compatible with multifocal   pneumonia, especially COVID-19 pneumonia. All in all, findings are similar   when compared to the previous exam from yesterday. XR CHEST PORTABLE   Final Result   Ground-glass and interstitial opacities, indeterminate for pulmonary edema or   atypical pneumonia, including COVID.       Minimal

## 2022-01-21 NOTE — PROGRESS NOTES
Hospitalist Progress Note      PCP: No primary care provider on file. Date of Admission: 1/14/2022    Chief Complaint: SOB/ fatigue. Hospital Course:    76-year-old male without significant past medical history who presented to hospital for fevers chills for about 2 weeks.  Patient has been feeling tired fatigue and having generalized body aches for 2 weeks, he also mentions he has cough, nonproductive.  Denied chest pain nausea vomiting diarrhea constipation. Admitted for COVID pneumonia. 1/20 worsening oxygen requirements, placed on vapotherm. Subjective:   Patient stated that he feels more short of breath today. currently on 25 liters, 90%. Medications:  Reviewed    Infusion Medications    sodium chloride      sodium chloride 25 mL (01/18/22 2103)     Scheduled Medications    enoxaparin  30 mg SubCUTAneous Nightly    furosemide  40 mg Oral Daily    dexamethasone  10 mg IntraVENous Q24H    sodium chloride flush  10 mL IntraVENous 2 times per day     PRN Meds: benzocaine-menthol, sodium chloride, sodium chloride flush, sodium chloride, potassium chloride **OR** potassium alternative oral replacement **OR** potassium chloride, potassium chloride, magnesium sulfate, promethazine **OR** ondansetron, magnesium hydroxide, acetaminophen **OR** acetaminophen      Intake/Output Summary (Last 24 hours) at 1/21/2022 1308  Last data filed at 1/20/2022 1559  Gross per 24 hour   Intake --   Output 800 ml   Net -800 ml       Physical Exam Performed:    BP (!) 115/54   Pulse 92   Temp 97.6 °F (36.4 °C) (Oral)   Resp 24   Ht 6' 2\" (1.88 m)   Wt 150 lb 9.2 oz (68.3 kg)   SpO2 99%   BMI 19.33 kg/m²     General appearance: No apparent distress, appears stated age and cooperative. on HFNC. HEENT: Pupils equal, round, and reactive to light. Conjunctivae/corneas clear. Neck: Supple, with full range of motion. No jugular venous distention. Trachea midline.   Respiratory:  Normal respiratory and interstitial opacities, indeterminate for pulmonary edema or   atypical pneumonia, including COVID. Minimal consolidation in the left base, asymmetrical airspace disease or   atelectasis. Assessment/Plan:    Active Hospital Problems    Diagnosis     Hypoxia [R09.02]      Acute hypoxic respiratory failure. Covid 19 Pneumonia. Sepsis. Patient was admitted with COVID-pneumonia, continues to be on 15 L via nasal cannula. S/p Actemra 1/16. Procalcitonin 0.98--> 0.24 ( completed course of ceftriaxone /azithromycin). worsening oxygen requirements, started on HFNC 1/20   Plan.  -Wean down oxygen as tolerated. -Incentive spirometry  -Continue on dexamethasone 10 mg.   - repeat CXR    Acute blood loss anemia. No active signs of bleeding, received 1 unit of PRBC. Hb today is 7.6  Occult blood negative   Plan.  -Continue to monitor hemoglobin. Right upper limb numbness. Seems positional, none dermatomal with no decrease in sensation. Will continue to follow symptoms. Elevated troponin likely demand ischemia  Resolved. Social.   Patient continues to be full code with no limitations in care     DVT Prophylaxis: lovenox  Diet: ADULT DIET;  Regular  Code Status: Full Code    PT/OT Eval Status: following    Dispo - pending clinical improvement      Tomas Arndt MD

## 2022-01-21 NOTE — PROGRESS NOTES
RD did not conduct direct, in-person nutrition evaluation in efforts to reduce exposure and use of PPE for high risk persons, PUI persons, patients who have tested positive for Covid-19 or those awaiting respiratory panel results. EMR was screened for nutrition risk factors, as defined per nutrition standards of care. Comprehensive Nutrition Assessment    Type and Reason for Visit:  Initial    Nutrition Recommendations/Plan:   Regular diet   Ensure TID  Will monitor nutritional adequacy, nutrition-related labs, weights, BMs, and clinical progress     Nutrition Assessment:  LOS. Pt admitted with COVID pneumonia. In isolation, on 20L via vapotherm. Pt on Regular diet, intakes varying. Per nsg screen, pt did report poor appetite on admission. Wts variable this admission, hard to assess trends. Will trial ONS. Malnutrition Assessment:  Malnutrition Status:  Insufficient data      Estimated Daily Nutrient Needs:  Energy (kcal):  4289-8080 kcal (28-30 kcal/kg ABW); Weight Used for Energy Requirements:  Current     Protein (g):  68-82 gm (1-1.2 gm/kg ABW); Weight Used for Protein Requirements:           Fluid (ml/day):   ; Method Used for Fluid Requirements:  1 ml/kcal      Nutrition Related Findings:  no edema      Wounds:  None       Current Nutrition Therapies:    ADULT DIET;  Regular    Anthropometric Measures:  · Height: 6' 2\" (188 cm)  · Current Body Weight: 150 lb (68 kg)   · Admission Body Weight: 162 lb (73.5 kg)    · Ideal Body Weight: 190 lbs; % Ideal Body Weight 78.9 %   · BMI: 19.3  · Adjusted Body Weight:  ; No Adjustment   · BMI Categories: Underweight (BMI less than 22) age over 72       Nutrition Diagnosis:   · Inadequate oral intake related to impaired respiratory function as evidenced by intake 26-50%,intake 51-75%      Nutrition Interventions:   Food and/or Nutrient Delivery:  Continue Current Diet,Start Oral Nutrition Supplement  Nutrition Education/Counseling:  No recommendation at this time Coordination of Nutrition Care:  Continue to monitor while inpatient    Goals:  Consume greater than 50% of meals and supplements this admission       Nutrition Monitoring and Evaluation:   Behavioral-Environmental Outcomes:  None Identified   Food/Nutrient Intake Outcomes:  Food and Nutrient Intake,Supplement Intake  Physical Signs/Symptoms Outcomes:  Biochemical Data,Nutrition Focused Physical Findings,Skin,Weight,Hemodynamic Status     Discharge Planning:     Too soon to determine     Electronically signed by Veronica Cooper RD, LD on 1/21/22 at 1:55 PM EST    Contact: [phone]

## 2022-01-21 NOTE — PLAN OF CARE
Problem: Falls - Risk of:  Goal: Will remain free from falls  Description: Will remain free from falls  Outcome: Ongoing  Goal: Absence of physical injury  Description: Absence of physical injury  Outcome: Ongoing     Problem: Airway Clearance - Ineffective  Goal: Achieve or maintain patent airway  Outcome: Ongoing     Problem: Gas Exchange - Impaired  Goal: Absence of hypoxia  Outcome: Ongoing  Goal: Promote optimal lung function  Outcome: Ongoing     Problem:  Body Temperature -  Risk of, Imbalanced  Goal: Ability to maintain a body temperature within defined limits  Outcome: Ongoing  Goal: Will regain or maintain usual level of consciousness  Outcome: Ongoing  Goal: Complications related to the disease process, condition or treatment will be avoided or minimized  Outcome: Ongoing     Problem: Isolation Precautions - Risk of Spread of Infection  Goal: Prevent transmission of infection  Outcome: Ongoing     Problem: Nutrition Deficits  Goal: Optimize nutritional status  Outcome: Ongoing     Problem: Risk for Fluid Volume Deficit  Goal: Maintain normal heart rhythm  Outcome: Ongoing  Goal: Maintain absence of muscle cramping  Outcome: Ongoing  Goal: Maintain normal serum potassium, sodium, calcium, phosphorus, and pH  Outcome: Ongoing     Problem: Loneliness or Risk for Loneliness  Goal: Demonstrate positive use of time alone when socialization is not possible  Outcome: Ongoing     Problem: Fatigue  Goal: Verbalize increase energy and improved vitality  Outcome: Ongoing     Problem: Patient Education: Go to Patient Education Activity  Goal: Patient/Family Education  Outcome: Ongoing     Problem: Skin Integrity:  Goal: Will show no infection signs and symptoms  Description: Will show no infection signs and symptoms  Outcome: Ongoing  Goal: Absence of new skin breakdown  Description: Absence of new skin breakdown  Outcome: Ongoing     Problem: Pain:  Goal: Pain level will decrease  Description: Pain level will decrease  Outcome: Ongoing  Goal: Control of acute pain  Description: Control of acute pain  Outcome: Ongoing  Goal: Control of chronic pain  Description: Control of chronic pain  Outcome: Ongoing

## 2022-01-21 NOTE — PLAN OF CARE
Problem: Falls - Risk of:  Goal: Will remain free from falls  Description: Will remain free from falls  1/21/2022 1633 by Yue Marshall RN  Outcome: Ongoing  1/21/2022 0338 by Naz Toribio RN  Outcome: Ongoing  Goal: Absence of physical injury  Description: Absence of physical injury  1/21/2022 1633 by Yue Marshall RN  Outcome: Ongoing  1/21/2022 0338 by Naz Toribio RN  Outcome: Ongoing   Fall risk assessment completed every shift. All precautions in place. Pt has call light within reach at all times. Room clear of clutter. Pt aware to call for assistance when getting up. Problem: Airway Clearance - Ineffective  Goal: Achieve or maintain patent airway  1/21/2022 1633 by Yue Marshall RN  Outcome: Ongoing  1/21/2022 0338 by Naz Toribio RN  Outcome: Ongoing     Problem: Gas Exchange - Impaired  Goal: Absence of hypoxia  1/21/2022 1633 by Yue Marshall RN  Outcome: Ongoing  1/21/2022 0338 by Naz Toribio RN  Outcome: Ongoing  Goal: Promote optimal lung function  1/21/2022 1633 by Yue Marshall RN  Outcome: Ongoing  1/21/2022 0338 by Naz Toribio RN  Outcome: Ongoing     Problem: Breathing Pattern - Ineffective  Goal: Ability to achieve and maintain a regular respiratory rate  1/21/2022 1633 by Yue Marshall RN  Outcome: Ongoing  1/21/2022 0338 by Naz Toribio RN  Outcome: Ongoing     Problem:  Body Temperature -  Risk of, Imbalanced  Goal: Ability to maintain a body temperature within defined limits  1/21/2022 1633 by Yue Marshall RN  Outcome: Ongoing  1/21/2022 0338 by Naz Toribio RN  Outcome: Ongoing  Goal: Will regain or maintain usual level of consciousness  1/21/2022 1633 by Yue Marshall RN  Outcome: Ongoing  1/21/2022 0338 by Naz Toribio RN  Outcome: Ongoing  Goal: Complications related to the disease process, condition or treatment will be avoided or minimized  1/21/2022 1633 by Yue Marshall RN  Outcome: Ongoing  1/21/2022 0338 by Navjot Maldonado RN  Outcome: Ongoing

## 2022-01-21 NOTE — PLAN OF CARE
Problem: Nutrition  Goal: Optimal nutrition therapy  Outcome: Ongoing     Nutrition Problem #1: Inadequate oral intake  Intervention: Food and/or Nutrient Delivery: Continue Current Diet,Start Oral Nutrition Supplement  Nutritional Goals: Consume greater than 50% of meals and supplements this admission

## 2022-01-21 NOTE — PROGRESS NOTES
Occupational Therapy  Facility/Department: [address] PROGRESSIVE CARE  Daily Treatment Note  NAME: Maritza Nance  : 1945  MRN: [id number]    Date of Service: 2022    Discharge Recommendations:  Continue to assess pending progress,Patient would benefit from continued therapy after discharge,3-5 sessions per week       Assessment   Performance deficits / Impairments: Decreased functional mobility ; Decreased ADL status; Decreased balance;Decreased strength;Decreased endurance;Decreased high-level IADLs  Assessment: Pt tolerated session fair this date. Now on 25L via Airvo. Pt performed bed mob with SBA and increased time. Requested to stay in bed. O2 sats decreased to 89% after initial position change however returned to high 90s within 30 secs and seated rest break. Pt incontinent of BM/urine (purewick leak) required max A for brief change and hygiene. Gown changed in bed. Anticipate pt will required ongoing skilled OT at low-mod frequency after d/c. Will continue to follow during acute hospitalization  OT Education: Transfer Training;OT Role;Energy Conservation; ADL Adaptive Strategies  REQUIRES OT FOLLOW UP: Yes  Activity Tolerance  Activity Tolerance: Patient limited by fatigue  Activity Tolerance: Pt's oxygen sats dropped to upper 89% but quickly recovered with brief rest break  Safety Devices  Type of devices: Call light within reach; Left in bed;Bed alarm in place;Nurse notified;Gait belt         Patient Diagnosis(es): The primary encounter diagnosis was COVID-19. Diagnoses of Generalized weakness, Acute respiratory failure with hypoxia ([address] and Atypical pneumonia were also pertinent to this visit. has no past medical history on file. has no past surgical history on file. Restrictions  Restrictions/Precautions  Restrictions/Precautions: Fall Risk,Isolation  Position Activity Restriction  Other position/activity restrictions: Droplet Plus : COVID +. O2 25L via Airvo.      Subjective General  Chart Reviewed: Yes,Progress Notes  Patient assessed for rehabilitation services?: Yes  Additional Pertinent Hx: per MD note, Patient is a 68-year-old male without significant past medical history who presented to hospital for fevers chills for about 2 weeks. Patient has been feeling tired fatigue and having generalized body aches for 2 weeks, he also mentions he has cough, nonproductive. Denied chest pain nausea vomiting diarrhea constipation. Family / Caregiver Present: No  Referring Practitioner: Jamila Diaz MD  Subjective  Subjective: Pt seen bedside, declined sitting in recliner. Pt denies pain  General Comment  Comments: okay for therapy per RN. Orientation  Orientation  Overall Orientation Status: Within Functional Limits  Objective    ADL  UE Dressing:  (gown changed)  LE Dressing: Dependent/Total (Threading brief and pulling up in stance)  Toileting: Dependent/Total (Incontinent of BM and urine; Male purewick leaked- hygiene provided and brief changed)        Balance  Sitting Balance: Stand by assistance  Standing Balance: Contact guard assistance  Functional Mobility  Functional Mobility Comments: Declined fxl ambulation d/t fatigue. Requested to stay in bed  Bed mobility  Supine to Sit: Stand by assistance  Sit to Supine: Stand by assistance  Scooting: Stand by assistance  Comment: Increased time- O2 sats decreased to 89% upone inital sitting EOB however rebounded to high 90s within 30 secs with cues for breathing technique  Transfers  Sit to stand: Contact guard assistance  Stand to sit: Contact guard assistance  Transfer Comments: to/from RW for hygiene tasks- cues for hand placement      Cognition  Overall Cognitive Status: Exceptions  Arousal/Alertness: Appropriate responses to stimuli  Following Commands:  Follows one step commands consistently  Insights: Decreased awareness of deficits        Plan   Plan  Times per week: 3-5x  Current Treatment Recommendations: Strengthening,Functional Mobility Training,Gait Training,Endurance Training,Balance Training,Safety Education & Training,Self-Care / ADL,Equipment Evaluation, Education, & procurement,Patient/Caregiver Education & Training    AM-PAC Score        AM-PAC Inpatient Daily Activity Raw Score: 16 (01/21/22 1151)  AM-PAC Inpatient ADL T-Scale Score : 35.96 (01/21/22 1151)  ADL Inpatient CMS 0-100% Score: 53.32 (01/21/22 1151)  ADL Inpatient CMS G-Code Modifier : CK (01/21/22 1151)    Goals  Short term goals  Time Frame for Short term goals: prior to D/C  Short term goal 1: complete functional mobility and transfers Mod Ind  Short term goal 2: complete bathing and dressing Mod Ind  Short term goal 3: complete toileting Mod Ind  Short term goal 4: complete grooming in stance at sink [address] term goals  Time Frame for Long term goals : STG=LTG  Patient Goals   Patient goals : no stated goals       Therapy Time   Individual Concurrent Group Co-treatment   Time In 1120         Time Out 1200         Minutes 40         Timed Code Treatment Minutes: [address] [facility], OT   Electronically signed by Asim Vizcarra OTR/L  License # [id number]

## 2022-01-21 NOTE — PROGRESS NOTES
progress;  Prognosis: Good;Fair  PT Education: PT Role;Gait Training;Functional Mobility Training;Plan of Care;General Safety; Energy Conservation;Transfer Training  Patient Education: importance of OOB activity, use of call light  REQUIRES PT FOLLOW UP: Yes  Activity Tolerance  Activity Tolerance: Patient limited by endurance  Activity Tolerance: Pt currently requiring O2 16L; does desat to 90%  following functional activities although quickly recovers. Very limited endurance. Patient Diagnosis(es): The primary encounter diagnosis was COVID-19. Diagnoses of Generalized weakness, Acute respiratory failure with hypoxia ([facility] [address] and Atypical pneumonia were also pertinent to this visit. has no past medical history on file. has no past surgical history on file. Restrictions  Restrictions/Precautions  Restrictions/Precautions: Fall Risk,Isolation  Position Activity Restriction  Other position/activity restrictions: Droplet Plus : COVID +. O2 ~16L via Airvo. Subjective   General  Chart Reviewed: Yes  Additional Pertinent Hx: 65 y/o male admit 1/14/2022 with Acute Respiratory, Pneumonia, COVID+. Reports generalized weakness; worsening over past 2 wks (inability to get up to go to bathroom). PMH insign otherwise. Response To Previous Treatment: Patient with no complaints from previous session. Family / Caregiver Present: No  Referring Practitioner: Dr. Nany Arriola. Subjective  Subjective: Pt supine in bed upon arrival. Pt without any new complaints and agreeable to work with encouragement. Objective   Bed mobility  Supine to Sit: Stand by assistance (slightly elevated HOB;  cues for sequencing and required increased time and effort.  SpO2 remained above 90%)  Scooting: Stand by assistance  Transfers  Sit to Stand: Minimal Assistance (from EOB to RW; cues for hand placement)  Stand to sit: Minimal Assistance (From RW to recliner; cues for hand placement)  Bed to Chair: Contact guard assistance;Minimal assistance (with RW)  Ambulation  Ambulation?: Yes  Ambulation 1  Surface: level tile  Device: Rolling Walker  Quality of Gait: slow shawna, decreased step length and height, requires assist to manage lines and RW intermittently  Distance: Pt amb ~4-5 steps bed to chair with Walker CGA-Min assist.  limited endurance. Comments: desated to 90% but increased to mid 90's in ~30 sec with seated rest and PLB. AM-PAC Score  AM-PAC Inpatient Mobility Raw Score : 15 (01/21/22 1503)  AM-PAC Inpatient T-Scale Score : 39.45 (01/21/22 1503)  Mobility Inpatient CMS 0-100% Score: 57.7 (01/21/22 1503)  Mobility Inpatient CMS G-Code Modifier : CK (01/21/22 1503)          Goals  Short term goals  Time Frame for Short term goals: Upon d/c acute care setting. Short term goal 1: Bed Mob SBA. Short term goal 2: Transfers with/without assist device CGA. Short term goal 3: Amb with/without assist device 22' CGA; sats remain at least high 80's/low 90's. Short term goal 4: Pt participating in approp Strength Exs. Patient Goals   Patient goals : Get stronger and return home. Plan    Plan  Times per week: 3-5x week while in acute care setting.   Current Treatment Recommendations: Strengthening,Functional Mobility Training,Transfer Training,Gait Training,Safety Education & Training,Patient/Caregiver Education & Training  Safety Devices  Type of devices: Call light within reach,Chair alarm in place,Left in chair,Nurse notified     Therapy Time   Individual Concurrent Group Co-treatment   Time In 1627         Time Out 1500         Minutes 45         Timed Code Treatment Minutes: 45 Minutes     Electronically signed by Jolene Wilde, [address] [place] [address] on 1/21/2022 at 3:04 PM

## 2022-01-22 NOTE — PROGRESS NOTES
Hospitalist Progress Note      PCP: No primary care provider on file. Date of Admission: 1/14/2022    Chief Complaint: SOB/ fatigue. Hospital Course:    20-year-old male without significant past medical history who presented to hospital for fevers chills for about 2 weeks.  Patient has been feeling tired fatigue and having generalized body aches for 2 weeks, he also mentions he has cough, nonproductive.  Denied chest pain nausea vomiting diarrhea constipation. Admitted for COVID pneumonia. 1/20 worsening oxygen requirements, placed on vapotherm. Subjective:   Denies dionna new complaints. currently on 20 liters, 85% ( slight improvement). Medications:  Reviewed    Infusion Medications    sodium chloride      sodium chloride 25 mL (01/18/22 2103)     Scheduled Medications    enoxaparin  30 mg SubCUTAneous Nightly    furosemide  40 mg Oral Daily    dexamethasone  10 mg IntraVENous Q24H    sodium chloride flush  10 mL IntraVENous 2 times per day     PRN Meds: diphenhydrAMINE, benzocaine-menthol, sodium chloride, sodium chloride flush, sodium chloride, potassium chloride **OR** potassium alternative oral replacement **OR** potassium chloride, potassium chloride, magnesium sulfate, promethazine **OR** ondansetron, magnesium hydroxide, acetaminophen **OR** acetaminophen      Intake/Output Summary (Last 24 hours) at 1/22/2022 1258  Last data filed at 1/22/2022 0006  Gross per 24 hour   Intake --   Output 900 ml   Net -900 ml       Physical Exam Performed:    /64   Pulse 84   Temp 98 °F (36.7 °C) (Oral)   Resp 18   Ht 6' 2\" (1.88 m)   Wt 154 lb (69.9 kg)   SpO2 92%   BMI 19.77 kg/m²     General appearance: No apparent distress, appears stated age and cooperative. on HFNC. HEENT: Pupils equal, round, and reactive to light. Conjunctivae/corneas clear. Neck: Supple, with full range of motion. No jugular venous distention. Trachea midline. Respiratory:  Normal respiratory effort. Clear to auscultation, bilaterally without Rales/Wheezes/Rhonchi. Cardiovascular: Regular rate and rhythm with normal S1/S2 without murmurs, rubs or gallops. Abdomen: Soft, non-tender, non-distended with normal bowel sounds. Musculoskeletal: No clubbing, cyanosis or edema bilaterally. Full range of motion without deformity. Skin: Skin color, texture, turgor normal.  No rashes or lesions. Neurologic:  Neurovascularly intact without any focal sensory/motor deficits. Cranial nerves: II-XII intact, grossly non-focal.sensation intact. Patient reports some tingling in the right hand. Psychiatric: Alert and oriented, thought content appropriate, normal insight  Capillary Refill: Brisk,3 seconds, normal   Peripheral Pulses: +2 palpable, equal bilaterally       Labs:   Recent Labs     01/20/22  0500 01/21/22  0635 01/22/22  0637   WBC 13.1* 10.3 7.4   HGB 7.3* 7.6* 7.0*   HCT 21.6* 21.9* 20.2*   * 136 149     Recent Labs     01/20/22  0500 01/21/22  0635 01/22/22  0637    139 138   K 4.4 4.5 4.3    100 100   CO2 26 30 30   BUN 37* 32* 29*   CREATININE 0.6* 0.5* <0.5*   CALCIUM 7.9* 7.7* 7.9*     Recent Labs     01/20/22  0500 01/21/22  0635 01/22/22  0637   AST 17 15 13*   ALT 24 20 18   BILIDIR <0.2 <0.2 <0.2   BILITOT 0.5 0.6 0.6   ALKPHOS 105 104 102     No results for input(s): INR in the last 72 hours. No results for input(s): Ernesto Bares in the last 72 hours. Urinalysis:      Lab Results   Component Value Date    NITRU Negative 01/17/2022    WBCUA 0 01/17/2022    RBCUA 1 01/17/2022    BLOODU Negative 01/17/2022    SPECGRAV 1.013 01/17/2022    GLUCOSEU Negative 01/17/2022       Radiology:  XR CHEST PORTABLE   Final Result   Borderline cardiomegaly and moderate central pulmonary congestion. Hazy ground-glass opacities along the perihilar regions which is more   prominent and could represent pulmonary edema and/or developing pneumonia.          XR CHEST 1 VIEW   Final Result Patchy infiltrates within both lungs, most compatible with multifocal   pneumonia, especially COVID-19 pneumonia. All in all, findings are similar   when compared to the previous exam from yesterday. XR CHEST PORTABLE   Final Result   Ground-glass and interstitial opacities, indeterminate for pulmonary edema or   atypical pneumonia, including COVID. Minimal consolidation in the left base, asymmetrical airspace disease or   atelectasis. Assessment/Plan:    Active Hospital Problems    Diagnosis     Hypoxia [R09.02]      Acute hypoxic respiratory failure. Covid 19 Pneumonia. Sepsis. Patient was admitted with COVID-pneumonia, continues to be on 15 L via nasal cannula. S/p Actemra 1/16. Procalcitonin 0.98--> 0.24 ( completed course of ceftriaxone /azithromycin). worsening oxygen requirements, started on HFNC 1/20. Continues to in HFNC. CXR 1/21 with no changes. Plan.  -Wean down oxygen as tolerated. -Incentive spirometry  -Continue on dexamethasone 10 mg. Acute blood loss anemia. No active signs of bleeding, received 1 unit of PRBC. Hb today is 7.0. Iron studies normal, likely bone marrow suppression due to COVID  Occult blood negative   Plan.  -Continue to monitor hemoglobin. - transfuse if hb drops below 7. Right upper limb numbness. Seems positional, none dermatomal with no decrease in sensation. Will continue to follow symptoms. Elevated troponin likely demand ischemia  Resolved. Social.   Patient continues to be full code with no limitations in care     DVT Prophylaxis: lovenox  Diet: ADULT DIET;  Regular  ADULT ORAL NUTRITION SUPPLEMENT; Breakfast, Lunch, Dinner; Standard High Calorie/High Protein Oral Supplement  Code Status: Full Code    PT/OT Eval Status: following    Dispo - pending clinical improvement      Poornima Puentes MD

## 2022-01-22 NOTE — PLAN OF CARE
Problem: Falls - Risk of:  Goal: Will remain free from falls  Description: Will remain free from falls  Outcome: Ongoing  Goal: Absence of physical injury  Description: Absence of physical injury  Outcome: Ongoing     Problem: Airway Clearance - Ineffective  Goal: Achieve or maintain patent airway  Outcome: Ongoing     Problem: Gas Exchange - Impaired  Goal: Absence of hypoxia  Outcome: Ongoing  Goal: Promote optimal lung function  Outcome: Ongoing     Problem: Breathing Pattern - Ineffective  Goal: Ability to achieve and maintain a regular respiratory rate  Outcome: Ongoing     Problem:  Body Temperature -  Risk of, Imbalanced  Goal: Ability to maintain a body temperature within defined limits  Outcome: Ongoing  Goal: Will regain or maintain usual level of consciousness    Outcome: Ongoing  Goal: Complications related to the disease process, condition or treatment will be avoided or minimized  Outcome: Ongoing     Problem: Isolation Precautions - Risk of Spread of Infection  Goal: Prevent transmission of infection  Outcome: Ongoing     Problem: Nutrition Deficits  Goal: Optimize nutritional status  Outcome: Ongoing     Problem: Risk for Fluid Volume Deficit  Goal: Maintain normal heart rhythm  Outcome: Ongoing  Goal: Maintain absence of muscle cramping  Outcome: Ongoing  Goal: Maintain normal serum potassium, sodium, calcium, phosphorus, and pH  Outcome: Ongoing     Problem: Loneliness or Risk for Loneliness  Goal: Demonstrate positive use of time alone when socialization is not possible  Outcome: Ongoing     Problem: Fatigue  Goal: Verbalize increase energy and improved vitality  Outcome: Ongoing     Problem: Patient Education: Go to Patient Education Activity  Goal: Patient/Family Education  Outcome: Ongoing     Problem: Skin Integrity:  Goal: Will show no infection signs and symptoms  Description: Will show no infection signs and symptoms  Outcome: Ongoing  Goal: Absence of new skin breakdown  Description: Absence of new skin breakdown  Outcome: Ongoing     Problem: Pain:  Goal: Pain level will decrease  Description: Pain level will decrease  Outcome: Ongoing  Goal: Control of acute pain  Description: Control of acute pain  Outcome: Ongoing  Goal: Control of chronic pain  Description: Control of chronic pain  Outcome: Ongoing     Problem: Nutrition  Goal: Optimal nutrition therapy  Outcome: Ongoing

## 2022-01-23 NOTE — PROGRESS NOTES
This RN observed the patient for the first 15 minutes taking vitals every 5 minutes. No reaction observed.

## 2022-01-23 NOTE — PROGRESS NOTES
1 unit PRBCs transfused through L PIV. Post transfusion vitals taken. Repeat H/H timed for 1400. Pt tolerated well. No complications or signs of transfusion reaction. Pt sitting up in bed with lunch on 14L HFNC with no needs at this time. RN will continue to monitor.  Electronically signed by Eddie Stanton RN on 1/23/2022 at 12:44 PM

## 2022-01-23 NOTE — PLAN OF CARE
Problem: Falls - Risk of:  Goal: Will remain free from falls  Description: Will remain free from falls  1/23/2022 8557 by Emile Mancilla RN  Outcome: Ongoing   Fall risk assessment completed every shift. All precautions in place. Pt has call light within reach at all times. Room clear of clutter. Pt aware to call for assistance when getting up. Bed locked in lowest position, alarm engaged, call light within reach, will continue to monitor. Problem: Airway Clearance - Ineffective  Goal: Achieve or maintain patent airway  1/23/2022 0926 by Emile Mancilla RN  Outcome: Ongoing     Problem: Gas Exchange - Impaired  Goal: Absence of hypoxia  1/23/2022 0926 by Emile Mancilla RN  Outcome: Ongoing     Problem:  Body Temperature -  Risk of, Imbalanced  Goal: Ability to maintain a body temperature within defined limits  1/23/2022 0926 by Emile Mancilla RN  Outcome: Ongoing     Problem: Isolation Precautions - Risk of Spread of Infection  Goal: Prevent transmission of infection  1/23/2022 0926 by Emile Mancilla RN  Outcome: Ongoing     Problem: Risk for Fluid Volume Deficit  Goal: Maintain normal heart rhythm  1/23/2022 0926 by Emile Mancilla RN  Outcome: Ongoing     Problem: Skin Integrity:  Goal: Will show no infection signs and symptoms  Description: Will show no infection signs and symptoms  1/23/2022 0926 by Emile Mancilla RN  Outcome: Ongoing     Problem: Pain:  Goal: Pain level will decrease  Description: Pain level will decrease  1/23/2022 0926 by Emile Mancilla RN  Outcome: Ongoing

## 2022-01-23 NOTE — PROGRESS NOTES
Hospitalist Progress Note      PCP: No primary care provider on file. Date of Admission: 1/14/2022    Chief Complaint: SOB/ fatigue. Hospital Course:    64-year-old male without significant past medical history who presented to hospital for fevers chills for about 2 weeks.  Patient has been feeling tired fatigue and having generalized body aches for 2 weeks, he also mentions he has cough, nonproductive.  Denied chest pain nausea vomiting diarrhea constipation. Admitted for COVID pneumonia. 1/20 worsening oxygen requirements, placed on vapotherm. Subjective:   Denies dionna new complaints. On 15 liter ( improved from 20) of 100% O2. Medications:  Reviewed    Infusion Medications    sodium chloride      sodium chloride      sodium chloride 25 mL (01/18/22 2103)     Scheduled Medications    enoxaparin  30 mg SubCUTAneous Nightly    furosemide  40 mg Oral Daily    dexamethasone  10 mg IntraVENous Q24H    sodium chloride flush  10 mL IntraVENous 2 times per day     PRN Meds: sodium chloride, diphenhydrAMINE, benzocaine-menthol, sodium chloride, sodium chloride flush, sodium chloride, potassium chloride **OR** potassium alternative oral replacement **OR** potassium chloride, potassium chloride, magnesium sulfate, promethazine **OR** ondansetron, magnesium hydroxide, acetaminophen **OR** acetaminophen      Intake/Output Summary (Last 24 hours) at 1/23/2022 0830  Last data filed at 1/23/2022 0152  Gross per 24 hour   Intake 480 ml   Output 1200 ml   Net -720 ml       Physical Exam Performed:    BP (!) 102/50   Pulse 90   Temp 98 °F (36.7 °C) (Oral)   Resp 18   Ht 6' 2\" (1.88 m)   Wt 154 lb 5.2 oz (70 kg)   SpO2 92%   BMI 19.81 kg/m²     General appearance: No apparent distress, appears stated age and cooperative. on HFNC. HEENT: Pupils equal, round, and reactive to light. Conjunctivae/corneas clear. Neck: Supple, with full range of motion. No jugular venous distention.  Trachea midline. Respiratory:  Normal respiratory effort. Clear to auscultation, bilaterally without Rales/Wheezes/Rhonchi. Cardiovascular: Regular rate and rhythm with normal S1/S2 without murmurs, rubs or gallops. Abdomen: Soft, non-tender, non-distended with normal bowel sounds. Musculoskeletal: No clubbing, cyanosis or edema bilaterally. Full range of motion without deformity. Skin: Skin color, texture, turgor normal.  No rashes or lesions. Neurologic:  Neurovascularly intact without any focal sensory/motor deficits. Cranial nerves: II-XII intact, grossly non-focal.sensation intact. Patient reports some tingling in the right hand. Psychiatric: Alert and oriented, thought content appropriate, normal insight  Capillary Refill: Brisk,3 seconds, normal   Peripheral Pulses: +2 palpable, equal bilaterally       Labs:   Recent Labs     01/21/22  0635 01/22/22  0637 01/23/22  0611   WBC 10.3 7.4 5.9   HGB 7.6* 7.0* 6.4*   HCT 21.9* 20.2* 18.5*    149 158     Recent Labs     01/21/22  0635 01/22/22  0637 01/23/22  0611    138 136   K 4.5 4.3 4.4    100 98*   CO2 30 30 31   BUN 32* 29* 31*   CREATININE 0.5* <0.5* <0.5*   CALCIUM 7.7* 7.9* 7.8*     Recent Labs     01/21/22  0635 01/22/22  0637 01/23/22  0611   AST 15 13* 13*   ALT 20 18 17   BILIDIR <0.2 <0.2 <0.2   BILITOT 0.6 0.6 0.6   ALKPHOS 104 102 99     No results for input(s): INR in the last 72 hours. No results for input(s): Lorena Anchors in the last 72 hours. Urinalysis:      Lab Results   Component Value Date    NITRU Negative 01/17/2022    WBCUA 0 01/17/2022    RBCUA 1 01/17/2022    BLOODU Negative 01/17/2022    SPECGRAV 1.013 01/17/2022    GLUCOSEU Negative 01/17/2022       Radiology:  XR CHEST PORTABLE   Final Result   Borderline cardiomegaly and moderate central pulmonary congestion.       Hazy ground-glass opacities along the perihilar regions which is more   prominent and could represent pulmonary edema and/or developing pneumonia. XR CHEST 1 VIEW   Final Result   Patchy infiltrates within both lungs, most compatible with multifocal   pneumonia, especially COVID-19 pneumonia. All in all, findings are similar   when compared to the previous exam from yesterday. XR CHEST PORTABLE   Final Result   Ground-glass and interstitial opacities, indeterminate for pulmonary edema or   atypical pneumonia, including COVID. Minimal consolidation in the left base, asymmetrical airspace disease or   atelectasis. Assessment/Plan:    Active Hospital Problems    Diagnosis     Hypoxia [R09.02]      Acute hypoxic respiratory failure. Covid 19 Pneumonia. Sepsis. Patient was admitted with COVID-pneumonia, continues to be on 15 L via nasal cannula. S/p Actemra 1/16. Procalcitonin 0.98--> 0.24 ( completed course of ceftriaxone /azithromycin). worsening oxygen requirements, started on HFNC 1/20. Continues to in HFNC ( slightly improved). CXR 1/21 with no changes. Plan.  -Wean down oxygen as tolerated. -Incentive spirometry  -Continue on dexamethasone 10 mg. Acute blood loss anemia. No active signs of bleeding, received 1 unit of PRBC. Hb today is 6.4  Iron studies normal, likely bone marrow suppression due to COVID  Occult blood negative   Plan.  -Continue to monitor hemoglobin.     -transuse 1 unit of PRBC/  - send for retic count. - would consult hematology if hb continues to drop    Right upper limb numbness. Seems positional, none dermatomal with no decrease in sensation. Will continue to follow symptoms. Elevated troponin likely demand ischemia  Resolved. Social.   Patient continues to be full code with no limitations in care     DVT Prophylaxis: SCDS ( due to anemia for the next 1-2 days) . Diet: ADULT DIET;  Regular  ADULT ORAL NUTRITION SUPPLEMENT; Breakfast, Lunch, Dinner; Standard High Calorie/High Protein Oral Supplement  Code Status: Full Code    PT/OT Eval Status: following    Dispo - pending clinical improvement      Shirley Beltrán MD

## 2022-01-23 NOTE — PLAN OF CARE
Problem: Falls - Risk of:  Goal: Will remain free from falls  Description: Will remain free from falls  Outcome: Ongoing  Goal: Absence of physical injury  Description: Absence of physical injury  Outcome: Ongoing     Problem: Airway Clearance - Ineffective  Goal: Achieve or maintain patent airway  Outcome: Ongoing     Problem: Gas Exchange - Impaired  Goal: Absence of hypoxia  Outcome: Ongoing  Goal: Promote optimal lung function  Outcome: Ongoing     Problem: Breathing Pattern - Ineffective  Goal: Ability to achieve and maintain a regular respiratory rate  Outcome: Ongoing     Problem:  Body Temperature -  Risk of, Imbalanced  Goal: Ability to maintain a body temperature within defined limits  Outcome: Ongoing  Goal: Will regain or maintain usual level of consciousness  Outcome: Ongoing  Goal: Complications related to the disease process, condition or treatment will be avoided or minimized  Outcome: Ongoing     Problem: Isolation Precautions - Risk of Spread of Infection  Goal: Prevent transmission of infection  Outcome: Ongoing     Problem: Nutrition Deficits  Goal: Optimize nutritional status  Outcome: Ongoing     Problem: Risk for Fluid Volume Deficit  Goal: Maintain normal heart rhythm  Outcome: Ongoing  Goal: Maintain absence of muscle cramping  Outcome: Ongoing  Goal: Maintain normal serum potassium, sodium, calcium, phosphorus, and pH  Outcome: Ongoing     Problem: Loneliness or Risk for Loneliness  Goal: Demonstrate positive use of time alone when socialization is not possible  Outcome: Ongoing     Problem: Fatigue  Goal: Verbalize increase energy and improved vitality  Outcome: Ongoing     Problem: Patient Education: Go to Patient Education Activity  Goal: Patient/Family Education  Outcome: Ongoing     Problem: Skin Integrity:  Goal: Will show no infection signs and symptoms  Description: Will show no infection signs and symptoms  Outcome: Ongoing  Goal: Absence of new skin breakdown  Description: Absence of new skin breakdown  Outcome: Ongoing     Problem: Pain:  Goal: Pain level will decrease  Description: Pain level will decrease  Outcome: Ongoing  Goal: Control of acute pain  Description: Control of acute pain  Outcome: Ongoing  Goal: Control of chronic pain  Description: Control of chronic pain  Outcome: Ongoing  Goal: Patient's pain/discomfort is manageable  Description: Patient's pain/discomfort is manageable  Outcome: Ongoing     Problem: Nutrition  Goal: Optimal nutrition therapy  Outcome: Ongoing     Problem: Infection:  Goal: Will remain free from infection  Description: Will remain free from infection  Outcome: Ongoing     Problem: Safety:  Goal: Free from accidental physical injury  Description: Free from accidental physical injury  Outcome: Ongoing  Goal: Free from intentional harm  Description: Free from intentional harm  Outcome: Ongoing     Problem: Daily Care:  Goal: Daily care needs are met  Description: Daily care needs are met  Outcome: Ongoing     Problem: Skin Integrity:  Goal: Skin integrity will stabilize  Description: Skin integrity will stabilize  Outcome: Ongoing     Problem: Discharge Planning:  Goal: Patients continuum of care needs are met  Description: Patients continuum of care needs are met  Outcome: Ongoing

## 2022-01-24 NOTE — PLAN OF CARE
Problem: Falls - Risk of:  Goal: Will remain free from falls  Description: Will remain free from falls  1/23/2022 2221 by Hugh Schreiber RN  Outcome: Ongoing     Problem: Falls - Risk of:  Goal: Absence of physical injury  Description: Absence of physical injury  1/23/2022 2221 by Hugh Schreiber RN  Outcome: Ongoing     Problem: Airway Clearance - Ineffective  Goal: Achieve or maintain patent airway  1/23/2022 2221 by Hugh Schreiber RN  Outcome: Ongoing     Problem: Gas Exchange - Impaired  Goal: Absence of hypoxia  1/23/2022 2221 by Hugh Schreiber RN  Outcome: Ongoing     Problem: Gas Exchange - Impaired  Goal: Promote optimal lung function  1/23/2022 2221 by Hugh Schreiber RN  Outcome: Ongoing     Problem: Breathing Pattern - Ineffective  Goal: Ability to achieve and maintain a regular respiratory rate  1/23/2022 2221 by Hugh Schreiber RN  Outcome: Ongoing     Problem: Body Temperature -  Risk of, Imbalanced  Goal: Ability to maintain a body temperature within defined limits  1/23/2022 2221 by Hugh Schreiber RN  Outcome: Ongoing     Problem:  Body Temperature -  Risk of, Imbalanced  Goal: Complications related to the disease process, condition or treatment will be avoided or minimized  1/23/2022 2221 by Hugh Schreiber RN  Outcome: Ongoing     Problem: Isolation Precautions - Risk of Spread of Infection  Goal: Prevent transmission of infection  1/23/2022 2221 by Hugh Schreiber RN  Outcome: Ongoing     Problem: Nutrition Deficits  Goal: Optimize nutritional status  1/23/2022 2221 by Hugh Schreiber RN  Outcome: Ongoing     Problem: Risk for Fluid Volume Deficit  Goal: Maintain normal heart rhythm  1/23/2022 2221 by Hugh Schreiber RN  Outcome: Ongoing     Problem: Risk for Fluid Volume Deficit  Goal: Maintain absence of muscle cramping  1/23/2022 2221 by Hugh Schreiber RN  Outcome: Ongoing     Problem: Risk for Fluid Volume Deficit  Goal: Maintain normal serum potassium, sodium, calcium, phosphorus, and pH  1/23/2022 2221 by Gisela Valencia RN  Outcome: Ongoing     Problem: Loneliness or Risk for Loneliness  Goal: Demonstrate positive use of time alone when socialization is not possible  1/23/2022 2221 by Gisela Valencia RN  Outcome: Ongoing     Problem: Fatigue  Goal: Verbalize increase energy and improved vitality  1/23/2022 2221 by Gisela Valencia RN  Outcome: Ongoing     Problem: Patient Education: Go to Patient Education Activity  Goal: Patient/Family Education  1/23/2022 2221 by Gisela Valencia RN  Outcome: Ongoing     Problem: Skin Integrity:  Goal: Will show no infection signs and symptoms  Description: Will show no infection signs and symptoms  1/23/2022 2221 by Gisela Valencia RN  Outcome: Ongoing     Problem: Skin Integrity:  Goal: Absence of new skin breakdown  Description: Absence of new skin breakdown  1/23/2022 2221 by Gisela Valencia RN  Outcome: Ongoing     Problem: Pain:  Goal: Pain level will decrease  Description: Pain level will decrease  1/23/2022 2221 by Gisela Valencia RN  Outcome: Ongoing     Problem: Pain:  Goal: Control of acute pain  Description: Control of acute pain  1/23/2022 2221 by Gisela Valencia RN  Outcome: Ongoing     Problem: Pain:  Goal: Control of chronic pain  Description: Control of chronic pain  1/23/2022 2221 by Gisela Valencia RN  Outcome: Ongoing     Problem: Pain:  Goal: Patient's pain/discomfort is manageable  Description: Patient's pain/discomfort is manageable  1/23/2022 2221 by Gisela Valencia RN  Outcome: Ongoing     Problem: Nutrition  Goal: Optimal nutrition therapy  1/23/2022 2221 by Gisela Valencia RN  Outcome: Ongoing     Problem: Infection:  Goal: Will remain free from infection  Description: Will remain free from infection  1/23/2022 2221 by Gisela Valencia RN  Outcome: Ongoing     Problem: Safety:  Goal: Free from accidental physical injury  Description: Free from accidental physical injury  1/23/2022 2221 by Gisela Valencia RN  Outcome: Ongoing

## 2022-01-24 NOTE — PROGRESS NOTES
Hospitalist Progress Note      PCP: No primary care provider on file. Date of Admission: 1/14/2022    Chief Complaint: SOB/ fatigue. Hospital Course:    17-year-old male without significant past medical history who presented to hospital for fevers chills for about 2 weeks.  Patient has been feeling tired fatigue and having generalized body aches for 2 weeks, he also mentions he has cough, nonproductive.  Denied chest pain nausea vomiting diarrhea constipation. Admitted for COVID pneumonia. 1/20 worsening oxygen requirements, placed on vapotherm. Subjective:   Denies dionna new complaints. Weaned down to 10 liters NC    Medications:  Reviewed    Infusion Medications    sodium chloride      sodium chloride      sodium chloride 25 mL (01/18/22 2103)     Scheduled Medications    enoxaparin  30 mg SubCUTAneous Nightly    furosemide  40 mg Oral Daily    sodium chloride flush  10 mL IntraVENous 2 times per day     PRN Meds: sodium chloride, diphenhydrAMINE, benzocaine-menthol, sodium chloride, sodium chloride flush, sodium chloride, potassium chloride **OR** potassium alternative oral replacement **OR** potassium chloride, potassium chloride, magnesium sulfate, promethazine **OR** ondansetron, magnesium hydroxide, acetaminophen **OR** acetaminophen      Intake/Output Summary (Last 24 hours) at 1/24/2022 0840  Last data filed at 1/23/2022 1400  Gross per 24 hour   Intake 1138.33 ml   Output 1300 ml   Net -161.67 ml       Physical Exam Performed:    /62   Pulse 91   Temp 97.6 °F (36.4 °C) (Oral)   Resp 18   Ht 6' 2\" (1.88 m)   Wt 151 lb 7.3 oz (68.7 kg)   SpO2 90%   BMI 19.45 kg/m²     General appearance: No apparent distress, appears stated age and cooperative. on HFNC. HEENT: Pupils equal, round, and reactive to light. Conjunctivae/corneas clear. Neck: Supple, with full range of motion. No jugular venous distention. Trachea midline. Respiratory:  Normal respiratory effort.  Clear to auscultation, bilaterally without Rales/Wheezes/Rhonchi. Cardiovascular: Regular rate and rhythm with normal S1/S2 without murmurs, rubs or gallops. Abdomen: Soft, non-tender, non-distended with normal bowel sounds. Musculoskeletal: No clubbing, cyanosis or edema bilaterally. Full range of motion without deformity. Skin: Skin color, texture, turgor normal.  No rashes or lesions. Neurologic:  Neurovascularly intact without any focal sensory/motor deficits. Cranial nerves: II-XII intact, grossly non-focal.sensation intact. Patient reports some tingling in the right hand. Psychiatric: Alert and oriented, thought content appropriate, normal insight  Capillary Refill: Brisk,3 seconds, normal   Peripheral Pulses: +2 palpable, equal bilaterally       Labs:   Recent Labs     01/22/22  0637 01/22/22  0637 01/23/22  0611 01/23/22  1419 01/24/22  0523   WBC 7.4  --  5.9  --  4.6   HGB 7.0*   < > 6.4* 7.8* 8.3*   HCT 20.2*   < > 18.6*  18.5* 23.1* 24.3*     --  158  --  179    < > = values in this interval not displayed. Recent Labs     01/22/22  0637 01/23/22  0611 01/24/22  0523    136 137   K 4.3 4.4 4.8    98* 99   CO2 30 31 30   BUN 29* 31* 21*   CREATININE <0.5* <0.5* 0.5*   CALCIUM 7.9* 7.8* 8.0*     Recent Labs     01/22/22  0637 01/23/22  0611 01/24/22  0523   AST 13* 13* 16   ALT 18 17 20   BILIDIR <0.2 <0.2 0.3   BILITOT 0.6 0.6 0.7   ALKPHOS 102 99 104     No results for input(s): INR in the last 72 hours. No results for input(s): Mattie Ends in the last 72 hours. Urinalysis:      Lab Results   Component Value Date    NITRU Negative 01/17/2022    WBCUA 0 01/17/2022    RBCUA 1 01/17/2022    BLOODU Negative 01/17/2022    SPECGRAV 1.013 01/17/2022    GLUCOSEU Negative 01/17/2022       Radiology:  XR CHEST PORTABLE   Final Result   Borderline cardiomegaly and moderate central pulmonary congestion.       Hazy ground-glass opacities along the perihilar regions which is more prominent and could represent pulmonary edema and/or developing pneumonia. XR CHEST 1 VIEW   Final Result   Patchy infiltrates within both lungs, most compatible with multifocal   pneumonia, especially COVID-19 pneumonia. All in all, findings are similar   when compared to the previous exam from yesterday. XR CHEST PORTABLE   Final Result   Ground-glass and interstitial opacities, indeterminate for pulmonary edema or   atypical pneumonia, including COVID. Minimal consolidation in the left base, asymmetrical airspace disease or   atelectasis. Assessment/Plan:    Active Hospital Problems    Diagnosis     Hypoxia [R09.02]      Acute hypoxic respiratory failure. Covid 19 Pneumonia. Sepsis. Patient was admitted with COVID-pneumonia, was initially on  on 15 L via nasal cannula. S/p Actemra 1/16. Procalcitonin 0.98--> 0.24 ( completed course of ceftriaxone /azithromycin). worsening oxygen requirements, started on HFNC 1/20. Continues to in HFNC ( slightly improved). CXR 1/21 with no changes. Completed 10 days of dexamethasone ( completed 10/24). Plan.  -Wean down oxygen as tolerated. -Incentive spirometry. Acute blood loss anemia. No active signs of bleeding, received 2 unit of PRBC since admission  Hb today is 8.3  Iron studies normal, likely bone marrow suppression due to COVID  Occult blood negative. retic 0.54 , retic index indicating Hypoproliferation. Plan.  -Continue to monitor hemoglobin. - would consult hematology if hb continues to drop again. Right upper limb numbness ( stable)  Seems positional, none dermatomal with no decrease in sensation. Will continue to follow symptoms. Elevated troponin likely demand ischemia  Resolved. Social.   Patient continues to be full code with no limitations in care     DVT Prophylaxis:lovenox  Diet: ADULT DIET;  Regular  ADULT ORAL NUTRITION SUPPLEMENT; Breakfast, Lunch, Dinner; Standard High Calorie/High Protein Oral Supplement  Code Status: Full Code    PT/OT Eval Status: following    Dispo - pending clinical improvement      Kimmy Chamberlain MD

## 2022-01-24 NOTE — PROGRESS NOTES
Patient sleeping when RN entered room. Patient easily woke to voice. Patient is alert and oriented. Follows commands. No complaints of pain. Patient is currently on 10L high flow nasal cannula with humidity. Patient states that he does not wear any oxygen at home. Wet cough noted. Patient coughed up thick yellow/red phlegm. Patient encouraged to prone and use acapella and incentive spirometer as much as possible, patient verbalized understanding. Morning medication given whole with a sip of water, tolerated well. Patient turned, cleaned up and repositioned in bed. Patient had a large soft bowel movement. Patient is currently sitting up in bed eating breakfast. Bed locked, bed alarm on. Call light within reach. Will continue to monitor.      Electronically signed by Valerie Kohli RN on 1/24/2022 at 8:39 AM

## 2022-01-24 NOTE — PROGRESS NOTES
Occupational Therapy  Facility/Department: [address] PROGRESSIVE CARE  Daily Treatment Note  NAME: Julissa Blanton  : 1945  MRN: [id number]    Date of Service: 2022    Discharge Recommendations:  Continue to assess pending progress,Patient would benefit from continued therapy after discharge,3-5 sessions per week       Assessment   Performance deficits / Impairments: Decreased functional mobility ; Decreased ADL status; Decreased balance;Decreased strength;Decreased endurance;Decreased high-level IADLs  Assessment: PT liimited to decreased endurance and increased oxygen demand. Pt able to asssit with rolling in the bed for toilet hygiene. He required total assist due to incontinence of stool. Pt will require continued therapy at low-moderate frequency after d/c. Prognosis: Fair;Good  REQUIRES OT FOLLOW UP: Yes  Activity Tolerance  Activity Tolerance: Patient limited by fatigue  Activity Tolerance: Pt's oxygen dropped to upper 70's with mobility in bed. INcreased oxygen to 9L and pt eventually recovered to 90%. Decreasedback to 8L before leaving the room. Safety Devices  Type of devices: Call light within reach; Bed alarm in place; Left in bed;Gait belt         Patient Diagnosis(es): The primary encounter diagnosis was COVID-19. Diagnoses of Generalized weakness, Acute respiratory failure with hypoxia ([address] and Atypical pneumonia were also pertinent to this visit. has no past medical history on file. has no past surgical history on file. Restrictions  Restrictions/Precautions  Restrictions/Precautions: Fall Risk,Isolation  Position Activity Restriction  Other position/activity restrictions: Droplet Plus : COVID +. O2 8L oxygen.   Subjective   General  Chart Reviewed: Yes,Progress Notes  Patient assessed for rehabilitation services?: Yes  Additional Pertinent Hx: per MD note, Patient is a 26-year-old male without significant past medical history who presented to hospital for fevers chills for about 2 weeks. Patient has been feeling tired fatigue and having generalized body aches for 2 weeks, he also mentions he has cough, nonproductive. Denied chest pain nausea vomiting diarrhea constipation. Family / Caregiver Present: No  Referring Practitioner: Johnathan Murcia MD  Subjective  Subjective: Pt seen bedside. Pt c/o fatigue. General Comment  Comments: okay for therapy per RN. Objective    ADL  Feeding: Setup  Toileting: Dependent/Total (Pt incontinent of bowel and also has male external catheter.)           Bed mobility  Rolling to Left: Stand by assistance (using  bed rail)  Rolling to Right: Stand by assistance (using bed rail)  Scooting: Maximal assistance (scooting up to the [facility]. )                                                                    Plan   Plan  Times per week: 3-5x  Current Treatment Recommendations: Strengthening,Functional Mobility Training,Gait Training,Endurance Training,Balance Training,Safety Education & Training,Self-Care / ADL,Equipment Evaluation, Education, & procurement,Patient/Caregiver Education & Training    AM-PAC Score        AM-PAC Inpatient Daily Activity Raw Score: 16 (01/18/22 1204)  AM-PAC Inpatient ADL T-Scale Score : 35.96 (01/18/22 1204)  ADL Inpatient CMS 0-100% Score: 53.32 (01/18/22 1204)  ADL Inpatient CMS G-Code Modifier : CK (01/18/22 1204)    Goals  Short term goals  Time Frame for Short term goals: prior to D/C  Short term goal 1: complete functional mobility and transfers Mod Ind  Short term goal 2: complete bathing and dressing Mod Ind  Short term goal 3: complete toileting Mod Ind  Short term goal 4: complete grooming in stance at sink [address] term goals  Time Frame for Long term goals : STG=LTG  Patient Goals   Patient goals : no stated goals       Therapy Time   Individual Concurrent Group Co-treatment   Time In 5359         Time Out 2645         Minutes 30              This note to serve as OT d/c summary if pt is d/c-ed from hospital prior to next OT session.       Chao Isbell, [address]

## 2022-01-25 NOTE — CONSULTS
[address] [place] [address] 16                                  CONSULTATION    PATIENT NAME: Soham Maki                    :        1945  MED REC NO:   [id number]                          ROOM:       [id number]  ACCOUNT NO:   [de-identified]                           ADMIT DATE: 2022  PROVIDER:     Arlene Ross MD    HEMATOLOGY CONSULTATION    CONSULT DATE:  2022    REASON FOR CONSULTATION:  Anemia. CONSULTING PROVIDER:  Jenni Haque MD    HISTORY OF PRESENT ILLNESS:  The patient is a pleasant 68-year-old  gentleman with no significant medical history who presented to the  hospital on  with a two-week history of progressive fevers and  chills and increasing shortness of breath and generalized weakness and  nonproductive cough. He was diagnosed with COVID-19 pneumonia. His  hemoglobin on admission was 7.8 with a high MCV. His hemoglobin did  drop throughout the course of the admission and did require 1 unit of  blood yesterday. His iron studies are consistent with chronic  inflammation. He is guaiac negative. He denies any melena or  hematochezia. He has not seen a doctor in quite some time. PAST MEDICAL HISTORY:  None. PAST SURGICAL HISTORY:  None. ALLERGIES:  He has no known drug allergies. MEDICATIONS:  Lovenox 40 mg subcutaneous daily, Lasix 40 mg p.o. daily. SOCIAL HISTORY:  Single. He has no children. He does not drink or  smoke. He is retired. FAMILY HISTORY:  There are no blood disorders or cancers that he is  aware of. REVIEW OF SYSTEMS:  He denies any fevers or chills or sweats over the  previous few days. He still has shortness of breath and dyspnea on  exertion and generalized weakness.   He denies any chest pain, headaches,  any new bone aches, dysphagia, odynophagia, diarrhea, constipation,  hemoptysis, hematemesis, change in vision/hearing/smell/taste,  neuropathy, skin rashes, productive cough, urinary or bowel prolapse or  incontinence, petechiae, purpura, skin rashes, pruritus, hallucinations,  nasal congestion or drainage, seizure, stroke, syncope, depression,  anxiety, suicidal ideations, melena, or hematochezia. His 10-system  review of systems is otherwise negative. PHYSICAL EXAMINATION:  VITAL SIGNS:  He is afebrile with normal vital signs. GENERAL:  He is in no acute distress. HEENT:  His pupils are round and reactive to light and accommodation. Extraocular muscles are intact. NECK:  He has no jugular venous distention. No thyromegaly. His  oropharynx is clear. He has no carotid bruits. He has no palpable  lymphadenopathy. HEART:  Regular rate and rhythm. LUNGS:  Clear to auscultation bilaterally. ABDOMEN:  Nondistended, nontender with bowel sounds x4. No  hepatosplenomegaly. EXTREMITIES:  He has no peripheral clubbing, cyanosis, or edema. NEUROLOGIC EXAM:  Significant for generalized weakness. LABORATORY DATA:  His white blood cell count is 4.6, hemoglobin 8.3,  platelets of 430. ASSESSMENT:  Macrocytic anemia. He was anemic on admission with a very  high MCV. His iron studies are consistent with chronic inflammation. I  will check a haptoglobin to rule out hemolysis. I will check for B12  and folate deficiency. I will check and SPEP to rule out myeloma. In  light of his increased MCV, he could certainly have MDS. If his  laboratory workup is negative, I will consider bone marrow biopsy. Certainly, COVID can cause bone marrow suppression which could be a  contributing factor as well. He is not on any medications that can  cause anemia and does not have hepatosplenomegaly on examination. Thank you for the consultation. I will follow closely.         Kell Sylvester MD    D: 01/24/2022 23:02:46       T: 01/25/2022 1:38:23     [email or web address]  Job#: [id number]     Doc#: [id number]    CC:

## 2022-01-25 NOTE — PROGRESS NOTES
Hematology Oncology Daily Progress Note    Admit Date: 1/14/2022  Hospital day several    Subjective:     Patient has complaints of stable weakness and SOB--denies CP. Medication side effects: none    Scheduled Meds:   enoxaparin  30 mg SubCUTAneous Nightly    furosemide  40 mg Oral Daily    sodium chloride flush  10 mL IntraVENous 2 times per day     Continuous Infusions:   sodium chloride      sodium chloride      sodium chloride 25 mL (01/18/22 2103)     PRN Meds:sodium chloride, diphenhydrAMINE, benzocaine-menthol, sodium chloride, sodium chloride flush, sodium chloride, potassium chloride **OR** potassium alternative oral replacement **OR** potassium chloride, potassium chloride, magnesium sulfate, promethazine **OR** ondansetron, magnesium hydroxide, acetaminophen **OR** acetaminophen    Review of Systems  Pertinent items are noted in HPI. REVIEW OF SYSTEMS:         · Constitutional: Denies fever, sweats, weight loss     · Eyes: No visual changes or diplopia. No scleral icterus. · ENT: No Headaches, hearing loss or vertigo. No mouth sores or sore throat. · Cardiovascular: No chest pain, dyspnea on exertion, palpitations or loss of consciousness. · Respiratory: No cough or wheezing, no sputum production. No hemoptysis. .    · Gastrointestinal: No abdominal pain, appetite loss, blood in stools. No change in bowel habits. · Genitourinary: No dysuria, trouble voiding, or hematuria. · Musculoskeletal:  Generalized weakness. No joint complaints. · Integumentary: No rash or pruritis. · Neurological: No headache, diplopia. No change in gait, balance, or coordination. No paresthesias. · Endocrine: No temperature intolerance. No excessive thirst, fluid intake, or urination. · Hematologic/Lymphatic: No abnormal bruising or ecchymoses, blood clots or swollen lymph nodes. · Allergic/Immunologic: No nasal congestion or hives.    ·     Objective:     Patient Vitals for the past 8 hrs:   BP Temp Temp src Pulse Resp SpO2 Height Weight   01/25/22 0845 -- -- -- -- -- 91 % -- --   01/25/22 0815 -- -- -- -- -- -- 6' 2\" (1.88 m) --   01/25/22 0803 106/60 97.7 °F (36.5 °C) Oral 91 18 95 % -- --   01/25/22 0437 (!) 108/56 97.3 °F (36.3 °C) Oral 95 20 90 % -- 143 lb 8.3 oz (65.1 kg)     I/O last 3 completed shifts:   In: 240 [P.O.:240]  Out: 2150 [Urine:2150]  I/O this shift:  In: 240 [P.O.:240]  Out: 50 [Urine:50]    /60   Pulse 91   Temp 97.7 °F (36.5 °C) (Oral)   Resp 18   Ht 6' 2\" (1.88 m)   Wt 143 lb 8.3 oz (65.1 kg)   SpO2 91%   BMI 18.43 kg/m²     General Appearance:    Alert, cooperative, no distress, appears stated age   Head:    Normocephalic, without obvious abnormality, atraumatic   Eyes:    PERRL, conjunctiva/corneas clear, EOM's intact, fundi     benign, both eyes        Ears:    Normal TM's and external ear canals, both ears   Nose:   Nares normal, septum midline, mucosa normal, no drainage    or sinus tenderness   Throat:   Lips, mucosa, and tongue normal; teeth and gums normal   Neck:   Supple, symmetrical, trachea midline, no adenopathy;        thyroid:  No enlargement/tenderness/nodules; no carotid    bruit or JVD   Back:     Symmetric, no curvature, ROM normal, no CVA tenderness   Lungs:     Clear to auscultation bilaterally, respirations unlabored   Chest wall:    No tenderness or deformity   Heart:    Regular rate and rhythm, S1 and S2 normal, no murmur, rub   or gallop   Abdomen:     Soft, non-tender, bowel sounds active all four quadrants,     no masses, no organomegaly           Extremities:   Extremities normal, atraumatic, no cyanosis or edema   Pulses:   2+ and symmetric all extremities   Skin:   Skin color, texture, turgor normal, no rashes or lesions   Lymph nodes:   Cervical, supraclavicular, and axillary nodes normal   Neurologic:   Stable       Data Review  CBC:   Lab Results   Component Value Date    WBC 5.4 01/25/2022    RBC 2.50 01/25/2022       Assessment:     Active Problems:    Hypoxia  Resolved Problems:    * No resolved hospital problems. *      Plan:     1. COVID pneumonia--per primary team    2. Macrocytic anemia. Labs sent. Iron studies consistent with chronic inflammation. This is partially due to bone marrow suppression from COVID. He may have MDS as well in light of his increased MCV. He is refusing a bone marrow biopsy for now. He feels too weak and too SOB to leave floor. Will revisit tomorrow. It can also be done as an outpt if necessary.         Electronically signed by Derick Ness MD on 1/25/2022 at 10:23 AM

## 2022-01-25 NOTE — PLAN OF CARE
Problem: Falls - Risk of:  Goal: Will remain free from falls  Description: Will remain free from falls  Outcome: Ongoing  Note: Patient educated on fall prevention. Call light is within reach, bed locked in lowest position, personal items within reach, and bed alarm is on. Will round on patient per unit guidelines. Goal: Absence of physical injury  Description: Absence of physical injury  Outcome: Ongoing  Note: Pt assessed for fall risk and fall precautions put into place. Bed in lowest position and wheels locked, call light within reach. Nonskid footwear in place. Patient educated on appropriate method of transfer and to call for assistance. Problem: Airway Clearance - Ineffective  Goal: Achieve or maintain patent airway  Outcome: Ongoing  Note: Will continue to monitor for patent airway     Problem: Gas Exchange - Impaired  Goal: Absence of hypoxia  Outcome: Ongoing  Note: Will continue to monitor for hypoxia   Goal: Promote optimal lung function  Outcome: Ongoing  Note: Will continue to promote and monitor for optimal lung function     Problem: Breathing Pattern - Ineffective  Goal: Ability to achieve and maintain a regular respiratory rate  Outcome: Ongoing  Note: Will continue to monitor respiratory rate      Problem:  Body Temperature -  Risk of, Imbalanced  Goal: Ability to maintain a body temperature within defined limits  Outcome: Ongoing  Note: Will continue to monitor patient's body temperature   Goal: Will regain or maintain usual level of consciousness  Outcome: Ongoing  Note: Will continue to monitor patients level of consciousness   Goal: Complications related to the disease process, condition or treatment will be avoided or minimized  Outcome: Ongoing     Problem: Isolation Precautions - Risk of Spread of Infection  Goal: Prevent transmission of infection  Outcome: Ongoing     Problem: Nutrition Deficits  Goal: Optimize nutritional status  Outcome: Ongoing  Note: Educated patient on importance of proper nutrition. Intake is being recorded to ensure optimal nutrition. Will consult dietitian as needed. Problem: Risk for Fluid Volume Deficit  Goal: Maintain normal heart rhythm  Outcome: Ongoing  Note: Will continue to monitor patient's heart rhythm   Goal: Maintain absence of muscle cramping  Outcome: Ongoing  Note: Will continue to monitor for muscle cramping   Goal: Maintain normal serum potassium, sodium, calcium, phosphorus, and pH  Outcome: Ongoing  Note: Will continue to monitor electrolytes      Problem: Loneliness or Risk for Loneliness  Goal: Demonstrate positive use of time alone when socialization is not possible  Outcome: Ongoing     Problem: Fatigue  Goal: Verbalize increase energy and improved vitality  Outcome: Ongoing  Note: Will continue to monitor patient's level of energy      Problem: Patient Education: Go to Patient Education Activity  Goal: Patient/Family Education  Outcome: Ongoing  Note: Will continue to educate patient     Problem: Skin Integrity:  Goal: Will show no infection signs and symptoms  Description: Will show no infection signs and symptoms  Outcome: Ongoing  Note: No increased swelling, redness, or warmth noted in patient's surgical site. Educated patient on signs and symptoms of infection. Will continue to monitor. Goal: Absence of new skin breakdown  Description: Absence of new skin breakdown  Outcome: Ongoing  Note: Will monitor skin and mucous members. Will turn patient every 2 hours, monitor for friction and sheering, and change dressings as needed. Will preform skin assessment every shift. Problem: Pain:  Goal: Pain level will decrease  Description: Pain level will decrease  Outcome: Ongoing  Note: Educated patient on pain management. Will assess patients pain level per unit protocol, and provide pain management measures as needed.         Goal: Control of acute pain  Description: Control of acute pain  Outcome: Ongoing  Note: Patient educated on acute pain. Taught patient to use call light to ask for pain medication. PRN pain medication given for acute pain. Will continue to monitor pain per unit protocol. Goal: Control of chronic pain  Description: Control of chronic pain  Outcome: Ongoing  Note: Patient educated on chronic pain. Taught patient to use call light to ask for pain medication. Will continue to monitor pain per unit protocol. Goal: Patient's pain/discomfort is manageable  Description: Patient's pain/discomfort is manageable  Outcome: Ongoing  Note: Educated patient on pain management. Will assess patients pain level per unit protocol, and provide pain management measures as needed. Problem: Nutrition  Goal: Optimal nutrition therapy  Outcome: Ongoing  Note: Educated patient on importance of proper nutrition. Intake is being recorded to ensure optimal nutrition. Will consult dietitian as needed. Problem: Infection:  Goal: Will remain free from infection  Description: Will remain free from infection  Outcome: Ongoing  Note: No increased swelling, redness, or warmth noted in patient's surgical site. Educated patient on signs and symptoms of infection. Will continue to monitor. Problem: Safety:  Goal: Free from accidental physical injury  Description: Free from accidental physical injury  Outcome: Ongoing  Note: Pt assessed for fall risk and fall precautions put into place. Bed in lowest position and wheels locked, call light within reach. Nonskid footwear in place. Patient educated on appropriate method of transfer and to call for assistance.      Goal: Free from intentional harm  Description: Free from intentional harm  Outcome: Ongoing  Note: Will continue to monitor patient for signs of intentional harm      Problem: Daily Care:  Goal: Daily care needs are met  Description: Daily care needs are met  Outcome: Ongoing     Problem: Skin Integrity:  Goal: Skin integrity will stabilize  Description: Skin integrity will stabilize  Outcome: Ongoing  Note: Will monitor skin and mucous members. Will turn patient every 2 hours, monitor for friction and sheering, and change dressings as needed. Will preform skin assessment every shift.          Problem: Discharge Planning:  Goal: Patients continuum of care needs are met  Description: Patients continuum of care needs are met  Outcome: Ongoing

## 2022-01-25 NOTE — PROGRESS NOTES
RD did not conduct direct, in-person nutrition evaluation in efforts to reduce exposure and use of PPE for high risk persons, PUI persons, patients who have tested positive for Covid-19 or those awaiting respiratory panel results. EMR was screened for nutrition risk factors, as defined per nutrition standards of care. Comprehensive Nutrition Assessment    Type and Reason for Visit:  Reassess    Nutrition Recommendations/Plan:   Continue Regular diet. Continue Ensure TID. Nutrition Assessment:  Follow-up. Pt remains in isolation for COVID+. Currently on 5 L NC. PO intakes slightly improving, 25-75% of meals. ONS on board but only 1 intakes noted >50%. Will continue current nutrition interventions. Malnutrition Assessment:  Malnutrition Status:  Insufficient data      Estimated Daily Nutrient Needs:  Energy (kcal):  1786-1095 kcal (28-30 kcal/kg ABW)  Protein (g):  68-82 gm (1-1.2 gm/kg ABW); Fluid (ml/day):     1 ml/kcal      Nutrition Related Findings:  BM 1/24; +2 BLE edema      Wounds:  None       Current Nutrition Therapies:    ADULT DIET;  Regular  ADULT ORAL NUTRITION SUPPLEMENT; Breakfast, Lunch, Dinner; Standard High Calorie/High Protein Oral Supplement    Anthropometric Measures:  · Height: 6' 2\" (188 cm)  · Current Body Weight: 143 lb (64.9 kg)   · Admission Body Weight: 162 lb (73.5 kg)    · Ideal Body Weight: 190 lbs; % Ideal Body Weight 75.3 %   · BMI: 18.4  · BMI Categories: Underweight (BMI less than 22) age over 72       Nutrition Diagnosis:   · Inadequate oral intake related to impaired respiratory function as evidenced by intake 26-50%,intake 51-75%    Nutrition Interventions:   Food and/or Nutrient Delivery:  Continue Current Diet,Continue Oral Nutrition Supplement  Nutrition Education/Counseling:  No recommendation at this time   Coordination of Nutrition Care:  Continue to monitor while inpatient    Goals:  Consume greater than 50% of meals and supplements this admission Nutrition Monitoring and Evaluation:   Food/Nutrient Intake Outcomes:  Food and Nutrient Intake,Supplement Intake  Physical Signs/Symptoms Outcomes:  Biochemical Data,Nutrition Focused Physical Findings,Skin,Weight,Hemodynamic Status     Discharge Planning:     Too soon to determine     Electronically signed by Kuldeep Dee RD, LD on 1/25/22 at 8:17 AM EST    Contact: [phone]

## 2022-01-25 NOTE — CONSULTS
Hematology Consult    See dictation    A/P:  1. Macrocytic anemia. Will check labs. If normal, will strongly consider BM biopsy. Although COVID may have suppressed the bone marrow a bit, he may have MDS. Thanks for the consult. Will follow closely.     Sean Dye MD

## 2022-01-25 NOTE — PROGRESS NOTES
Hospitalist Progress Note      PCP: No primary care provider on file. Date of Admission: 1/14/2022    Chief Complaint: SOB/ fatigue. Hospital Course:    64-year-old male without significant past medical history who presented to hospital for fevers chills for about 2 weeks.  Patient has been feeling tired fatigue and having generalized body aches for 2 weeks, he also mentions he has cough, nonproductive.  Denied chest pain nausea vomiting diarrhea constipation. Admitted for COVID pneumonia. 1/20 worsening oxygen requirements, placed on vapotherm. Subjective:   Denies dionna new complaints. Patient weaned down to 2 liters ( from 6 ). Medications:  Reviewed    Infusion Medications    sodium chloride      sodium chloride      sodium chloride 25 mL (01/18/22 2103)     Scheduled Medications    enoxaparin  30 mg SubCUTAneous Nightly    furosemide  40 mg Oral Daily    sodium chloride flush  10 mL IntraVENous 2 times per day     PRN Meds: sodium chloride, diphenhydrAMINE, benzocaine-menthol, sodium chloride, sodium chloride flush, sodium chloride, potassium chloride **OR** potassium alternative oral replacement **OR** potassium chloride, potassium chloride, magnesium sulfate, promethazine **OR** ondansetron, magnesium hydroxide, acetaminophen **OR** acetaminophen      Intake/Output Summary (Last 24 hours) at 1/25/2022 0857  Last data filed at 1/25/2022 0809  Gross per 24 hour   Intake 240 ml   Output 2200 ml   Net -1960 ml       Physical Exam Performed:    /60   Pulse 91   Temp 97.7 °F (36.5 °C) (Oral)   Resp 18   Ht 6' 2\" (1.88 m)   Wt 143 lb 8.3 oz (65.1 kg)   SpO2 91%   BMI 18.43 kg/m²     General appearance: No apparent distress, appears stated age and cooperative. on NC.   HEENT: Pupils equal, round, and reactive to light. Conjunctivae/corneas clear. Neck: Supple, with full range of motion. No jugular venous distention. Trachea midline. Respiratory:  Normal respiratory effort. Clear to auscultation, bilaterally without Rales/Wheezes/Rhonchi. Cardiovascular: Regular rate and rhythm with normal S1/S2 without murmurs, rubs or gallops. Abdomen: Soft, non-tender, non-distended with normal bowel sounds. Musculoskeletal: No clubbing, cyanosis or edema bilaterally. Full range of motion without deformity. Skin: Skin color, texture, turgor normal.  No rashes or lesions. Neurologic:  Neurovascularly intact without any focal sensory/motor deficits. Cranial nerves: II-XII intact, grossly non-focal.sensation intact. Patient reports some tingling in the right hand. Psychiatric: Alert and oriented, thought content appropriate, normal insight  Capillary Refill: Brisk,3 seconds, normal   Peripheral Pulses: +2 palpable, equal bilaterally       Labs:   Recent Labs     01/23/22  0611 01/23/22  0611 01/23/22  1419 01/24/22  0523 01/25/22  0517   WBC 5.9  --   --  4.6 5.4   HGB 6.4*   < > 7.8* 8.3* 8.9*   HCT 18.6*  18.5*   < > 23.1* 24.3* 26.1*     --   --  179 184    < > = values in this interval not displayed. Recent Labs     01/23/22  0611 01/24/22  0523 01/25/22  0517    137 137   K 4.4 4.8 4.7   CL 98* 99 99   CO2 31 30 28   BUN 31* 21* 27*   CREATININE <0.5* 0.5* 0.5*   CALCIUM 7.8* 8.0* 8.3     Recent Labs     01/23/22  0611 01/24/22  0523 01/25/22  0517   AST 13* 16 19   ALT 17 20 26   BILIDIR <0.2 0.3 0.3   BILITOT 0.6 0.7 0.9   ALKPHOS 99 104 109     No results for input(s): INR in the last 72 hours. No results for input(s): Sonjia Lo in the last 72 hours. Urinalysis:      Lab Results   Component Value Date    NITRU Negative 01/17/2022    WBCUA 0 01/17/2022    RBCUA 1 01/17/2022    BLOODU Negative 01/17/2022    SPECGRAV 1.013 01/17/2022    GLUCOSEU Negative 01/17/2022       Radiology:  XR CHEST PORTABLE   Final Result   Borderline cardiomegaly and moderate central pulmonary congestion.       Hazy ground-glass opacities along the perihilar regions which is more prominent and could represent pulmonary edema and/or developing pneumonia. XR CHEST 1 VIEW   Final Result   Patchy infiltrates within both lungs, most compatible with multifocal   pneumonia, especially COVID-19 pneumonia. All in all, findings are similar   when compared to the previous exam from yesterday. XR CHEST PORTABLE   Final Result   Ground-glass and interstitial opacities, indeterminate for pulmonary edema or   atypical pneumonia, including COVID. Minimal consolidation in the left base, asymmetrical airspace disease or   atelectasis. Assessment/Plan:    Active Hospital Problems    Diagnosis     Hypoxia [R09.02]      Acute hypoxic respiratory failure. Covid 19 Pneumonia. Sepsis. Patient was admitted with COVID-pneumonia, was initially on  on 15 L via nasal cannula. S/p Actemra 1/16. Procalcitonin 0.98--> 0.24 ( completed course of ceftriaxone /azithromycin). worsening oxygen requirements, started on HFNC 1/20. Continues to in HFNC ( slightly improved). CXR 1/21 with no changes. Completed 10 days of dexamethasone ( completed 10/24). Oxygen weaned down to 2 liters of oxygen. Plan.  -Wean down oxygen as tolerated. -Incentive spirometry. Acute blood loss anemia. No active signs of bleeding, received 2 unit of PRBC since admission  Hb today is 8.9. Iron studies normal, likely bone marrow suppression due to COVID  Occult blood negative. retic 0.54 , retic index indicating Hypoproliferation. Plan.  -Continue to monitor hemoglobin. - appreciate hematology input. Right upper limb numbness ( stable)  Seems positional, none dermatomal with no decrease in sensation. Will continue to follow symptoms. Elevated troponin likely demand ischemia  Resolved. Social.   Patient continues to be full code with no limitations in care     DVT Prophylaxis:lovenox  Diet: ADULT DIET;  Regular  ADULT ORAL NUTRITION SUPPLEMENT; Breakfast, Lunch, Dinner; Standard High Calorie/High Protein Oral Supplement  Code Status: Full Code    PT/OT Eval Status: following    Dispo - ECF ( if oxygen continues to be stable in 2-3 days).        Erasmo Ceja MD

## 2022-01-25 NOTE — CARE COORDINATION
Discharge Planning:   PT/OT recommending 3-5 sessions per week,Patient would benefit from continued therapy after discharge. Called to patient's room due to COVID isolation to discuss discharge plan. Patient did not answer at this time. Medicare COVID SNF list provided to RN to give to patient. The Plan for Transition of Care is related to the following treatment goals: SNF     The Patient  was provided with a choice of provider and agrees   with the discharge plan. [x] Yes [] No    Freedom of choice list was provided with basic dialogue that supports the patient's individualized plan of care/goals, treatment preferences and shares the quality data associated with the providers.  [x] Yes [] No  Yancey Crigler, MSW, ZEKE, Social Work/Case Management   [phone]  Electronically signed by Yancey Crigler, MSW, LSW on 1/25/2022 at 10:05 AM

## 2022-01-25 NOTE — PROGRESS NOTES
Patient sitting up in bed watching tv. Patient is alert and oriented. Follows commands. No complaints of pain. Patient is currently on 5L high flow nasal cannula with humidity. Patient expressed distress to RN about how long it is taking to wean oxygen. He stated \"It feels like I am never going to get better. \" RN listened and offered emotional support to patient. Patient states that he does not wear any oxygen at home. Patient coughed up a moderate amount of thick yellowish white phlegm. Patient encouraged to prone, use acapella and incentive spirometer as much as possible, patient verbalized understanding. Morning medication given whole with a sip of water, tolerated well. Bed locked, bed alarm on. Call light within reach. Will continue to monitor.      Electronically signed by Malou Shen RN on 1/25/2022 at 9:10 AM

## 2022-01-26 NOTE — PROGRESS NOTES
Occupational Therapy  Facility/Department: [address] PROGRESSIVE CARE  Daily Treatment Note  NAME: Jeni Baptiste  : 1945  MRN: [id number]    Date of Service: 2022    Discharge Recommendations:  Continue to assess pending progress,Patient would benefit from continued therapy after discharge,3-5 sessions per week       Assessment   Performance deficits / Impairments: Decreased functional mobility ; Decreased ADL status; Decreased balance;Decreased strength;Decreased endurance;Decreased high-level IADLs  Assessment: Pt limited this date due to fatigue. Required encouragement to eat but did not eat any food. With max encouragement took two drinks of Ensure. Pt required min assist to stand from the recliner due to fatigue. He completed stand pivot to the bed. He required mod assist for sit-supine. Pt limited this date due to fatigue and increased weakness. Pt will require continued therapy at low-moderate frequency after d/c. Prognosis: Fair;Good  Patient Education: Importance of nutrition. REQUIRES OT FOLLOW UP: Yes  Activity Tolerance  Activity Tolerance: Patient limited by fatigue  Safety Devices  Type of devices: Call light within reach; Bed alarm in place; Left in bed;Gait belt         Patient Diagnosis(es): The primary encounter diagnosis was COVID-19. Diagnoses of Generalized weakness, Acute respiratory failure with hypoxia ([address] and Atypical pneumonia were also pertinent to this visit. has no past medical history on file. has no past surgical history on file. Restrictions  Restrictions/Precautions  Restrictions/Precautions: Fall Risk,Isolation  Position Activity Restriction  Other position/activity restrictions: Droplet Plus : COVID +. O2 8L oxygen.   Subjective   General  Chart Reviewed: Yes,Progress Notes  Patient assessed for rehabilitation services?: Yes  Additional Pertinent Hx: per MD note, Patient is a 78-year-old male without significant past medical history who presented to hospital for fevers chills for about 2 weeks. Patient has been feeling tired fatigue and having generalized body aches for 2 weeks, he also mentions he has cough, nonproductive. Denied chest pain nausea vomiting diarrhea constipation. Family / Caregiver Present: No  Referring Practitioner: Carlos Eduardo Booth MD  Subjective  Subjective: Pt seen bedside. Pt sitting in recliner and sleeping when OT entered the room. General Comment  Comments: okay for therapy per RN. Objective    ADL  Feeding: Setup;Verbal cueing (Made multiple attempts to assist pt eat. Pt declining any food. Pt eventually agreed to try ENsure. Pt took 2 drinks of ENsure. Pt able to  bottle and bring to his mouth.)        Functional Mobility  Functional - Mobility Device: Rolling Walker  Activity: Other (pivot transfer to the bed)  Assist Level: Contact guard assistance  Wheelchair Bed Transfers  Wheelchair/Bed - Technique: Stand pivot  Equipment Used: Bed  Level of Asssistance: Minimal assistance  Bed mobility  Sit to Supine: Moderate assistance (Assist with both legs. )  Transfers  Sit to stand: Minimal assistance  Stand to sit: Minimal assistance  Transfer Comments: Required assist due to fatigue                                                                 Plan   Plan  Times per week: 3-5x  Current Treatment Recommendations: Strengthening,Functional Mobility Training,Gait Training,Endurance Training,Balance Training,Safety Education & Training,Self-Care / ADL,Equipment Evaluation, Education, & procurement,Patient/Caregiver Education & Training    AM-PAC Score        AM-[facility] Inpatient Daily Activity Raw Score: 16 (01/18/22 1204)  AM-PAC Inpatient ADL T-Scale Score : 35.96 (01/18/22 1204)  ADL Inpatient CMS 0-100% Score: 53.32 (01/18/22 1204)  ADL Inpatient CMS G-Code Modifier : CK (01/18/22 1204)    Goals  Short term goals  Time Frame for Short term goals: prior to D/C  Short term goal 1: complete functional mobility and transfers Mod Ind  Short term goal 2: complete bathing and dressing Mod Ind  Short term goal 3: complete toileting Mod Ind  Short term goal 4: complete grooming in stance at sink Mod Ind  Long term goals  Time Frame for Long term goals : STG=LTG  Patient Goals   Patient goals : no stated goals       Therapy Time   Individual Concurrent Group Co-treatment   Time In 1310         Time Out 1350         Minutes 40              This note to serve as OT d/c summary if pt is d/c-ed from hospital prior to next OT session.       Holli Wilson, [address]

## 2022-01-26 NOTE — PROGRESS NOTES
Physical Therapy  Facility/Department: [address] PROGRESSIVE CARE  Daily Treatment Note  NAME: Kulwinder Collado  : 1945  MRN: [id number]    Date of Service: 2022    Discharge Recommendations:  3-5 sessions per week,Patient would benefit from continued therapy after discharge        Assessment   Body structures, Functions, Activity limitations: Decreased functional mobility ; Decreased strength;Decreased endurance;Decreased balance  Assessment: With encouragement, pt able to tolerate transfer to bedside chair. O2 increased from 8 L. to 12 L. for activity, and pt maintained sats in mid 90s throughout. Fatigue limited activity tolerance, but he did well to participate. Continue to recommend low-moderate frequency therapy upon D/C. Kulwinder Collado scored a  on the AM-PAC short mobility form. Current research shows that an AM-PAC score of 17 or less is typically not associated with a discharge to the patient's home setting. Based on the patient's AM-PAC score and their current functional mobility deficits, it is recommended that the patient have 3-5 sessions per week of Physical Therapy at d/c to increase the patient's independence. Please see assessment section for further patient specific details. If patient discharges prior to next session this note will serve as a discharge summary. Please see below for the latest assessment towards goals. PT Education: PT Role;Gait Training;Functional Mobility Training;Plan of Care;General Safety; Energy Conservation;Transfer Training  REQUIRES PT FOLLOW UP: Yes  Activity Tolerance  Activity Tolerance: Patient limited by endurance     Patient Diagnosis(es): The primary encounter diagnosis was COVID-19. Diagnoses of Generalized weakness, Acute respiratory failure with hypoxia ([address] and Atypical pneumonia were also pertinent to this visit. has no past medical history on file.    has no past surgical history on file.    Restrictions  Restrictions/Precautions  Restrictions/Precautions: Fall Risk,Isolation  Position Activity Restriction  Other position/activity restrictions: Droplet Plus : COVID +. O2 8L oxygen. (increased to 12 L. for activity)     Subjective   General  Chart Reviewed: Yes  Additional Pertinent Hx: 65 y/o male admit 1/14/2022 with Acute Respiratory, Pneumonia, COVID+. Reports generalized weakness; worsening over past 2 wks (inability to get up to go to bathroom). PMH insign otherwise. Subjective  Subjective: Pt reports fatigue. Agreeable to mobilize with encouragement. Orientation  Orientation  Overall Orientation Status: Within Functional Limits    Objective      Bed mobility  Supine to Sit: Moderate assistance     Transfers  Sit to Stand: Moderate Assistance  Stand to sit: Moderate Assistance  Stand Pivot Transfers: Moderate Assistance (With walker)     Balance  Comments: Static stand x 1 min. with walker support (2nd trial) while PT performed total assist for puneet-care (small amount of BM). Other Activities: Other (see comment)  Comment: After transfer to chair, pt was positioned for comfort, breakfast tray placed. O2 gradually weaned back to 8 L. from 12 L. Pt in mid 80s. AM-PAC Score  AM-PAC Inpatient Mobility Raw Score : 11 (01/26/22 0847)  AM-PAC Inpatient T-Scale Score : 33.86 (01/26/22 08)  Mobility Inpatient CMS 0-100% Score: 72.57 (01/26/22 08)  Mobility Inpatient CMS G-Code Modifier : CL (01/26/22 4317)          Goals  Short term goals  Time Frame for Short term goals: Upon d/c acute care setting. Short term goal 1: Bed Mob SBA. Short term goal 2: Transfers with/without assist device CGA. Short term goal 3: Amb with/without assist device 22' CGA; sats remain at least high 80's/low 90's. Short term goal 4: Pt participating in approp Strength Exs. Patient Goals   Patient goals : Get stronger and return home.     Plan    Plan  Times per week: 3-5x week while in acute care setting. Current Treatment Recommendations: Strengthening,Functional Mobility Training,Transfer Training,Gait Training,Safety Education & Training,Patient/Caregiver Education & Training  Safety Devices  Type of devices:  All fall risk precautions in place,Call light within reach,Gait belt,Left in chair,Nurse notified  Restraints  Initially in place: No     Therapy Time   Individual Concurrent Group Co-treatment   Time In 0820         Time Out 7683         Minutes 23         Timed Code Treatment Minutes: [address] Cancer, PT  Electronically signed by Cynthia Cancer, [place] [id number] on 1/26/2022 at 8:52 AM

## 2022-01-26 NOTE — PROGRESS NOTES
SpO2 Weight   01/26/22 0451 -- -- -- -- -- -- 145 lb 4.5 oz (65.9 kg)   01/26/22 0336 122/70 97.8 °F (36.6 °C) Oral 97 18 90 % --     I/O last 3 completed shifts: In: 5 [P.O.:710; I.V.:10]  Out: 2250 [Urine:2250]  No intake/output data recorded. /70   Pulse 97   Temp 97.8 °F (36.6 °C) (Oral)   Resp 18   Ht 6' 2\" (1.88 m)   Wt 145 lb 4.5 oz (65.9 kg)   SpO2 90%   BMI 18.65 kg/m²     General Appearance:    Alert, cooperative, no distress, appears stated age   Head:    Normocephalic, without obvious abnormality, atraumatic   Eyes:    PERRL, conjunctiva/corneas clear, EOM's intact, fundi     benign, both eyes        Ears:    Normal TM's and external ear canals, both ears   Nose:   Nares normal, septum midline, mucosa normal, no drainage    or sinus tenderness   Throat:   Lips, mucosa, and tongue normal; teeth and gums normal   Neck:   Supple, symmetrical, trachea midline, no adenopathy;        thyroid:  No enlargement/tenderness/nodules; no carotid    bruit or JVD   Back:     Symmetric, no curvature, ROM normal, no CVA tenderness   Lungs:     Clear to auscultation bilaterally, respirations unlabored   Chest wall:    No tenderness or deformity   Heart:    Regular rate and rhythm, S1 and S2 normal, no murmur, rub   or gallop   Abdomen:     Soft, non-tender, bowel sounds active all four quadrants,     no masses, no organomegaly           Extremities:   Extremities normal, atraumatic, no cyanosis or edema   Pulses:   2+ and symmetric all extremities   Skin:   Skin color, texture, turgor normal, no rashes or lesions   Lymph nodes:   Cervical, supraclavicular, and axillary nodes normal   Neurologic:   Stable         Data Review  CBC:   Lab Results   Component Value Date    WBC 4.6 01/26/2022    RBC 2.54 01/26/2022       Assessment:     Active Problems:    Hypoxia  Resolved Problems:    * No resolved hospital problems. *      Plan:     1. Macrocytic anemia.   His iron studies are consistent with chronic inflammation. His other labs are normal so far. This is likely multifactorial and due to bone marrow suppression from Covid. In light of his increased MCV, he could very well have myelodysplastic syndrome as well. He is refusing a bone marrow biopsy until he starts feeling better. 2.  Could not Covid pneumonia.   I will defer to the primary team.        Electronically signed by Shane Miller MD on 1/26/2022 at 8:02 AM

## 2022-01-26 NOTE — PROGRESS NOTES
Hospitalist Progress Note      PCP: No primary care provider on file. Date of Admission: 1/14/2022    Chief Complaint: SOB/ fatigue. Hospital Course:    77-year-old male without significant past medical history who presented to hospital for fevers chills for about 2 weeks.  Patient has been feeling tired fatigue and having generalized body aches for 2 weeks, he also mentions he has cough, nonproductive.  Denied chest pain nausea vomiting diarrhea constipation. Admitted for COVID pneumonia. 1/20 worsening oxygen requirements, placed on vapotherm. Subjective:   Denies any new complaints. Patient currently on 4 liters of oxygen via nasal canula. ( 6 liters yesterday). Medications:  Reviewed    Infusion Medications    sodium chloride      sodium chloride      sodium chloride 25 mL (01/18/22 2103)     Scheduled Medications    enoxaparin  30 mg SubCUTAneous Nightly    furosemide  40 mg Oral Daily    sodium chloride flush  10 mL IntraVENous 2 times per day     PRN Meds: sodium chloride, sodium chloride, diphenhydrAMINE, benzocaine-menthol, sodium chloride, sodium chloride flush, sodium chloride, potassium chloride **OR** potassium alternative oral replacement **OR** potassium chloride, potassium chloride, magnesium sulfate, promethazine **OR** ondansetron, magnesium hydroxide, acetaminophen **OR** acetaminophen      Intake/Output Summary (Last 24 hours) at 1/26/2022 1300  Last data filed at 1/26/2022 0425  Gross per 24 hour   Intake 480 ml   Output 1550 ml   Net -1070 ml       Physical Exam Performed:    /67   Pulse 95   Temp 97.5 °F (36.4 °C) (Oral)   Resp 20   Ht 6' 2\" (1.88 m)   Wt 145 lb 4.5 oz (65.9 kg)   SpO2 90%   BMI 18.65 kg/m²     General appearance: No apparent distress, appears stated age and cooperative. on NC.   HEENT: Pupils equal, round, and reactive to light. Conjunctivae/corneas clear. Neck: Supple, with full range of motion. No jugular venous distention. Trachea midline. Respiratory:  Normal respiratory effort. Clear to auscultation, bilaterally without Rales/Wheezes/Rhonchi. Cardiovascular: Regular rate and rhythm with normal S1/S2 without murmurs, rubs or gallops. Abdomen: Soft, non-tender, non-distended with normal bowel sounds. Musculoskeletal: No clubbing, cyanosis or edema bilaterally. Full range of motion without deformity. Skin: Skin color, texture, turgor normal.  No rashes or lesions. Neurologic:  Neurovascularly intact without any focal sensory/motor deficits. Cranial nerves: II-XII intact, grossly non-focal.sensation intact. Patient reports some tingling in the right hand. Psychiatric: Alert and oriented, thought content appropriate, normal insight  Capillary Refill: Brisk,3 seconds, normal   Peripheral Pulses: +2 palpable, equal bilaterally       Labs:   Recent Labs     01/24/22 0523 01/25/22  0517 01/26/22  0516   WBC 4.6 5.4 4.6   HGB 8.3* 8.9* 9.1*   HCT 24.3* 26.1* 26.9*    184 186     Recent Labs     01/24/22  0523 01/25/22  0517 01/26/22  0516    137 137   K 4.8 4.7 4.1   CL 99 99 98*   CO2 30 28 26   BUN 21* 27* 31*   CREATININE 0.5* 0.5* 0.6*   CALCIUM 8.0* 8.3 8.0*     Recent Labs     01/24/22 0523 01/25/22  0517 01/26/22  0516   AST 16 19 17   ALT 20 26 23   BILIDIR 0.3 0.3 0.4*   BILITOT 0.7 0.9 1.0   ALKPHOS 104 109 107     No results for input(s): INR in the last 72 hours. No results for input(s): Skippy Gault in the last 72 hours. Urinalysis:      Lab Results   Component Value Date    NITRU Negative 01/17/2022    WBCUA 0 01/17/2022    RBCUA 1 01/17/2022    BLOODU Negative 01/17/2022    SPECGRAV 1.013 01/17/2022    GLUCOSEU Negative 01/17/2022       Radiology:  XR CHEST PORTABLE   Final Result   Borderline cardiomegaly and moderate central pulmonary congestion.       Hazy ground-glass opacities along the perihilar regions which is more   prominent and could represent pulmonary edema and/or developing pneumonia. XR CHEST 1 VIEW   Final Result   Patchy infiltrates within both lungs, most compatible with multifocal   pneumonia, especially COVID-19 pneumonia. All in all, findings are similar   when compared to the previous exam from yesterday. XR CHEST PORTABLE   Final Result   Ground-glass and interstitial opacities, indeterminate for pulmonary edema or   atypical pneumonia, including COVID. Minimal consolidation in the left base, asymmetrical airspace disease or   atelectasis. Assessment/Plan:    Active Hospital Problems    Diagnosis     Hypoxia [R09.02]      Acute hypoxic respiratory failure. Covid 19 Pneumonia. Sepsis. Patient was admitted with COVID-pneumonia, was initially on  on 15 L via nasal cannula. S/p Actemra 1/16. Procalcitonin 0.98--> 0.24 ( completed course of ceftriaxone /azithromycin). worsening oxygen requirements, started on HFNC 1/20. Continues to in HFNC ( slightly improved). CXR 1/21 with no changes. Completed 10 days of dexamethasone ( completed 10/24). Oxygen weaned down to 4 liters of oxygen. Plan.  -Wean down oxygen as tolerated. -Incentive spirometry. Acute blood loss anemia. No active signs of bleeding, received 2 unit of PRBC since admission  Hb today is 8.9. Iron studies normal, likely bone marrow suppression due to COVID  Occult blood negative. retic 0.54 , retic index indicating Hypoproliferation. Patient was seen by hematology, offered [facility] ( declined). Plan.  -Continue to monitor hemoglobin. - appreciate hematology input. Right upper limb numbness ( stable)  Seems positional, none dermatomal with no decrease in sensation. Will continue to follow symptoms. Elevated troponin likely demand ischemia  Resolved. Social.   Patient continues to be full code with no limitations in care     DVT Prophylaxis:lovenox  Diet: ADULT DIET;  Regular  ADULT ORAL NUTRITION SUPPLEMENT; Breakfast, Lunch, Dinner; Standard High Calorie/High Protein Oral Supplement  Code Status: Full Code    PT/OT Eval Status: following    Dispo - ECF ( if oxygen continues to be stable in 2-3 days).        Ca Macedo MD

## 2022-01-27 NOTE — PLAN OF CARE
Problem: Falls - Risk of:  Goal: Will remain free from falls  Description: Will remain free from falls  Outcome: Ongoing     Problem: Airway Clearance - Ineffective  Goal: Achieve or maintain patent airway  Outcome: Ongoing

## 2022-01-27 NOTE — PROGRESS NOTES
Hospitalist Progress Note      PCP: No primary care provider on file. Date of Admission: 1/14/2022    Chief Complaint: SOB/ fatigue. Hospital Course:    64-year-old male without significant past medical history who presented to hospital for fevers chills for about 2 weeks.  Patient has been feeling tired fatigue and having generalized body aches for 2 weeks, he also mentions he has cough, nonproductive.  Denied chest pain nausea vomiting diarrhea constipation. Admitted for COVID pneumonia. 1/20 worsening oxygen requirements, placed on vapotherm. Subjective:   Oxygen requirements improving, patient is currently on 3 liters of oxygen. Despite improved saturations, patient does seem to be weaker, and stated that he feels \" wiped out\". Medications:  Reviewed    Infusion Medications    sodium chloride      sodium chloride      sodium chloride 25 mL (01/18/22 2103)     Scheduled Medications    enoxaparin  30 mg SubCUTAneous Nightly    furosemide  40 mg Oral Daily    sodium chloride flush  10 mL IntraVENous 2 times per day     PRN Meds: sodium chloride, sodium chloride, diphenhydrAMINE, benzocaine-menthol, sodium chloride, sodium chloride flush, sodium chloride, potassium chloride **OR** potassium alternative oral replacement **OR** potassium chloride, potassium chloride, magnesium sulfate, promethazine **OR** ondansetron, magnesium hydroxide, acetaminophen **OR** acetaminophen      Intake/Output Summary (Last 24 hours) at 1/27/2022 1149  Last data filed at 1/26/2022 1751  Gross per 24 hour   Intake --   Output 600 ml   Net -600 ml       Physical Exam Performed:    /73   Pulse 105   Temp 97.5 °F (36.4 °C) (Oral)   Resp 18   Ht 6' 2\" (1.88 m)   Wt 144 lb 13.5 oz (65.7 kg)   SpO2 92%   BMI 18.60 kg/m²     General appearance: No apparent distress, appears stated age and cooperative. on NC. Looks ill. HEENT: Pupils equal, round, and reactive to light.  Conjunctivae/corneas is more   prominent and could represent pulmonary edema and/or developing pneumonia. XR CHEST 1 VIEW   Final Result   Patchy infiltrates within both lungs, most compatible with multifocal   pneumonia, especially COVID-19 pneumonia. All in all, findings are similar   when compared to the previous exam from yesterday. XR CHEST PORTABLE   Final Result   Ground-glass and interstitial opacities, indeterminate for pulmonary edema or   atypical pneumonia, including COVID. Minimal consolidation in the left base, asymmetrical airspace disease or   atelectasis. Assessment/Plan:    Active Hospital Problems    Diagnosis     Hypoxia [R09.02]      Acute hypoxic respiratory failure. Covid 19 Pneumonia. Sepsis. Patient was admitted with COVID-pneumonia, was initially on  on 15 L via nasal cannula. S/p Actemra 1/16. Procalcitonin 0.98--> 0.24 ( completed course of ceftriaxone /azithromycin). worsening oxygen requirements, started on HFNC 1/20. Continues to in HFNC ( slightly improved). CXR 1/21 with no changes. Completed 10 days of dexamethasone ( completed 10/24). Oxygen weaned down to 3 liters of oxygen. Patient does seem to be getting more weak over the past 2 days. Plan.  -Wean down oxygen as tolerated. -Incentive spirometry. - update procal and CRP. - update CXR .   - SLP ( patient stated the he feels like food gets stuck). Acute blood loss anemia. No active signs of bleeding, received 2 unit of PRBC since admission  Hb today is 9.5  Iron studies normal, likely bone marrow suppression due to COVID  Occult blood negative. retic 0.54 , retic index indicating Hypoproliferation. Patient was seen by hematology, offered [facility] ( declined). Plan.  -Continue to monitor hemoglobin. - appreciate hematology input. Right upper limb numbness ( stable)  Seems positional, none dermatomal with no decrease in sensation. Will continue to follow symptoms. Elevated troponin likely demand ischemia  Resolved. Social.   Patient continues to be full code with no limitations in care     DVT Prophylaxis:lovenox  Diet: ADULT DIET; Regular  ADULT ORAL NUTRITION SUPPLEMENT; Breakfast, Lunch, Dinner; Standard High Calorie/High Protein Oral Supplement  Code Status: Full Code    PT/OT Eval Status: following    Dispo - ECF ( if oxygen continues to be stable in 2-3 days).        Tomas Arndt MD

## 2022-01-27 NOTE — PROGRESS NOTES
Speech Language Pathology  Facility/Department: [address] PROGRESSIVE CARE   CLINICAL BEDSIDE SWALLOW EVALUATION    NAME: Andie Jackson  : 1945  MRN: [id number]    ADMISSION DATE: 2022  ADMITTING DIAGNOSIS: The primary encounter diagnosis was COVID-19. Diagnoses of Generalized weakness, Acute respiratory failure with hypoxia ([address] and Atypical pneumonia were also pertinent to this visit. · covid 19 droplet plus contact precautions  · has Hypoxia on their problem list.  ·  has no past medical history on file. · Baseline : Pt reports weight loss for ~2 months. Pt reports regular diet consistencies as baseline. Pt reportedly lives with niece and her . ONSET DATE: 2022    Date of Eval: 2022  Evaluating Therapist: CELESTE Painter     Chart Review  · MD History and Physical Documentation revealed:   History Of Present Illness:  Patient is a 77-year-old male without significant past medical history who presented to hospital for fevers chills for about 2 weeks. Patient has been feeling tired fatigue and having generalized body aches for 2 weeks, he also mentions he has cough, nonproductive. Denied chest pain nausea vomiting diarrhea constipation. 2022 Chest XR     Minimal improved multifocal pneumonia and or pulmonary edema. · (previous Chest XT 2022 ; 1/15/2022 and 2022 noted)    2022 Hematology Oncology consult    PT/OT and dietary active with Pt    Current Diet level:  Current Diet : Regular  Current Liquid Diet : Thin      Primary Complaint   NSG noted problems with po at meal and requested MD order for Swallowing Assessment.  NSG also reporting decrease in po intake    Reason for Referral  Andie Jackson was referred for a bedside swallow evaluation to assess the efficiency of his swallow function, identify signs and symptoms of aspiration and make recommendations regarding safe dietary consistencies, effective compensatory strategies, and safe eating environment. Assessment Impression  1. Pt was awake in bed. Pt was oriented to self, place, month/year, and partially to situation. Pt was responsive but voice quality weak and hoarse with dysphonia features. Pt with slow rate of processing and encouragement to participate in repositioning in bed. Pt was able to physically assist in more upright positioning in bed. Pt is on O2 at 3L/min O2 via nasal cannula. (chart review indicates gradual improvement from oxygen needs at onset). · Pre feeding Oral Motor Mechanism Exam revealed generalized oral motor muscular weakness; weak voice with dysphonia features; diminished to absent oral reflexes (gag and palatal); weak volitional cough and delayed volitional swallow. 2.  Dysphagia Diagnosis: Mild to moderate oral stage dysphagia;Mild to moderate pharyngeal stage dysphagia characterized by generalized muscular weakness which can impact mastication; bolus control and A-P oral transit; delayed initiation of swallow; reduced laryngeal elevation and potential reduced pharyngeal clearance. · Pt with inconsistent s/s with concern for aspiration with thin liquids and delayed throat clearing post swallow of regular hard solid OR soft dry solid foods. · Pt appeared to better tolerate mildly thick liquids, puree and mech soft moist/bite size food consistencies. Recommend trial downgrade to dysphagia soft/bite size food consistencies; mildly thick liquids; continue oral supplements. SLP dysphagia treatment trial. Anticipate potential need for MBSS to assess pharyngeal phase of the swallow when out of covid 19 contact precautions. Pt does need encouragement. Dietary consult for calorie count. · Pt needs encouragement and very weak; unclear if factors are covid 19; medical co-morbidities; mental status; depression; other etiology  · Discussed with RN    Dysphagia Outcome Severity Scale: Level 4: Mild moderate dysphagia- Intermittent supervision/cueing.  One - two diet consistencies restricted     Treatment Plan  Requires SLP Intervention: Yes  Duration/Frequency of Treatment: 3-5 times while on acute medical floor  D/C Recommendations: To be determined       Recommended Diet and Intervention  Diet Solids Recommendation: Dysphagia Soft and Bite-Sized (Dysphagia III)  Liquid Consistency Recommendation: Mildly Thick (Nectar)  Recommended Form of Meds: Meds in puree     Therapeutic Interventions: Diet tolerance monitoring; Therapeutic PO trials with SLP;Patient/Family education; Bolus control exercises; Laryngeal exercises;[place] Protocol    Compensatory Swallowing Strategies  Compensatory Swallowing Strategies: Upright as possible for all oral intake;Small bites/sips;Swallow 2 times per bite/sip; Remain upright for 30-45 minutes after meals (oral care)    Treatment/Goals   Pt did not identify any goals. Team goal if po diet  Dysphagia Goals:   1. The patient will tolerate dysphagia soft/bite size with  Mildly thick liquids/ nectar thick liquid diet without observed clinical signs of aspiration;  2. The patient/caregiver will demonstrate understanding of compensatory strategies for improved swallowing safety. 3. Pt will tolerate trial lingual/laryngeal/pharyngeal ex 10/10.   4. Pt will tolerate [place] Protocol between meals without accompanying food/med without s/s of aspiration    5. Pt will demonstrate improved bolus prep/manipulation of regular solid food consistencies 10/10 without complaints; oral pocketing    General  Chart Reviewed: Yes  Behavior/Cognition: Alert; Requires cueing (Flat affect. Needed encouragement for repositioning; Verbally responsive but dysphonia features noted. Oriented to self,place, month/year .  Not fully oriented to situation.)  Respiratory Status: O2 via nasal cannula (3L/min O2 via nasal cannula)  Communication Observation:  (vague historian; expresses likes/dislikes)  Follows Directions: Simple (mild cues/encouragement)  Dentition: Poor dental/oral hygiene (limited dentition)  Prior Dysphagia History: Pt denied problems chewing or swallowing prior to onset; pt reports reduced appetite and weight loss    Patient Positioning: Upright in bed (repositioning to ~80 degrees upright)    Consistencies Administered: Reg solid; Dysphagia Soft and Bite-Sized (Dysphagia III); Dysphagia Minced and Moist (Dysphagia II); Dysphagia Pureed (Dysphagia I); Honey - cup;Honey - straw;Nectar - cup;Nectar - straw; Thin - cup; Thin - teaspoon; Thin - straw    Pain: denied    Vision/Hearing  Vision  Vision: Within Functional Limits  Hearing  Hearing: Exceptions to [facility]  Hearing Exceptions: Hard of hearing/hearing concerns    Oral Motor Deficits  Oral/Motor  Oral Motor: Exceptions to [facility]  Labial ROM:  (generalized weakness and reduced rom)  Labial Strength: Reduced  Labial Coordination: Reduced  Lingual ROM:  (fairly midline volitional lingual protrusion/elevation/lateralization)  Lingual Strength: Reduced  Lingual Coordination: Reduced  Velum:  (limited rom overall during phonation of /a/)  Gag:  (diminished to absent)   Vocal Quality: Weak;Hoarse (dysphonia features)  Volitional Cough: Weak  Volitional Swallow: Delayed    Oral Phase Dysfunction  Oral Phase  Oral Phase: Exceptions  Oral Phase Dysfunction  Impaired Mastication: Soft Solid; Reg Solid (prolonged and a times incomplete. Pt spit out all regular solid foods)  Decreased Anterior to Posterior Transit: All (disorganized)  Suspected Premature Bolus Loss:  (suspect across consistencies but appeared most symptomatic with thin liquids)  Lingual/Palatal Residue:  (regular solid; pt spit out oral pocketing)     Indicators of Pharyngeal Phase Dysfunction   Pharyngeal Phase  Pharyngeal Phase: Exceptions  Indicators of Pharyngeal Phase Dysfunction  Delayed Swallow: All  Decreased Laryngeal Elevation:  (suspected reduced laryngeal elevation)  Throat Clearing - Immediate: Thin - cup; Thin - straw  Throat Clearing - Delayed: Reg Solid  Cough - Immediate:  (inconsistent)  Cough - Delayed: Thin - cup; Thin - straw  Pharyngeal Phase   Pharyngeal: Pt appeared to better tolerate thick liquids as compared to thin liquids; and puree and mech soft/soft moist foods better than regular hard solid    Prognosis  Prognosis  Prognosis for safe diet advancement:  (good guarded medical DX; medical co-morbidities; concern for pt 's mental status or ? depression)  Individuals consulted  Consulted and agree with results and recommendations: Patient;RN    Education  Patient Education Response: Needs reinforcement  Safety Devices in place: Yes  Type of devices: All fall risk precautions in place       Therapy Time  SLP Individual Minutes  Time In: 1350  Time Out: 1432  Minutes: 42   coded time: 15 minutes       Signed  Chery PerezMS,CCC,SLP [id number]  Speech and Language Pathologist  1/27/2022 2:55 PM

## 2022-01-27 NOTE — PROGRESS NOTES
Occupational Therapy    Attempted to see pt for OT Tx. Pt working with speech therapy at time of attempt. Will follow up as schedule and pt condition permit.     Electronically signed by Curtis Luna OT on 1/27/2022 at 2:09 PM

## 2022-01-27 NOTE — PROGRESS NOTES
Patient is resting in bed. Alert but drowsy majority of the time. Responds to voice. Speech incomprehensible this shift. Able to voice that he wanted water. IV capped and flushed. Saturations stayed in normal limits this shift, he is currently on 3L of oxygen. Patient transferred to specialty bed this shift. Assessment complete. All patient needs are met at this time. Fall precautions are in place. Call light is in reach. Will continue to monitor.

## 2022-01-28 NOTE — CARE COORDINATION
Case Management continues to follow for discharge planning needs. Pt's chart reviewed. Pt is known to CM from his initial assessment in the ICU. Received report from his primary RN, Deidra Ceja, regarding current status. Met with Mr. Miri Zamora and his nephew/HCPOA, Sumeet Haji, at the bedside to address discharge concerns. Mr. Miri Zamora was sitting up in the bed and Jeremiah Sender was trying to feed him some applesauce. Mr Miri Zamora is alert and oriented x 3 with a fatigued and depressed affect. Speech is very soft. He is too weak to speak in full sentences. Appears very frail and considerably more pale compared to the first meeting. He is short of breath at rest. Respirations are 26-28 and shallow. O2 down to 3L/ NC from 15L in ICU. Observed his urine is tea-colored. Explained the nursing staff is concerned he has not been eating, drinking, or working with therapy. Directly asked Mr. Miri Zamora what has caused the change in motivation. He stated he was tired. Explained he will decline quickly if he does not take in nutrition or work with therapy. He stated he did not want to do that because he is tired. His nephew encouraged him to try to eat and drink. Mr. Miri Zamora shook his head affirmatively and stated he would try. Explained his condition is life-limiting if he is unable to participate. Addressed code status. Explained CPR, intubation, and the challenges to recovery if he was to survive those aggressive interventions. He shook his head affirmatively and stated he would try to do what was asked. He wants to visit with his family. Jreemiah Sender told him he would have a lot of visitors this weekend. Updated the pt's primary nurse of conversation.     Electronically signed by Theodora Maloney, MADONNA RN-[facility]  Case Management  [phone]

## 2022-01-28 NOTE — PLAN OF CARE
Problem: Falls - Risk of:  Goal: Will remain free from falls  Description: Will remain free from falls  Outcome: Ongoing  Goal: Absence of physical injury  Description: Absence of physical injury  Outcome: Ongoing     Problem: Airway Clearance - Ineffective  Goal: Achieve or maintain patent airway  Outcome: Ongoing     Problem: Gas Exchange - Impaired  Goal: Absence of hypoxia  Outcome: Ongoing  Goal: Promote optimal lung function  Outcome: Ongoing     Problem: Breathing Pattern - Ineffective  Goal: Ability to achieve and maintain a regular respiratory rate  Outcome: Ongoing     Problem:  Body Temperature -  Risk of, Imbalanced  Goal: Ability to maintain a body temperature within defined limits  Outcome: Ongoing  Goal: Will regain or maintain usual level of consciousness  Outcome: Ongoing  Goal: Complications related to the disease process, condition or treatment will be avoided or minimized  Outcome: Ongoing     Problem: Isolation Precautions - Risk of Spread of Infection  Goal: Prevent transmission of infection  Outcome: Ongoing     Problem: Nutrition Deficits  Goal: Optimize nutritional status  Outcome: Ongoing     Problem: Risk for Fluid Volume Deficit  Goal: Maintain normal heart rhythm  Outcome: Ongoing  Goal: Maintain absence of muscle cramping  Outcome: Ongoing  Goal: Maintain normal serum potassium, sodium, calcium, phosphorus, and pH  Outcome: Ongoing     Problem: Loneliness or Risk for Loneliness  Goal: Demonstrate positive use of time alone when socialization is not possible  Outcome: Ongoing     Problem: Fatigue  Goal: Verbalize increase energy and improved vitality  Outcome: Ongoing     Problem: Patient Education: Go to Patient Education Activity  Goal: Patient/Family Education  Outcome: Ongoing     Problem: Skin Integrity:  Goal: Will show no infection signs and symptoms  Description: Will show no infection signs and symptoms  Outcome: Ongoing  Goal: Absence of new skin breakdown  Description: Absence of new skin breakdown  Outcome: Ongoing     Problem: Pain:  Description: Pain management should include both nonpharmacologic and pharmacologic interventions.   Goal: Pain level will decrease  Description: Pain level will decrease  Outcome: Ongoing  Goal: Control of acute pain  Description: Control of acute pain  Outcome: Ongoing  Goal: Control of chronic pain  Description: Control of chronic pain  Outcome: Ongoing  Goal: Patient's pain/discomfort is manageable  Description: Patient's pain/discomfort is manageable  Outcome: Ongoing     Problem: Nutrition  Goal: Optimal nutrition therapy  Outcome: Ongoing     Problem: Infection:  Goal: Will remain free from infection  Description: Will remain free from infection  Outcome: Ongoing     Problem: Safety:  Goal: Free from accidental physical injury  Description: Free from accidental physical injury  Outcome: Ongoing  Goal: Free from intentional harm  Description: Free from intentional harm  Outcome: Ongoing     Problem: Daily Care:  Goal: Daily care needs are met  Description: Daily care needs are met  Outcome: Ongoing     Problem: Skin Integrity:  Goal: Skin integrity will stabilize  Description: Skin integrity will stabilize  Outcome: Ongoing     Problem: Discharge Planning:  Goal: Patients continuum of care needs are met  Description: Patients continuum of care needs are met  Outcome: Ongoing

## 2022-01-28 NOTE — PROGRESS NOTES
Physical Therapy  Attempt Note    Name:Joss Richter  :1945  GUK:[id number]  Room: P7I-3117/5273-01    Date of Service: 2022    Patient chart reviewed. PT attempted to see for PT eval/tx at this time. hold PT treatment session this afternoon due to safe concerns with current medical status. Spoke with RN concerns with patient ability to tolerate mobility at this time, does not appear to be appropriate at this time. Will attempt again in future when medically appropriate. If patient is discharged prior to the next physical therapy visit; Please see last written PT note for discharge status.              Electronically signed by Que Baires PT on 2022 at 1:10 PM

## 2022-01-28 NOTE — ACP (ADVANCE CARE PLANNING)
Advance Care Planning     Advance Care Planning Inpatient Note  [facility] Department    Today's Date: 1/28/2022  Unit: [facility] PROGRESSIVE CARE    Received request from IDT Member. Upon review of chart and communication with care team, request Health Care Provider's clarification of patient's decision making capacity. . Patient and nephew Ramsey Cabrera was/were present in the room during visit. Goals of ACP Conversation:  Discuss advance care planning documents  Facilitate a discussion related to patient's goals of care as they align with the patient's values and beliefs. Health Care Decision Makers:       Primary Decision Maker (Active): Nicole Dubon - [phone]    Secondary Decision Maker: Fabian Fabry - Niece/Nephew - [phone]    Supplemental (Other) Decision Maker: Henrry Hannah - Niece/Nephew - [phone]    Summary:  Documented Next of Kin, per patient report    Advance Care Planning Documents (Patient Wishes):  None     Assessment:  Patient is awake, sitting in bed with cold cloth on head. Patient's nephew Ramsey Cabrear is at bedside. Patient keeps eyes closed most of the time but will open them sometimes. Patient was unclear about next of kin, but nephew said it is his niece and nephew. Patient was very active, independent, and involved in [employer] and with family before he got COVID. Patient indicated that he wants to be a full code with encouragement from nephew.      Interventions:  Discussed and provided education on state decision maker hierarchy  Deferred conversation as patient not interested in completing an advance directive at this time    Care Preferences Communicated:   No    Outcomes/Plan:  ACP Discussion: Postponed    Electronically signed by [facility] on 1/28/2022 at 4:24 PM

## 2022-01-28 NOTE — PROGRESS NOTES
Speech Language Pathology  [facility]   Speech Therapy  Daily Dysphagia Treatment Note        Carrie Hitchcock  AGE: 64 y.o. GENDER: male  : 1945  [id number]  EPISODE DATE:  2022   ADMITTING DIAGNOSIS: The primary encounter diagnosis was COVID-19. Diagnoses of Generalized weakness, Acute respiratory failure with hypoxia ([address] and Atypical pneumonia were also pertinent to this visit. · covid 19 droplet plus contact precautions  · has Hypoxia on their problem list.   has no past medical history on file. · Baseline : Pt reports weight loss for ~2 months. Pt reports regular diet consistencies as baseline. Pt reportedly lives with niece and her .   · No known allergies  ONSET DATE: 2022    Treatment Diagnosis: Dysphagia       Chart review:   · MD History and Physical Documentation revealed:   History Of Present Illness:  Patient is a 77-year-old male without significant past medical history who presented to hospital for fevers chills for about 2 weeks.  Patient has been feeling tired fatigue and having generalized body aches for 2 weeks, he also mentions he has cough, nonproductive.  Denied chest pain nausea vomiting diarrhea constipation.     2022 Chest XR      Minimal improved multifocal pneumonia and or pulmonary edema.      · (previous Chest XT 2022 ; 1/15/2022 and 2022 noted)     2022 Hematology Oncology consult     PT/OT and dietary active with Pt     2022 Clinical Evaluation of Swallowing was completed and diet was downgraded to soft/bite size food consistencies and mildly thick liquids/nectar thick liquids    2022: Pt now out of covid contact precautions  Subjective:     Current diet   dysphagia soft/bite size food consistencies  Mildly thick/nectar thick liquids     Comments regarding tolerating Current Diet:   · RN reporting not eating  · Pt needing much encouragement for po  · Concerns for increasing dysphagia s/s      Objective:     Pain   Patient Currently in Pain: Denies    Cognitive/Behavior   Behavior/Cognition: Pt was in bed with family member at his side. Pt aroused easily. Pt and family member continue to report poor appetite and only taking sips and bites. Pt not taking any textured food today. Pt on 3L/min O2 via nasal cannula      Positioning     Upright in bed ~70 degrees    PO Trials:  · Thin Liquids: N/A  · Nectar thick liquids: s/s with concern for aspiration when taking consecutive sips in succession. Isolated sips appeared better but persistent concern for delayed swallow and reduced laryngeal rom  · Honey Thick liquids: holding of material in mouth with lingual mashing A-P oral transit; persistent concern for delayed swallow  · Puree : holding of material in mouth with lingual mashing A-P oral transit; persistent concern for delayed swallow  · Soft food:prolonged mastication which appeared functional but pt spit out       Dysphagia Tx:   Direct Dysphagia tx: refer to po trials above. Concern for generalized weakness; increase in dysphagia s/s and concern for aspiration. PO intake remains poor despite diet modification  Dysphagia ex: unable to condition pt to ex; pt with expressed disinterest in ex. Pt with persistent weak voice with episodes of dysphonia; volitional cough for airway clearance weak  Training in compensatory strategies: Max ed for pt and family member regarding TIANNA findings; dysphagia tx rationale; and dysphagia s/s and aspiration s/s. Reviewed today's recommendation for further diet downgrade and now that pt is out of covid 19 precautions; referral back to MD for consideration for Modified Barium Swallow for assessment of pharyngeal phase of the swallow and referral back to MD for consideration for assessment of pt candidacy for alternative mode of nutrition for hydration/nutrition due to persistent poor po intake.   Pt response to ex/training: Pt did not demonstrate understanding of ed or interest in ed. Pt's family member did voice understanding and feels his uncle has lost significant amount of weight. Goals:   Dysphagia Goals:   1. The patient will tolerate dysphagia soft/bite size food with mildly thick liquid diet without observed clinical signs of aspiration, Goal not met; suggest diet downgrade to dysphagia puree  2. The patient/caregiver will demonstrate understanding of compensatory strategies for improved swallowing safety. Goal not met;continue  3. Pt will tolerate trial lingual/laryngeal/pharyngeal ex 10/10.: goal not met; pt with limited active participation   4. Pt will tolerate RAMp Sports Protocol between meals without accompanying food/med without s/s of aspiration : goal not met  5. P t will demonstrate improved bolus prep/manipulation of regular solid solid food consistencies without s/s of aspiration: goal not met  6. Pt will participate in an instrumental (Modified Barium Swallow Study) for more objective assessment of pharyngeal phase of the swallow: NEW GOAL and request to MD    Assessment:   Impressions:   Per 1/27/2022 Initial Clinical Evaluation of Swallowing Dysphagia Diagnosis: Mild to moderate oral stage dysphagia;Mild to moderate pharyngeal stage dysphagia characterized by generalized muscular weakness which can impact mastication; bolus control and A-P oral transit; delayed initiation of swallow; reduced laryngeal elevation and potential reduced pharyngeal clearance. Pt with inconsistent s/s with concern for aspiration with thin liquids and delayed throat clearing post swallow of regular hard solid OR soft dry solid foods. Pt appeared to better tolerate mildly thick liquids, puree and mech soft moist/bite size food consistencies. Recommend trial downgrade to dysphagia soft/bite size food consistencies; mildly thick liquids; continue oral supplements.  SLP dysphagia treatment trial. Anticipate potential need for MBSS to assess pharyngeal phase of the swallow when out of covid 19 contact precautions. Pt does need encouragement. Dietary consult for calorie count. 1/28/2022 SLP Dysphagia treatment f/u  · Pt with persistent poor po intake despite diet downgrade  · Pt with increasing dysphagia s/s during this SLP session  · Recommend downgrade to dysphagia puree with close monitor of mildly thick liquids  · Pt and family member were educated regarding recommendations and rationale. Pt nodded head yes but unsure if fully grasped. Family member voiced understanding and actually made the comment that his uncle appears to have lost a lot of weight. · Pt is now out of covid contact precautions  Recommend referral back to MD for consideration for assessment of pt candidacy for alternative nutrition/hydration mode as pt's po intake has been poor for several days. Recommend referral back to MD for Modified Barium Swallow orders due to concern for pharyngeal phase dysphagia with aspiration risk which may be contributing to current toleration  · Discussed with RN; await orders for MBSS if MD is in agreement    Diet Recommendations:    dysphagia puree with mildly thick/nectar thick liquids with close monitor  Recommended Form of Meds: Meds in puree    Strategies:   Compensatory Swallowing Strategies: Upright as possible for all oral intake,Small bites/sips,Swallow 2 times per bite/sip,Remain upright for 30-45 minutes after meals (oral care)    Education:  Consulted and agree with results and recommendations: Patient,RN  Patient Education Response: Needs reinforcement    Prognosis:   good guarded medical DX; medical co-morbidities; concern for pt 's mental status or ? depression and concern for  Pharyngeal Dysphagia s/s contributing to problems    Plan:     Continue Dysphagia Therapy:   Interventions: Therapeutic Interventions: Diet tolerance monitoring,Therapeutic PO trials with SLP,Patient/Family education,Bolus control exercises,Laryngeal exercises,Miller Water Protocol  Duration/Frequency of therapy while on unit: Duration/Frequency of Treatment  Duration/Frequency of Treatment: 3-5 times while on acute medical floor  Discharge Instructions:   Anticipate Yes__X__No__ for further skilled Speech Therapy for Dysphagia at discharge    This note serves as a D/C Summary in the event that this patient is discharged prior to the next therapy session. Coded treatment time: 0  Total treatment time: 24 minutes    Electronically signed by   Lea Carter. Ana,MS,CCC,SLP [id number]  Speech and Language Pathologist  on 1/28/2022 at 4:17 PM

## 2022-01-28 NOTE — PROGRESS NOTES
Hospitalist Progress Note      PCP: No primary care provider on file. Date of Admission: 1/14/2022    Chief Complaint: SOB/ fatigue. Hospital Course:    76-year-old male without significant past medical history who presented to hospital for fevers chills for about 2 weeks.  Patient has been feeling tired fatigue and having generalized body aches for 2 weeks, he also mentions he has cough, nonproductive.  Denied chest pain nausea vomiting diarrhea constipation. Admitted for COVID pneumonia. 1/20 worsening oxygen requirements, placed on vapotherm. Subjective:   Oxygen requirements improving, patient is currently on 3 liters of oxygen. Patient seems more withdrawn, not participating with physical therapy. Patient is known to writer for the past 10 days and there is significant change in the patient's demeanor.      Medications:  Reviewed    Infusion Medications    lactated ringers      sodium chloride      sodium chloride      sodium chloride 25 mL (01/18/22 2103)     Scheduled Medications    buPROPion  150 mg Oral Daily    enoxaparin  30 mg SubCUTAneous Nightly    [Held by provider] furosemide  40 mg Oral Daily    sodium chloride flush  10 mL IntraVENous 2 times per day     PRN Meds: sodium chloride, sodium chloride, diphenhydrAMINE, benzocaine-menthol, sodium chloride, sodium chloride flush, sodium chloride, potassium chloride **OR** potassium alternative oral replacement **OR** potassium chloride, potassium chloride, magnesium sulfate, promethazine **OR** ondansetron, magnesium hydroxide, acetaminophen **OR** acetaminophen      Intake/Output Summary (Last 24 hours) at 1/28/2022 1339  Last data filed at 1/28/2022 0600  Gross per 24 hour   Intake 300 ml   Output 700 ml   Net -400 ml       Physical Exam Performed:    /70   Pulse 99   Temp 97.7 °F (36.5 °C) (Oral)   Resp 16   Ht 6' 2\" (1.88 m)   Wt 144 lb 13.5 oz (65.7 kg)   SpO2 92%   BMI 18.60 kg/m²     General appearance: No apparent distress, appears stated age and cooperative. on NC. Looks ill. HEENT: Pupils equal, round, and reactive to light. Conjunctivae/corneas clear. Neck: Supple, with full range of motion. No jugular venous distention. Trachea midline. Respiratory:  Normal respiratory effort. Clear to auscultation, bilaterally without Rales/Wheezes/Rhonchi. Cardiovascular: Regular rate and rhythm with normal S1/S2 without murmurs, rubs or gallops. Abdomen: Soft, non-tender, non-distended with normal bowel sounds. Musculoskeletal: No clubbing, cyanosis or edema bilaterally. Full range of motion without deformity. Skin: Skin color, texture, turgor normal.  No rashes or lesions. Neurologic:  Neurovascularly intact without any focal sensory/motor deficits. Cranial nerves: II-XII intact, grossly non-focal.sensation intact. Patient reports some tingling in the right hand. Psychiatric: Alert and oriented, thought content appropriate, normal insight  Capillary Refill: Brisk,3 seconds, normal   Peripheral Pulses: +2 palpable, equal bilaterally       Labs:   Recent Labs     01/26/22  0516 01/27/22 0516 01/28/22 0521   WBC 4.6 6.7 10.1   HGB 9.1* 9.5* 10.0*   HCT 26.9* 27.8* 29.5*    188 214     Recent Labs     01/26/22  0516 01/27/22 0516 01/28/22 0521    138 140   K 4.1 4.5 4.3   CL 98* 96* 99   CO2 26 29 26   BUN 31* 41* 57*   CREATININE 0.6* 0.7* 1.1   CALCIUM 8.0* 8.7 8.9     Recent Labs     01/26/22  0516 01/27/22 0516 01/28/22 0521   AST 17 17 27   ALT 23 23 31   BILIDIR 0.4* 0.5* 0.6*   BILITOT 1.0 1.1* 1.4*   ALKPHOS 107 114 120     No results for input(s): INR in the last 72 hours. No results for input(s): Trinity Barroso in the last 72 hours.     Urinalysis:      Lab Results   Component Value Date    NITRU Negative 01/17/2022    WBCUA 0 01/17/2022    RBCUA 1 01/17/2022    BLOODU Negative 01/17/2022    SPECGRAV 1.013 01/17/2022    GLUCOSEU Negative 01/17/2022       Radiology:  XR CHEST PORTABLE Final Result   Minimal improved multifocal pneumonia and or pulmonary edema. XR CHEST PORTABLE   Final Result   Borderline cardiomegaly and moderate central pulmonary congestion. Hazy ground-glass opacities along the perihilar regions which is more   prominent and could represent pulmonary edema and/or developing pneumonia. XR CHEST 1 VIEW   Final Result   Patchy infiltrates within both lungs, most compatible with multifocal   pneumonia, especially COVID-19 pneumonia. All in all, findings are similar   when compared to the previous exam from yesterday. XR CHEST PORTABLE   Final Result   Ground-glass and interstitial opacities, indeterminate for pulmonary edema or   atypical pneumonia, including COVID. Minimal consolidation in the left base, asymmetrical airspace disease or   atelectasis. Assessment/Plan:    Active Hospital Problems    Diagnosis     Hypoxia [R09.02]      Acute hypoxic respiratory failure. Covid 19 Pneumonia. Sepsis. Patient was admitted with COVID-pneumonia, was initially on  on 15 L via nasal cannula. S/p Actemra 1/16. Procalcitonin 0.98--> 0.24 ( completed course of ceftriaxone /azithromycin). worsening oxygen requirements, started on HFNC 1/20. Continues to in HFNC ( slightly improved). CXR 1/21 with no changes. Completed 10 days of dexamethasone ( completed 10/24). Oxygen weaned down to 3 liters of oxygen. Patient does seem to be getting more weak over the past 2 days. Repeated CRP undetectable, procalcitonin 0.23 ( trended down)  X-ray with improved consolidation  Plan.  -Wean down oxygen as tolerated. -Incentive spirometry. - treat possible underlining depression    Acute blood loss anemia. No active signs of bleeding, received 2 unit of PRBC since admission  Hb today is 10  Iron studies normal, likely bone marrow suppression due to COVID  Occult blood negative.    retic 0.54 , retic index indicating Hypoproliferation. Patient was seen by hematology, offered [facility] ( declined). Plan.  -Continue to monitor hemoglobin. - appreciate hematology input. Possible depression vs adjustment disorder. Patient had been gradually been more withdrawn and less engaged despite improved in his medical condition. Above discussed with the patient's nurse who agreed to the patient's demeanor has changed and the patient is not engaged in therapy. Plan.  -We'll communicate with family.  -We'll start the patient on Wellbutrin 150 mg daily to be titrated up    Right upper limb numbness ( stable)  Seems positional, none dermatomal with no decrease in sensation. Will continue to follow symptoms. Elevated troponin likely demand ischemia  Resolved. Social.   Patient continues to be full code with no limitations in care     DVT Prophylaxis:lovenox  Diet: ADULT ORAL NUTRITION SUPPLEMENT; Breakfast, Lunch, Dinner; Standard High Calorie/High Protein Oral Supplement  ADULT DIET; Dysphagia - Soft and Bite Sized; Mildly Thick (Nectar)  Code Status: Full Code    PT/OT Eval Status: following    Dispo - ECF ( if oxygen continues to be stable in 2-3 days).        Pancho Little MD

## 2022-01-28 NOTE — PROGRESS NOTES
Occupational Therapy  Facility/Department: [address] PROGRESSIVE CARE  Daily Treatment Note and Tentative D/C    NAME: Vernell Davalos  : 1945  MRN: [id number]    Date of Service: 2022    Discharge Recommendations: Vernell Davalos scored a 14/24 on the AM-PAC ADL Inpatient form. Current research shows that an AM-PAC score of 17 or less is typically not associated with a discharge to the patient's home setting. Based on the patient's AM-PAC score and their current ADL deficits, it is recommended that the patient have 3-5 sessions per week of Occupational Therapy at d/c to increase the patient's independence. Please see assessment section for further patient specific details. If patient discharges prior to next session this note will serve as a discharge summary. Please see below for the latest assessment towards goals. Continue to assess pending progress,Patient would benefit from continued therapy after discharge,3-5 sessions per week  OT Equipment Recommendations  Equipment Needed: No    Assessment   Performance deficits / Impairments: Decreased functional mobility ; Decreased ADL status; Decreased balance;Decreased strength;Decreased endurance;Decreased high-level IADLs  Assessment: Pt tolerated session poorly this date. Pt completes bed mobility with Mod/Max A. Pt on 3L O2 with SpO2 >90% at rest. Upon sitting EOB, pt reports fatigue and attempting to return to supine. While seated EOB pt's SpO2 decreasing into 80s. Unable to direct pt for pursed lip breathing. Pt returned to supine with Max A. Continue with POC. Prognosis: Fair;Good  OT Education: Transfer Training;OT Role;Plan of Care  REQUIRES OT FOLLOW UP: Yes  Activity Tolerance  Activity Tolerance: Patient limited by fatigue  Safety Devices  Safety Devices in place: Yes  Type of devices: Call light within reach; Bed alarm in place; Left in bed;Gait belt         Patient Diagnosis(es): The primary encounter diagnosis was COVID-19.  Diagnoses of Generalized weakness, Acute respiratory failure with hypoxia ([facility] [address] and Atypical pneumonia were also pertinent to this visit. has no past medical history on file. has no past surgical history on file. Restrictions  Restrictions/Precautions  Restrictions/Precautions: Fall Risk,Isolation  Position Activity Restriction  Other position/activity restrictions: Droplet Plus : COVID +. O2 3L oxygen. Subjective   General  Chart Reviewed: Yes  Patient assessed for rehabilitation services?: Yes  Additional Pertinent Hx: per MD note, Patient is a 68-year-old male without significant past medical history who presented to hospital for fevers chills for about 2 weeks. Patient has been feeling tired fatigue and having generalized body aches for 2 weeks, he also mentions he has cough, nonproductive. Denied chest pain nausea vomiting diarrhea constipation. Family / Caregiver Present: No  Referring Practitioner: Reese Glez MD  Subjective  Subjective: Pt needing encouragement for activity this date. Pt on 3L O2 with SpO2 >90% at rest.  General Comment  Comments: okay for therapy per RN. Orientation  Orientation  Overall Orientation Status: Within Functional Limits  Orientation Level: Oriented to person;Oriented to place  Objective    ADL  LE Dressing: Dependent/Total (assist to don bilateral socks)  Toileting: Dependent/Total (male purewick)        Balance  Sitting Balance: Minimal assistance (brief SBA with pt attempting to return to supine multiple times; pt reports fatigue; SpO2 decreasing into low 80s; pt declines attempt to stand or OOB at this time)  Bed mobility  Rolling to Left: Minimal assistance  Rolling to Right: Minimal assistance  Supine to Sit: Maximum assistance; Moderate assistance  Sit to Supine: Maximum assistance  Scooting: Maximal assistance  Transfers  Sit to stand: Unable to assess  Stand to sit: Unable to assess              Cognition  Overall Cognitive Status: Exceptions  Arousal/Alertness: Delayed responses to stimuli  Following Commands: Follows one step commands with increased time; Follows one step commands with repetition; Inconsistently follows commands  Insights: Decreased awareness of deficits  Initiation: Requires cues for all  Sequencing: Requires cues for all          Plan   Plan  Times per week: 3-5x  Current Treatment Recommendations: Strengthening,Functional Mobility Training,Gait Training,Endurance Training,Balance Training,Safety Education & Training,Self-Care / ADL,Equipment Evaluation, Education, & procurement,Patient/Caregiver Education & Training    AM-PAC Score        AM-[facility] Inpatient Daily Activity Raw Score: 14 (01/28/22 1059)  AM-PAC Inpatient ADL T-Scale Score : 33.39 (01/28/22 1059)  ADL Inpatient CMS 0-100% Score: 59.67 (01/28/22 1059)  ADL Inpatient CMS G-Code Modifier : CK (01/28/22 1059)    Goals  Short term goals  Time Frame for Short term goals: prior to D/C: ongoing 1/28  Short term goal 1: complete functional mobility and transfers Mod Ind  Short term goal 2: complete bathing and dressing Mod Ind  Short term goal 3: complete toileting Mod Ind  Short term goal 4: complete grooming in stance at sink [address] term goals  Time Frame for Long term goals : STG=LTG  Patient Goals   Patient goals : no stated goals       Therapy Time   Individual Concurrent Group Co-treatment   Time In 1033         Time Out 1057         Minutes 24         Timed Code Treatment Minutes: 24 Minutes       NISHANT Gayle/L

## 2022-01-28 NOTE — PROGRESS NOTES
Hematology Oncology Daily Progress Note    Admit Date: 1/14/2022  Hospital day several    Subjective:     Patient has complaints of ongoing SOB and CORREA and gen weakness--denies CP. Medication side effects: none    Scheduled Meds:   enoxaparin  30 mg SubCUTAneous Nightly    furosemide  40 mg Oral Daily    sodium chloride flush  10 mL IntraVENous 2 times per day     Continuous Infusions:   sodium chloride      sodium chloride      sodium chloride 25 mL (01/18/22 2103)     PRN Meds:sodium chloride, sodium chloride, diphenhydrAMINE, benzocaine-menthol, sodium chloride, sodium chloride flush, sodium chloride, potassium chloride **OR** potassium alternative oral replacement **OR** potassium chloride, potassium chloride, magnesium sulfate, promethazine **OR** ondansetron, magnesium hydroxide, acetaminophen **OR** acetaminophen    Review of Systems  Pertinent items are noted in HPI. REVIEW OF SYSTEMS:         · Constitutional: Denies fever, sweats, weight loss     · Eyes: No visual changes or diplopia. No scleral icterus. · ENT: No Headaches, hearing loss or vertigo. No mouth sores or sore throat. · Cardiovascular: No chest pain, dyspnea on exertion, palpitations or loss of consciousness. · Respiratory: No cough or wheezing, no sputum production. No hemoptysis. .    · Gastrointestinal: No abdominal pain, appetite loss, blood in stools. No change in bowel habits. · Genitourinary: No dysuria, trouble voiding, or hematuria. · Musculoskeletal:  Generalized weakness. No joint complaints. · Integumentary: No rash or pruritis. · Neurological: No headache, diplopia. No change in gait, balance, or coordination. No paresthesias. · Endocrine: No temperature intolerance. No excessive thirst, fluid intake, or urination. · Hematologic/Lymphatic: No abnormal bruising or ecchymoses, blood clots or swollen lymph nodes. · Allergic/Immunologic: No nasal congestion or hives.    ·     Objective:     Patient Vitals for the past 8 hrs: BP Temp Temp src Pulse Resp SpO2 Weight   01/28/22 0440 114/68 97.9 °F (36.6 °C) Oral 92 18 92 % 144 lb 13.5 oz (65.7 kg)   01/28/22 0000 110/60 97.5 °F (36.4 °C) Oral 98 20 90 % --     I/O last 3 completed shifts: In: 300 [P.O.:300]  Out: 700 [Urine:700]  No intake/output data recorded. /68   Pulse 92   Temp 97.9 °F (36.6 °C) (Oral)   Resp 18   Ht 6' 2\" (1.88 m)   Wt 144 lb 13.5 oz (65.7 kg)   SpO2 92%   BMI 18.60 kg/m²     General Appearance:    Alert, cooperative, no distress, appears stated age   Head:    Normocephalic, without obvious abnormality, atraumatic   Eyes:    PERRL, conjunctiva/corneas clear, EOM's intact, fundi     benign, both eyes        Ears:    Normal TM's and external ear canals, both ears   Nose:   Nares normal, septum midline, mucosa normal, no drainage    or sinus tenderness   Throat:   Lips, mucosa, and tongue normal; teeth and gums normal   Neck:   Supple, symmetrical, trachea midline, no adenopathy;        thyroid:  No enlargement/tenderness/nodules; no carotid    bruit or JVD   Back:     Symmetric, no curvature, ROM normal, no CVA tenderness   Lungs:     Clear to auscultation bilaterally, respirations unlabored   Chest wall:    No tenderness or deformity   Heart:    Regular rate and rhythm, S1 and S2 normal, no murmur, rub   or gallop   Abdomen:     Soft, non-tender, bowel sounds active all four quadrants,     no masses, no organomegaly           Extremities:   Extremities normal, atraumatic, no cyanosis or edema   Pulses:   2+ and symmetric all extremities   Skin:   Skin color, texture, turgor normal, no rashes or lesions   Lymph nodes:   Cervical, supraclavicular, and axillary nodes normal   Neurologic:   Stable         Data Review  CBC:   Lab Results   Component Value Date    WBC 10.1 01/28/2022    RBC 2.72 01/28/2022       Assessment:     Active Problems:    Hypoxia  Resolved Problems:    * No resolved hospital problems. *      Plan:     1. Pancytopenia. His counts are slowly improving. This is likely multifactorial and partially due to bone marrow suppression from Covid. In light of his increased MCV, he may have a component of MDS. He is refusing a bone marrow biopsy until he starts to feel better. It could be done as an outpatient as well. 2.  Covid pneumonia.   I will defer to the primary team.        Electronically signed by Erum Galicia MD on 1/28/2022 at 7:54 AM

## 2022-01-29 NOTE — PROGRESS NOTES
Hospitalist Progress Note      PCP: No primary care provider on file. Date of Admission: 1/14/2022    Chief Complaint: SOB/ fatigue. Hospital Course:    68-year-old male without significant past medical history who presented to hospital for fevers chills for about 2 weeks.  Patient has been feeling tired fatigue and having generalized body aches for 2 weeks, he also mentions he has cough, nonproductive.  Denied chest pain nausea vomiting diarrhea constipation. Admitted for COVID pneumonia. 1/20 worsening oxygen requirements, placed on vapotherm. Subjective:   Patient continues to be weak and withdrawn . Oxygen requirements worsening, on currently on 3--> 9 liters of oxygen. Medications:  Reviewed    Infusion Medications    lactated ringers 75 mL/hr at 01/29/22 0302    sodium chloride      sodium chloride      sodium chloride 25 mL (01/18/22 2103)     Scheduled Medications    fluticasone  1 spray Each Nostril Daily    buPROPion  150 mg Oral Daily    enoxaparin  30 mg SubCUTAneous Nightly    [Held by provider] furosemide  40 mg Oral Daily    sodium chloride flush  10 mL IntraVENous 2 times per day     PRN Meds: sodium chloride, sodium chloride, diphenhydrAMINE, benzocaine-menthol, sodium chloride, sodium chloride flush, sodium chloride, potassium chloride **OR** potassium alternative oral replacement **OR** potassium chloride, potassium chloride, magnesium sulfate, promethazine **OR** ondansetron, magnesium hydroxide, acetaminophen **OR** acetaminophen    No intake or output data in the 24 hours ending 01/29/22 1228    Physical Exam Performed:    BP (!) 95/55   Pulse 99   Temp 97.8 °F (36.6 °C) (Oral)   Resp 15   Ht 6' 2\" (1.88 m)   Wt 153 lb (69.4 kg)   SpO2 92%   BMI 19.64 kg/m²     General appearance: No apparent distress, appears stated age and cooperative. on NC. Looks ill. HEENT: Pupils equal, round, and reactive to light. Conjunctivae/corneas clear.   Neck: Supple, with full range of motion. No jugular venous distention. Trachea midline. Respiratory:  Normal respiratory effort. Clear to auscultation, bilaterally without Rales/Wheezes/Rhonchi. Cardiovascular: Regular rate and rhythm with normal S1/S2 without murmurs, rubs or gallops. Abdomen: Soft, non-tender, non-distended with normal bowel sounds. Musculoskeletal: No clubbing, cyanosis or edema bilaterally. Full range of motion without deformity. Skin: Skin color, texture, turgor normal.  No rashes or lesions. Neurologic:  Neurovascularly intact without any focal sensory/motor deficits. Cranial nerves: II-XII intact, grossly non-focal.sensation intact. Patient reports some tingling in the right hand. Psychiatric: Alert and oriented, thought content appropriate, normal insight  Capillary Refill: Brisk,3 seconds, normal   Peripheral Pulses: +2 palpable, equal bilaterally       Labs:   Recent Labs     01/27/22  0516 01/28/22  0521 01/29/22  0648   WBC 6.7 10.1 9.9   HGB 9.5* 10.0* 8.7*   HCT 27.8* 29.5* 26.5*    214 204     Recent Labs     01/27/22  0516 01/28/22  0521 01/29/22  0648    140 142   K 4.5 4.3 4.7   CL 96* 99 102   CO2 29 26 25   BUN 41* 57* 74*   CREATININE 0.7* 1.1 1.7*   CALCIUM 8.7 8.9 8.1*     Recent Labs     01/27/22  0516 01/28/22  0521 01/29/22  0648   AST 17 27 59*   ALT 23 31 62*   BILIDIR 0.5* 0.6* 0.5*   BILITOT 1.1* 1.4* 1.3*   ALKPHOS 114 120 137*     No results for input(s): INR in the last 72 hours. No results for input(s): Francisco Dariaer in the last 72 hours. Urinalysis:      Lab Results   Component Value Date    NITRU Negative 01/17/2022    WBCUA 0 01/17/2022    RBCUA 1 01/17/2022    BLOODU Negative 01/17/2022    SPECGRAV 1.013 01/17/2022    GLUCOSEU Negative 01/17/2022       Radiology:  XR CHEST PORTABLE   Final Result   Minimal improved multifocal pneumonia and or pulmonary edema.          XR CHEST PORTABLE   Final Result   Borderline cardiomegaly and moderate central pulmonary congestion. Hazy ground-glass opacities along the perihilar regions which is more   prominent and could represent pulmonary edema and/or developing pneumonia. XR CHEST 1 VIEW   Final Result   Patchy infiltrates within both lungs, most compatible with multifocal   pneumonia, especially COVID-19 pneumonia. All in all, findings are similar   when compared to the previous exam from yesterday. XR CHEST PORTABLE   Final Result   Ground-glass and interstitial opacities, indeterminate for pulmonary edema or   atypical pneumonia, including COVID. Minimal consolidation in the left base, asymmetrical airspace disease or   atelectasis. FL MODIFIED BARIUM SWALLOW W VIDEO    (Results Pending)           Assessment/Plan:    Active Hospital Problems    Diagnosis     Hypoxia [R09.02]      Acute hypoxic respiratory failure. Covid 19 Pneumonia. Sepsis. Patient was admitted with COVID-pneumonia, was initially on  on 15 L via nasal cannula. S/p Actemra 1/16. Procalcitonin 0.98--> 0.24 ( completed course of ceftriaxone /azithromycin). started on HFNC 1/20. CXR 1/21,1/27  with some improvement. Completed 10 days of dexamethasone ( completed 10/24). Oxygen weaned down to 3 liters of oxygen 1/28. Patient does seem to be getting more weak over the past 2 days. Repeated CRP undetectable, procalcitonin 0.23 ( trended down)  Oxygen requirements worsening again, up to 9 liters this morning. Plan.  -Wean down oxygen as tolerated. -Incentive spirometry. - treat possible underlining depression ( patient is withdrawn and does not participate). Acute blood loss anemia. No active signs of bleeding, received 2 unit of PRBC since admission  Hb today is 8.7 ( around baseline). Iron studies normal, likely bone marrow suppression due to COVID  Occult blood negative. retic 0.54 , retic index indicating Hypoproliferation. Patient was seen by hematology, offered [facility] ( [facility]). Plan.  -Continue to monitor hemoglobin. - appreciate hematology input. Possible depression vs adjustment disorder. Patient had been gradually been more withdrawn and less engaged despite improved in his medical condition. Above discussed with the patient's nurse who agreed to the patient's demeanor has changed and the patient is not engaged in therapy. Plan.  -Wellbutrin 150 mg daily ( started 1/28) to be titrated up    Dysphagia. Seen by SLP, planned for MBS. Elevated troponin likely demand ischemia  Resolved. Social.   Patient continues to be full code with no limitations in care. Spoke extensively to [employer] ( [phone]). , family understand that the patient will not improve if he does not participate in care including PT. DVT Prophylaxis:lovenox  Diet: ADULT ORAL NUTRITION SUPPLEMENT; Breakfast, Lunch, Dinner; Standard High Calorie/High Protein Oral Supplement  ADULT DIET; Dysphagia - Pureed; Mildly Thick (Nectar)  Code Status: Full Code    PT/OT Eval Status: following    Dispo - ECF once stablized.        Juan F Neville MD

## 2022-01-29 NOTE — PLAN OF CARE
Problem: Falls - Risk of:  Goal: Will remain free from falls  Description: Will remain free from falls  Outcome: Met This Shift  Goal: Absence of physical injury  Description: Absence of physical injury  Outcome: Met This Shift   Alert and oriented x4 but withdrawn and will only hold a short conversation Dyspnic with min. Exertion Sats. WNL on 3L O2 per n/c Lungs diminished.  Cough and deep breathing exercises encouraged No c/o pain Male purwik in place with brownish colored urine Frequently turned and repositioned Free from fall/injury

## 2022-01-29 NOTE — PROGRESS NOTES
CMU called RN at [employer] and informed that the patient's oxygen level was 80%. RN went into room to assess patient. Patient drowsy but arousable. RN turned O2 to 6 L nasal canula. Patient continue to maintain in the low 80s. RN placed a high flow nasal cannula on patient with 10L. Patient recovered to low 90s after 10 minutes on 10L. MD and RT notified. RN will continue to monitor.

## 2022-01-30 NOTE — PROGRESS NOTES
Consult receipt for ALISON  Patient has been here a long time with Covid. His nurse tells me hes not eating her drinking period and feeling to thrive no reported edema. Hes been on LR at 75 mL per hour and now its on normal sailing at 75 mL per hour  I have requested a repeat renal panel at 2 oclock your analysis youre at gas it and CK level.   Bladder scan and placeful if PVR greater than 200 mL  Increase fluids to 125 m L per hour    For consult will follow

## 2022-01-30 NOTE — CONSULTS
[address] [place] [address] Nephrology   [facility]  [phone]  Nephrology Consult Note          Patient ID: Vida Reed  Referring/ Physician: Nicholas Parisi MD      HPI/Summary:   Vida Reed is being seen by nephrology for ALISON. This is a 77-year-old man with no past medical history on file who presented with Covid pneumonia. He has had a prolonged hospital stay and is not doing well. Over the last few days he has had poor p.o. intake not eating failing to thrive. Today he developed worsening hypoxic respiratory failure and hypotension and was transferred to the ICU. He is now on pressors worsening renal function noted and mild hyperkalemia. He is not making much urine, having trouble placing regular catheters or trying a coudé  catheter though his last bladder scan was negative for urine retention. An abdominal ultrasound has been ordered. Patient was seen in the ICU with his nephew at bedside these are his next of kin. Patient is awake but not alert very confused and lethargic. He is on OptiFlow. He is on 8-10 mics of Levophed with a MAP of 53 when seen satting 92%. He does have some increased work of breathing not much peripheral edema. Per report he has not been eating and drinking over the past several days. He had been on IV fluids 75 cc/h for the last few days. Fluids were briefly increased to 125 cc today but now on hold due to worsening respiratory status. He was evaluated by oncology for possible malignancy, given macrocytic anemia but patient refused further work-up      Plan:   - has not responded much to fluids. Still hypotensive  Check BNP, AM cortisol . They would give stress dose steroids empirically at this point  - placing pat   - strict IO   - check echo, giving lasix 60 mg IV once for worsening hypoxic resp failure and holding fluids. - discussed with patient's nephew that he may need dialysis if things do not improve. He is still full code.  I explained that his prognosis is not good. Patient is frail and will not likely do well even with aggressive interventions. He will consider this. 35 mins of critical care time spent       # ALISON   Suspect he has ATN in the setting of hypotension, hypoxia, poor p.o. intake sustained dehydration  Baseline creatinine 0.5-0.6  BUN 90 at time of consult  Creatinine 2.7 at time of consult  Patient was getting IV fluids for a few days prior to consult. No contrast this admission  Bladder scan was negative for post void residual  He had a urinalysis on 117 that showed 1 hyaline casts 1 RBC otherwise bland no proteinuria. Had not had a [place] prior to consult, I's and O's not accurate. Uric acid  CK  UA    #Electrolytes  Hyperkalemia 5.2 at time of consult  Bicarb 25 calcium 7.8 at time of consult corrected normal.    #Hypotension  Etiology unclear, has been hospitalized for a long time  Repeat infection work-up, check an echo  Check a.m. cortisol  Stress dose steroids, Levophed  Target map 65 or greater for renal perfusion    #Abnormal liver enzymes  AST and ALT have been rising since 1/29/2022  Abdominal ultrasound ordered    #Anemia  Elevated MCV  B12 and folate are normal  Ferritin 613 and TSAT 44% so not iron deficient  No significant thrombocytopenia to suggest TMA process        [facility] would like to thank you for the opportunity to serve this patient. Please call with any questions or concerns. Melita Terry MD  [facility]  [address], [address] [place] [address] Av  Fax: [phone]  Office: [phone]         CC/Reason for consult:   Reason for consult: ALISON  Chief Complaint   Patient presents with    Fatigue           Review of Systems:   Unable to obtain  Patient was not very responsive when seen initially. PMH/SH/FH:    Medical Hx: reviewed and updated as appropriate  History reviewed. No pertinent past medical history.       Surgical Hx: reviewed and updated as appropriate   has no past surgical 01/29/22  0648 01/30/22  0606    142 144   K 4.3 4.7 5.2*   CL 99 102 103   CO2 26 25 25   BUN 57* 74* 90*   CREATININE 1.1 1.7* 2.7*   GLUCOSE 142* 158* 150*

## 2022-01-30 NOTE — PROCEDURES
PICC PLACEMENT:  Preprocedure & timeout done w primary RN RADHA; +PICC Order & Consent verified; +Neprology clearance obtained from Dr. Gloria Hernandez to place PICC as ordered; no difficulty accessing R Basilic vein; picc tip is verified using [employer] 3cg Tip Confirmation System with positive pwave and no negative deflection visualized at 3 cm out, confirming optimal tip positioning; waveform attached below; reported same and ok to use PICC to primary RN; pt tolerated procedure well; pt will remain in bed a rest post procedure in order to promote hemostasis to the insertion site; pt is left in a position of comfort w siderails up x2, call light in reach and bed is locked in lowest position; Order for OK to use PICC is placed in EMR    NURSES:  *please replace all existing IV tubing with new IV tubing prior to using the PICC for current IV infusions. *please remove any PIVs from RIGHT arm. *please refrain from obtaining BPs in the RIGHT arm. All of the above may be sources of infection or damage to the PICC line/site.     CHILANGO Tanner RN, BSN, Alexandru Valencia.

## 2022-01-30 NOTE — PROGRESS NOTES
Pt transferred to ICU bed 2106. Dr. Lesli Dickerson notified of pt transfer and norepinephrine started per verbal order. Pt's nephew, Lambert Solorio, updated on current condition and present upon transfer. RT notified and pt placed on vapotherm to maintain O2 saturation >90%.

## 2022-01-30 NOTE — CONSULTS
Pulmonary Critical Care Consult Note     Patient's name:  Kulwinder Collado  Medical Record Number: [id number]  Patient's account/billing number: [de-identified]  Patient's YOB: 1945  Age: 64 y.o. Date of Admission: 1/14/2022  1:40 PM  Date of Consult: 1/30/2022      Primary Care Physician: No primary care provider on file. Code Status: Full Code    Reason for consult: hypotension     Assessment and Plan     1. Acute respiratory failure with hypoxia  2. HCAP  3. COVID 19 PNA  4. Septic shock  5. ARF oliguric with hyperkalemia  6. Anemia   7.  right upper quadrant abdominal pain  8. Transaminitis      Plan:  Pressors to keep map > 65  Hydrocortisone   Broad spectrum antibiotics, Cefepime, check MRSA swab, follow up culture results. Titrate FiO2 to keep sats more than 90% alternate Vapotherm and BiPAP high risk for intubation. Follow-up ultrasound abdomen  Currently still full code. HISTORY OF PRESENT ILLNESS:   Mr./Ms. Kulwinder Collado is a 64 y.o. Gentleman transferred to ICU for hypotension. He was admitted with sob and found to have covid pneumonia, tested positive on 1/14, had prolonged hospitalization with failure to thrive remains very weak, was transferred to the ICU because of hypotension and worsening respiratory failure. Chest x-ray with worsening bilateral multifocal airspace disease. Past Medical History:  History reviewed. No pertinent past medical history. Past Surgical History:  History reviewed. No pertinent surgical history. Allergies:    No Known Allergies      Home Meds:   Prior to Admission medications    Not on File       Family History:   History reviewed. No pertinent family history. Social History:   TOBACCO:   reports that he has never smoked. He has never used smokeless tobacco.  ETOH:   reports no history of alcohol use. DRUGS:  reports no history of drug use.           REVIEW OF SYSTEMS:  Review of Systems -   General ROS: Fatigue,  Psychological ROS: negative  Ophthalmic ROS: negative  ENT ROS: negative  Allergy and Immunology ROS: negative  Hematological and Lymphatic ROS: negative  Endocrine ROS: negative  Breast ROS: negative  Respiratory ROS: sob, cough most of breath  Cardiovascular ROS: no chest pain or dyspnea on exertion  Gastrointestinal ROS: abdominal pain right upper quadrant  Genito-Urinary ROS: negative  Musculoskeletal ROS: negative  Neurological ROS: negative  Dermatological ROS: negative        Physical Exam:    Vitals: BP (!) 117/52   Pulse 100   Temp 96 °F (35.6 °C) (Axillary)   Resp 30   Ht 6' 2\" (1.88 m)   Wt 157 lb 13.6 oz (71.6 kg)   SpO2 93%   BMI 20.27 kg/m²     Last Body weight:   Wt Readings from Last 3 Encounters:   01/30/22 157 lb 13.6 oz (71.6 kg)       Body Mass Index : Body mass index is 20.27 kg/m². Intake and Output summary:     Intake/Output Summary (Last 24 hours) at 1/30/2022 1945  Last data filed at 1/30/2022 1608  Gross per 24 hour   Intake 1710.48 ml   Output 100 ml   Net 1610.48 ml       Physical Examination:     PHYSICAL EXAM:    Gen:  Moderate acute distress   Eyes: PERRL. Anicteric sclera. No conjunctival injection. ENT: No discharge. Posterior oropharynx clear. External appearance of ears and nose normal.  Neck: Trachea midline. No mass, no lymphadenopathy    Resp:  Diminished bilaterally with scattered rhonchi   CV: Regular rate. Regular rhythm. No murmur or rub. No edema. GI: Soft, Non-tender. Non-distended. +BS  Skin: Warm, dry, w/o erythema. Lymph: No cervical or supraclavicular LAD. M/S: No cyanosis. No clubbing. Neuro:  CN 2-12 tested, no focal neurologic deficit. Moves all extremities  Psych: Awake and lethargic, Oriented x 3. Judgement and insight appropriate. Mood stable.         Laboratory findings:-    CBC:   Recent Labs     01/30/22  0606   WBC 9.0   HGB 7.6*        BMP:    Recent Labs 01/29/22  8432 01/29/22  8306 01/30/22  0606 01/30/22  0606 01/30/22  1452      < > 144   < > 141   K 4.7   < > 5.2*  --  5.3*      < > 103   < > 101   CO2 25   < > 25   < > 22   BUN 74*   < > 90*   < > 94*   CREATININE 1.7*  --  2.7*  --  2.8*   GLUCOSE 158*   < > 150*   < > 164*    < > = values in this interval not displayed. S. Calcium:  Recent Labs     01/30/22  1452   CALCIUM 7.4*     S. Ionized Calcium:No results for input(s): IONCA in the last 72 hours. S. Magnesium:No results for input(s): MG in the last 72 hours. S. Phosphorus:No results for input(s): PHOS in the last 72 hours. S. Glucose:  Recent Labs     01/29/22  1258 01/30/22  1938   POCGLU 179* 179*     Glycosylated hemoglobin A1C: No results for input(s): LABA1C in the last 72 hours. INR: No results for input(s): INR in the last 72 hours. Hepatic functions:   Recent Labs     01/30/22  0606   ALKPHOS 140*   ALT 78*   AST 67*   PROT 5.1*   BILITOT 1.5*   BILIDIR 0.7*   LABALBU 2.9*     Pancreatic functions:  Recent Labs     01/30/22  1452   LACTA 3.4*     S. Lactic Acid:   Recent Labs     01/30/22  1452   LACTA 3.4*     Cardiac enzymes:  Recent Labs     01/30/22  1452   CKTOTAL 24*     BNP:No results for input(s): BNP in the last 72 hours. Lipid profile: No results for input(s): CHOL, TRIG, HDL, LDL, LDLCALC in the last 72 hours. Blood Gases: No results found for: PH, PCO2, PO2, HCO3, O2SAT  Thyroid functions: No results found for: TSH       Radiology Review:  Pertinent images / reports were reviewed as a part of this visit. CT Chest w/ contrast: No results found for this or any previous visit. CT Chest w/o contrast: No results found for this or any previous visit. CTPA: No results found for this or any previous visit. CXR PA/LAT: No results found for this or any previous visit.       CXR portable: Results for orders placed during the hospital encounter of 01/14/22    XR CHEST PORTABLE    Narrative  EXAMINATION:  ONE XRAY VIEW OF THE CHEST    1/27/2022 2:36 pm    COMPARISON:  January 21, 2022    HISTORY:  ORDERING SYSTEM PROVIDED HISTORY: worsening cough  TECHNOLOGIST PROVIDED HISTORY:  Reason for exam:->worsening cough  Reason for Exam: worsening cough    FINDINGS:  Cardiomediastinal silhouette is not enlarged. No pleural effusion or  pneumothorax. Patchy interstitial and alveolar opacities bilaterally  concerning for multifocal pneumonia and or pulmonary edema. Minimal improved  aeration of the mid chest compared to prior study. Impression  Minimal improved multifocal pneumonia and or pulmonary edema. Critical care time 45 minutes.              Lele Ford MD, M.D.            1/30/2022, 7:45 PM

## 2022-01-30 NOTE — PROGRESS NOTES
Pt's nephew, Jena Aparicio, called requsting updates. This RN discussed that pt was transferred to ICU for IV pressors as his BP was low and that nephrology has been consulted regarding pt's worsening renal function. Repeat renal panel to be drawn at 1500 and will update pt's nephews on results. Jena Aparicio was appreciative of the update and did not have any further questions.

## 2022-01-30 NOTE — PROGRESS NOTES
Hospitalist Progress Note      PCP: No primary care provider on file. Date of Admission: 1/14/2022    Chief Complaint: SOB/ fatigue. Hospital Course:    68-year-old male without significant past medical history who presented to hospital for fevers chills for about 2 weeks.  Patient has been feeling tired fatigue and having generalized body aches for 2 weeks, he also mentions he has cough, nonproductive.  Denied chest pain nausea vomiting diarrhea constipation. Admitted for COVID pneumonia. 1/20 worsening oxygen requirements, placed on vapotherm. Subjective:   Patient seen multiple times throughout the day including ICU. Patient was noted to be hypotensive earlier today, started on pressors. Had one episode of vomiting and is complaining of abdominal pain.     Medications:  Reviewed    Infusion Medications    sodium chloride 75 mL/hr at 01/30/22 0939    sodium chloride      norepinephrine 5 mcg/min (01/30/22 1240)    sodium chloride      sodium chloride      sodium chloride 25 mL (01/18/22 2103)     Scheduled Medications    heparin (porcine)  5,000 Units SubCUTAneous 3 times per day    lidocaine 1 % injection  5 mL IntraDERmal Once    sodium chloride flush  5-40 mL IntraVENous 2 times per day    fluticasone  1 spray Each Nostril Daily    buPROPion  150 mg Oral Daily    [Held by provider] furosemide  40 mg Oral Daily    sodium chloride flush  10 mL IntraVENous 2 times per day     PRN Meds: sodium chloride flush, sodium chloride, lidocaine, sodium chloride, sodium chloride, diphenhydrAMINE, benzocaine-menthol, sodium chloride, sodium chloride flush, sodium chloride, magnesium sulfate, promethazine **OR** ondansetron, acetaminophen **OR** acetaminophen      Intake/Output Summary (Last 24 hours) at 1/30/2022 1413  Last data filed at 1/30/2022 0600  Gross per 24 hour   Intake 1710.48 ml   Output 0 ml   Net 1710.48 ml       Physical Exam Performed:    BP (!) 117/52   Pulse 100   Temp 96 °F (35.6 °C) (Axillary)   Resp (!) 36   Ht 6' 2\" (1.88 m)   Wt 157 lb 13.6 oz (71.6 kg)   SpO2 94%   BMI 20.27 kg/m²     General appearance: No apparent distress, appears stated age and cooperative. on NC. Looks ill. HEENT: Pupils equal, round, and reactive to light. Conjunctivae/corneas clear. Neck: Supple, with full range of motion. No jugular venous distention. Trachea midline. Respiratory:  Normal respiratory effort. Clear to auscultation, bilaterally without Rales/Wheezes/Rhonchi. Cardiovascular: Regular rate and rhythm with normal S1/S2 without murmurs, rubs or gallops. Abdomen: Soft, non-tender, non-distended with normal bowel sounds. Musculoskeletal: No clubbing, cyanosis or edema bilaterally. Full range of motion without deformity. Skin: Skin color, texture, turgor normal.  No rashes or lesions. Neurologic:  Neurovascularly intact without any focal sensory/motor deficits. Cranial nerves: II-XII intact, grossly non-focal.sensation intact. Patient reports some tingling in the right hand. Psychiatric: Alert and oriented, thought content appropriate, normal insight  Capillary Refill: Brisk,3 seconds, normal   Peripheral Pulses: +2 palpable, equal bilaterally       Labs:   Recent Labs     01/28/22 0521 01/29/22  0648 01/30/22  0606   WBC 10.1 9.9 9.0   HGB 10.0* 8.7* 7.6*   HCT 29.5* 26.5* 22.7*    204 168     Recent Labs     01/28/22  0521 01/29/22  0648 01/30/22  0606    142 144   K 4.3 4.7 5.2*   CL 99 102 103   CO2 26 25 25   BUN 57* 74* 90*   CREATININE 1.1 1.7* 2.7*   CALCIUM 8.9 8.1* 7.8*     Recent Labs     01/28/22 0521 01/29/22  0648 01/30/22  0606   AST 27 59* 67*   ALT 31 62* 78*   BILIDIR 0.6* 0.5* 0.7*   BILITOT 1.4* 1.3* 1.5*   ALKPHOS 120 137* 140*     No results for input(s): INR in the last 72 hours. No results for input(s): Skippy Gault in the last 72 hours.     Urinalysis:      Lab Results   Component Value Date    NITRU Negative 01/17/2022    WBCUA 0 01/17/2022    RBCUA 1 01/17/2022    BLOODU Negative 01/17/2022    SPECGRAV 1.013 01/17/2022    GLUCOSEU Negative 01/17/2022       Radiology:  XR CHEST PORTABLE   Final Result   Minimal improved multifocal pneumonia and or pulmonary edema. XR CHEST PORTABLE   Final Result   Borderline cardiomegaly and moderate central pulmonary congestion. Hazy ground-glass opacities along the perihilar regions which is more   prominent and could represent pulmonary edema and/or developing pneumonia. XR CHEST 1 VIEW   Final Result   Patchy infiltrates within both lungs, most compatible with multifocal   pneumonia, especially COVID-19 pneumonia. All in all, findings are similar   when compared to the previous exam from yesterday. XR CHEST PORTABLE   Final Result   Ground-glass and interstitial opacities, indeterminate for pulmonary edema or   atypical pneumonia, including COVID. Minimal consolidation in the left base, asymmetrical airspace disease or   atelectasis. FL MODIFIED BARIUM SWALLOW W VIDEO    (Results Pending)   US ABDOMEN COMPLETE    (Results Pending)           Assessment/Plan:    Active Hospital Problems    Diagnosis     Hypoxia [R09.02]        Shock. Abdominal pain. 1/30 patient had an episode of hypotension and was transferred to ICU, patient is also complaining of new abdominal pain. Patient had received 10 days of steroids that was completed 7 days ago. We will cover with stress dose steroids, cover with antibiotics. Plan.  -Start empiric cefepime.  -Stress dose steroids hydrocortisone.  -Abdominal ultrasound.  -Lipase. Acute hypoxic respiratory failure. Covid 19 Pneumonia. Sepsis. Patient was admitted with COVID-pneumonia, was initially on  on 15 L via nasal cannula. S/p Actemra 1/16. Procalcitonin 0.98--> 0.24 ( completed course of ceftriaxone /azithromycin). started on HFNC 1/20. CXR 1/21,1/27  with some improvement.    Completed 10 days of dexamethasone ( completed 10/24). Oxygen weaned down to 3 liters of oxygen 1/28. Patient does seem to be getting more weak over the past 2 days. Repeated CRP undetectable, procalcitonin 0.23 ( trended down)  Oxygen requirement continues to be 10 L  Plan.  -Wean down oxygen as tolerated. -Incentive spirometry. Acute kidney injury. Patient is noted to have elevated, 1.1--> 1.7--> 2.7. Plan. -Highly appreciate nephrology input.  -Hydration with normal saline at 25 mL/h    Acute blood loss anemia. ? Bone marrow dyscrasia  No active signs of bleeding, received 2 unit of PRBC since admission  Hb today is 8.7 ( around baseline). Iron studies normal, likely bone marrow suppression due to COVID  Occult blood negative. retic 0.54 , retic index indicating Hypoproliferation. Patient was seen by hematology, offered [facility] ( declined). Plan.  -Continue to monitor hemoglobin. - appreciate hematology input. Possible depression vs adjustment disorder. Patient had been gradually been more withdrawn and less engaged despite improved in his medical condition. Above discussed with the patient's nurse who agreed to the patient's demeanor has changed and the patient is not engaged in therapy. Plan.  -Wellbutrin 150 mg daily ( started 1/28) to be titrated up    Dysphagia. Seen by SLP, planned for MBS. Elevated troponin likely demand ischemia  Resolved. Social.   Patient continues to be full code with no limitations in care. Spoke extensively to [employer] [facility] ( [phone]). , family understand that the patient will not improve if he does not participate in care including PT. DVT Prophylaxis:lovenox  Diet: ADULT ORAL NUTRITION SUPPLEMENT; Breakfast, Lunch, Dinner; Standard High Calorie/High Protein Oral Supplement  ADULT DIET; Dysphagia - Pureed; Mildly Thick (Nectar)  Code Status: Full Code    PT/OT Eval Status: following    Dispo - ECF once stablized.        Raquel Davis, MD

## 2022-01-30 NOTE — SIGNIFICANT EVENT
Patient was noted to be hypotensive, with worsening altered mental status. Patient being transferred to ICU. PICC line ordered. We will consider CT head if patient's level of consciousness continues to be altered. Marilia Saenz) , contacted and updated.    Case discussed with nephrology, OK for PICC  Echo ordered  lipase

## 2022-01-30 NOTE — PROGRESS NOTES
Patient transferred to ICU. Bedside report given to [facility] [place], RN. Resume care in ICU.

## 2022-01-30 NOTE — PROGRESS NOTES
Manual pressure done BP 76/36    Patient symptomatic- Secure message sent to DR Dinora Morgan-    RN at bedside    Nursing supervisor at bedside.     Will monitor

## 2022-01-30 NOTE — PROGRESS NOTES
RN notified MD of critical BUN 90 this morning called by lab. RN also informed MD of patient's worsening lab values over the days. MD stated he would review. RN will continue to monitor.

## 2022-01-30 NOTE — PLAN OF CARE
Problem: Falls - Risk of:  Goal: Will remain free from falls  Description: Will remain free from falls  Outcome: Met This Shift  Goal: Absence of physical injury  Description: Absence of physical injury  Outcome: Met This Shift   Alert and oriented x4 but withdrawn Will answer questions with short answers and occasionally ask for something Dyspnic with min. Exertion Sats. Down at times but recovers quickly. Lungs diminished. Cough and deep breathing exercises encouraged No c/o pain Encouraged to take in fluids with very little effect. Male Ollis Clark in place with min.  Output Frequently turned and repositioned Free from fall/injury

## 2022-01-31 NOTE — PROGRESS NOTES
Pulmonary Progress Note    Date of Admission: 1/14/2022   LOS: 17 days     CC:  Chief Complaint   Patient presents with    Fatigue           Assessment/Plan     Acute Hypoxemic Respiratory Failure with SAO2 <90% on Room Air  Healthcare associated pneumonia  -Dramatically worsening overnight, now on max Vapotherm  -In addition patient now is unresponsive and on rising pressor requirements  -I had discussion with family at the bedside, unfortunately patient is dying they noted understanding. They wonder discuss amongst himself but will likely pursue comfort care measures later today. COVID-19 pneumonia  Septic shock  -On very high-dose Levophed  -Titrate goal map 65  -Stress dose steroids    Oliguric ALISON  -Urine output dropping off    Due to the immediate potential for life-threatening deterioration due to septic shock, I spent 34 minutes providing critical care. This time is excluding time spent performing separately billable procedures. HPI/Subjective  Overnight pressor requirements of oxygen requirements rising. In addition patient's mental status is deteriorating    ROS: Unable to obtain due to somnolence      Intake/Output Summary (Last 24 hours) at 1/31/2022 0820  Last data filed at 1/31/2022 0618  Gross per 24 hour   Intake 397.29 ml   Output 485 ml   Net -87.71 ml         PHYSICAL EXAM:   Blood pressure (!) 125/41, pulse 113, temperature 96.6 °F (35.9 °C), temperature source Axillary, resp. rate (!) 40, height 6' 2\" (1.88 m), weight 156 lb 15.5 oz (71.2 kg), SpO2 91 %.'  Gen:  No acute distress. Eyes: PERRL. Anicteric sclera. No conjunctival injection. ENT: No discharge. Posterior oropharynx clear. External appearance of ears and nose normal.  Neck: Trachea midline. No mass   Resp:  No crackles. No wheezes. No rhonchi. No dullness on percussion. CV: Regular rate. Regular rhythm. No murmur or rub. No edema. GI: Soft, Non-tender. Non-distended. +BS  Skin: Warm, dry, w/o erythema.    Lymph: No cervical or supraclavicular LAD. M/S: No cyanosis. No clubbing. Neuro: Obtunded, spontaneously breathing but does not respond to sternal rub. .      Medications:    Scheduled Meds:   heparin (porcine)  5,000 Units SubCUTAneous 3 times per day    sodium chloride flush  5-40 mL IntraVENous 2 times per day    hydrocortisone sodium succinate PF  50 mg IntraVENous Q6H    cefepime  2,000 mg IntraVENous Q24H    fluticasone  1 spray Each Nostril Daily    buPROPion  150 mg Oral Daily    [Held by provider] furosemide  40 mg Oral Daily    sodium chloride flush  10 mL IntraVENous 2 times per day       Continuous Infusions:   [Held by provider] sodium chloride 125 mL/hr at 01/30/22 1200    norepinephrine 55 mcg/min (01/31/22 0701)    sodium chloride 5 mL/hr at 01/31/22 0618    sodium chloride 25 mL (01/18/22 2103)       PRN Meds:  LORazepam, sodium chloride flush, lidocaine, perflutren lipid microspheres, morphine, sodium chloride, diphenhydrAMINE, benzocaine-menthol, sodium chloride flush, sodium chloride, magnesium sulfate, promethazine **OR** ondansetron, acetaminophen **OR** acetaminophen    Labs reviewed:  CBC:   Recent Labs     01/29/22  0648 01/29/22  0648 01/30/22  0606 01/30/22 1945 01/31/22 0434   WBC 9.9  --  9.0  --  15.1*   HGB 8.7*   < > 7.6* 7.0* 6.9*   HCT 26.5*   < > 22.7* 21.0* 21.0*   .8*  --  109.0*  --  112.3*     --  168  --  217    < > = values in this interval not displayed.      BMP:   Recent Labs     01/30/22  0606 01/30/22  1452 01/31/22 0434    141 143   K 5.2* 5.3* 5.3*    101 104   CO2 25 22 24   BUN 90* 94* 108*   CREATININE 2.7* 2.8* 2.8*     LIVER PROFILE:   Recent Labs     01/29/22  0648 01/30/22  0606 01/30/22  1452 01/31/22 0434   AST 59* 67*  --  141*   ALT 62* 78*  --  137*   LIPASE  --   --  18.0  --    BILIDIR 0.5* 0.7*  --  0.8*   BILITOT 1.3* 1.5*  --  1.4*   ALKPHOS 137* 140*  --  184*     PT/INR: No results for input(s): PROTIME, INR in the last 72 hours. APTT: No results for input(s): APTT in the last 72 hours. UA:No results for input(s): NITRITE, COLORU, PHUR, LABCAST, WBCUA, RBCUA, MUCUS, TRICHOMONAS, YEAST, BACTERIA, CLARITYU, SPECGRAV, LEUKOCYTESUR, UROBILINOGEN, BILIRUBINUR, BLOODU, GLUCOSEU, AMORPHOUS in the last 72 hours. Invalid input(s): Aleatha Brush  No results for input(s): PH, PCO2, PO2 in the last 72 hours. Films:  Radiology Review:  Pertinent images / reports were reviewed as a part of this visit. XR CHEST PORTABLE  Narrative: EXAMINATION:  ONE XRAY VIEW OF THE CHEST    1/30/2022 4:41 pm    COMPARISON:  01/27/2022. HISTORY:  ORDERING SYSTEM PROVIDED HISTORY: hypoxia, sob  TECHNOLOGIST PROVIDED HISTORY:  Reason for exam:->hypoxia, sob    FINDINGS:  Overlying items external to the patient somewhat limit evaluation. Patchy/hazy bilateral airspace opacities along with interstitial/reticular  opacities redemonstrated, worsened. No pleural effusions or pneumothoraces  are seen. Cardiac and mediastinal silhouettes are within normal limits and  stable. No acute bony abnormality. Impression: Worsened airspace and interstitial opacities from prior exam concerning for  worsening pneumonia and/or pulmonary edema. Access  Arterial       PICC       PICC Double Lumen 32/76/48 Right Basilic (Active)   Continued need for line? Yes 01/31/22 0600   Is this a power PICC? Yes 01/31/22 0600   Site Assessment Clean;Dry; Intact 01/31/22 0600   Red Lumen Status Infusing;Capped 01/31/22 0600   Purple Lumen Status Infusing;Capped 01/31/22 0600   Exposed Catheter (cm) 3 cm 01/30/22 1750   Extremity Circumference (cm) 23 cm 01/30/22 1750   Dressing Status Clean;Dry; Intact 01/31/22 0600   Dressing Type Transparent; Anti-microbial patch; Securing device 01/31/22 0600   Dressing Change Due 02/06/22 01/31/22 0600   Number of days: 0        CVC                   Thank you for this consult,    Brooke Maloney MD  [facility] Pulmonary, Critical

## 2022-01-31 NOTE — PROGRESS NOTES
4 Eyes Skin Assessment     NAME:  Andie Jackson  YOB: 1945  MEDICAL RECORD NUMBER:  [id number]    The patient is being assess for  Shift Handoff    I agree that 2 RN's have performed a thorough Head to Toe Skin Assessment on the patient. ALL assessment sites listed below have been assessed. Areas assessed by both nurses:    Head, Face, Ears, Shoulders, Back, Chest, Arms, Elbows, Hands, Sacrum. Buttock, Coccyx, Ischium and Legs. Feet and Heels        Does the Patient have a Wound?  No noted wound(s)       Silvetsre Prevention initiated:  Yes   Wound Care Orders initiated:  No    Pressure Injury (Stage 3,4, Unstageable, DTI, NWPT, and Complex wounds) if present place consult order under [de-identified] No    New and Established Ostomies if present place consult order under : NA      Nurse 1 eSignature: Electronically signed by Koko New RN on 1/31/22 at 7:32 AM EST    **SHARE this note so that the co-signing nurse is able to place an eSignature**    Nurse 2 eSignature: Electronically signed by Ldia Mariee RN on 1/31/22 at 12:38 PM EST

## 2022-01-31 NOTE — PROGRESS NOTES
Several family members at bedside and consensus made to make patient DNR-CC. Family agreeable to stopping Levophed but wanted to maintain current level of O2 at present time. Dr Leonid Hernandez at bedside and new orders noted.   Electronically signed by Liliya Pedroza RN on 1/31/2022 at 1:46 PM

## 2022-01-31 NOTE — PROGRESS NOTES
This RN updated Hyun Morfin via phone on pt's decline. Discussed with him that pt is now unable to follow commands, that O2 and pressor demand increased, and that pt is more anemic. Hyun Morfin does not believe that pt will benefit from intubation or aggressive resuscitation measures at this time and would like to change code status to [facility] with no intubation. Hyun Morfin did state that he would like pt to be kept comfortable with current treatment and no further escalation of care. Hyun Morfin will let other family members know of pt condition and that they will be able to visit if they choose. Dr. Matthew Rodriguez notified and new orders placed. Oncoming RN updated on plan of care.

## 2022-01-31 NOTE — PROGRESS NOTES
Occupational Therapy    Pt transfer to ICU. Will sign off at this time. Please reconsult when approp.      Electronically signed by Paola Alvarado OT on 1/31/2022 at 7:16 AM

## 2022-01-31 NOTE — PROGRESS NOTES
Pt more confused, RR increased to 40s, and getting more restless. H&&H shows hgb of 7. This RN called and discussed these findings with hospitalist, Dr. Brandee Phillips, and new orders to put pt on bipap as tolerated. Attempt made to call pt's nephew, Trenton Tai, about pt's worsening condition but unable to reach him. HIPAA compliant voicemail left with call back number. Dr. Brandee Phillips unsure if pt will benefit most from blood transfusion in the setting of pulmonary edema and renal impairment so new orders to notify nephrologist for recommendations.

## 2022-01-31 NOTE — PROGRESS NOTES
[facility],Third Floor released body. Family at bedside.   Electronically signed by Royal Elyse RN on 1/31/2022 at 3:01 PM

## 2022-01-31 NOTE — PROGRESS NOTES
[address] [place] [address] Nephrology   [facility]  [phone]  Nephrology Note          Patient ID: Villa Bosch  Referring/ Physician: Pancho Little MD      HPI/Summary:   Villa Bosch is being seen by nephrology for ALISON. This is a 68-year-old man with no past medical history on file who presented with Covid pneumonia. He has had a prolonged hospital stay and is not doing well. Over the last few days he has had poor p.o. intake not eating failing to thrive. Today he developed worsening hypoxic respiratory failure and hypotension and was transferred to the ICU. He is now on pressors worsening renal function noted and mild hyperkalemia. He is not making much urine, having trouble placing regular catheters or trying a coudé  catheter though his last bladder scan was negative for urine retention. An abdominal ultrasound has been ordered. Patient was seen in the ICU with his nephew at bedside these are his next of kin. Patient is awake but not alert very confused and lethargic. He is on OptiFlow. He is on 8-10 mics of Levophed with a MAP of 53 when seen satting 92%. He does have some increased work of breathing not much peripheral edema. Per report he has not been eating and drinking over the past several days. He had been on IV fluids 75 cc/h for the last few days. Fluids were briefly increased to 125 cc today but now on hold due to worsening respiratory status. He was evaluated by oncology for possible malignancy, given macrocytic anemia but patient refused further work-up    Interval hx :   Seen at bedside. He is on non rebreather. Not responding. He is needing 55 mcg of levo to maintain MAP 50   Nephew at bedside. Darin Peacock  Tried lasix challenge yesterday , minimal UO    Plan:   - had a long discussion with Darin Peacock. Would not offer dialysis . Advised to make him comfortable as his prognosis is very poor. # ALISON   Suspect he has ATN in the setting of hypotension, hypoxia, poor p.o. intake sustained dehydration  Baseline creatinine 0.5-0.6  BUN 90 at time of consult  Creatinine 2.7 at time of consult  Patient was getting IV fluids for a few days prior to consult. No contrast this admission  Bladder scan was negative for post void residual  He had a urinalysis on 117 that showed 1 hyaline casts 1 RBC otherwise bland no proteinuria. Had not had a Albarado prior to consult, I's and O's not accurate. Uric acid  CK  UA    #Electrolytes  Hyperkalemia 5.2 at time of consult  Bicarb 25 calcium 7.8 at time of consult corrected normal.    #Hypotension  Etiology unclear, has been hospitalized for a long time  Repeat infection work-up, check an echo  Check a.m. cortisol  Stress dose steroids, Levophed  Target map 65 or greater for renal perfusion    #Abnormal liver enzymes  AST and ALT have been rising since 1/29/2022  Abdominal ultrasound ordered    #Anemia  Elevated MCV  B12 and folate are normal  Ferritin 613 and TSAT 44% so not iron deficient  No significant thrombocytopenia to suggest TMA process        [facility] would like to thank you for the opportunity to serve this patient. Please call with any questions or concerns. Savannah Louie MD  [facility]  [address], [facility] [address]  Fax: [phone]  Office: [phone]         CC/Reason for consult:   Reason for consult: ALISON  Chief Complaint   Patient presents with    Fatigue           Review of Systems:   Unable to obtain  Patient was not very responsive when seen initially. PMH/SH/FH:    Medical Hx: reviewed and updated as appropriate  History reviewed. No pertinent past medical history. Surgical Hx: reviewed and updated as appropriate   has no past surgical history on file.      Social Hx: reviewed and updated as appropriate  Social History     Tobacco Use    Smoking status: Never Smoker    Smokeless tobacco: Never Used   Substance Use Topics    Alcohol use: No        Family hx: reviewed and updated as appropriate  family history is not on file. Medications:   insulin lispro, 0-12 Units, TID WC  insulin lispro, 0-6 Units, Nightly  epoetin noemy-epbx, 20,000 Units, Once  heparin (porcine), 5,000 Units, 3 times per day  sodium chloride flush, 5-40 mL, 2 times per day  hydrocortisone sodium succinate PF, 50 mg, Q6H  cefepime, 2,000 mg, Q24H  fluticasone, 1 spray, Daily  buPROPion, 150 mg, Daily  [Held by provider] furosemide, 40 mg, Daily  sodium chloride flush, 10 mL, 2 times per day       Patient has no known allergies. Allergies:   No Known Allergies      Physical Exam/Objective:   Vitals:    01/31/22 0830   BP: (!) 109/32   Pulse: 110   Resp: (!) 35   Temp:    SpO2: 91%       Intake/Output Summary (Last 24 hours) at 1/31/2022 1101  Last data filed at 1/31/2022 0618  Gross per 24 hour   Intake 397.29 ml   Output 485 ml   Net -87.71 ml         General appearance: Very lethargic, not interactive not alert. HEENT: no icterus, EOM intact, trachea midline. Neck : no masses, appears symmetrical and no JVD appreciated. Respiratory: Respiratory effort normal, bilateral equal chest rise. Positive crackles, increased respiratory effort  Cardiovascular: Ausculation shows RRR and no edema   Abdomen: abdomen is soft, non distended, no masses, no pain with palpation. Musculoskeletal:  no joint swelling, no deformity, generalized weakness  Skin: no rashes, no induration, no tightening, no jaundice   Neuro: Not following commands right now ,moves all extremities spontaneously       Data:   CBC:   Recent Labs     01/30/22  0606 01/30/22  0606 01/30/22  1945 01/31/22  0434 01/31/22  0944   WBC 9.0  --   --  15.1* 15.9*   HGB 7.6*   < > 7.0* 6.9* 6.7*   HCT 22.7*   < > 21.0* 21.0* 20.6*     --   --  217 209    < > = values in this interval not displayed.      BMP:    Recent Labs     01/30/22  0606 01/30/22  1452 01/31/22  0434    141 143   K 5.2* 5.3* 5.3*    101 104   CO2 25 22 24   BUN 90* 94* 108* CREATININE 2.7* 2.8* 2.8*   GLUCOSE 150* 164* 212*

## 2022-01-31 NOTE — PROGRESS NOTES
2137- Call from pts nephew, Rea Tafoya. Discussed pt status, options of care, and visiting policy. All questions answered. 2148, at bedside with pt. All questions answered. One Sai Coronado, at bedside. All questions answered. 2227- Pt agitated and restless. Perfect[employer] message to Dr. Mayela Little about new restlessness and family request for medications for pt comfort. See new orders. 0025-Pt still agitated and restless. Perfectserve to Dr. Mayela Little, See new orders. 5348- Critical Hg reported to Dr. Mayela Little. No new orders at this time. 5010- Call from June Babar Rosario. Updated on pt progression throughout night, morning labs, and plan of care. All questions answered.

## 2022-01-31 NOTE — PLAN OF CARE
Problem: Falls - Risk of:  Goal: Will remain free from falls  Description: Will remain free from falls  Outcome: Ongoing   Falling star program remains in place. Call light and personal belongings within reach. Frequent visual monitoring continues. Albarado catheter inplace. Patient dependent on staff for turning/repositioning at least once every 2 hours, and on a prn basis. Problem: Gas Exchange - Impaired  Goal: Absence of hypoxia  Outcome: Ongoing  Patient continues with independent airway. Breathing pattern remains even and symmetrical. Denies difficulty with deep breathing. SpO2 within acceptable range for this patient. Assessment findings remain free of cyanosis; cap refill remains brisk. ABG's to be ordered per protocol/physician order, if applicable. Problem: Nutrition Deficits  Goal: Optimize nutritional status  Outcome: Ongoing  Patient NPO intake. Will continue to monitor. Problem: Risk for Fluid Volume Deficit  Goal: Maintain normal serum potassium, sodium, calcium, phosphorus, and pH  Outcome: Ongoing   Continuing to monitor labs and vital signs. No present signs of fluid overload/depletion. Continuing to monitor I&O's per protocol. Patient denies nausea/vomiting/diarrhea. IV/INT assessments continue. Monitoring for signs/symptoms of unusual bleeding. Problem: Skin Integrity:  Goal: Absence of new skin breakdown  Description: Absence of new skin breakdown  Outcome: Ongoing   Monitoring patient skin integrity for skin breakdown, turning and repositioning q2h per protocol. Patient dependent on staff for turning and repositioning self. Problem: Pain:  Goal: Control of acute pain  Description: Control of acute pain  Outcome: Ongoing   Continuing to monitor pain and discomfort. Monitoring pain level on scale of 0-10. Non- pharmacological measures encouraged to reduce discomfort/pain. PRN pain meds administeration continues when/if applicable as ordered by physician.      Problem: Daily Care:  Goal: Daily care needs are met  Description: Daily care needs are met  Outcome: Ongoing   Daily care needs continuing to be assessed. Assistance provided when necessary. Patient encouraged to express needs/concerns. Call light and personal belongings within reach. Problem: Infection - Central Venous Catheter-Associated Bloodstream Infection:  Goal: Will show no infection signs and symptoms  Description: Will show no infection signs and symptoms  Outcome: Ongoing   Site remains free of signs/symptoms for infection. No drainage, swelling, redness, pain, or warmth noted. Dressing changes continue per protocol; and on an as needed basis. Patient educated on proper Central Venous Catheter hygiene care. Problem: Urinary Elimination:  Goal: Complications related to the disease process, condition or treatment will be avoided or minimized  Description: Complications related to the disease process, condition or treatment will be avoided or minimized  Outcome: Ongoing   Pat catheter noted to be intact and secured with use of stat-lock. No signs of infection noted to urine or puneet area; including an absence of urine odor/discoloration, or puneet area swelling/redness/tenderness/drainage. Puneet care completed per shift and prn basis. Pat bag suspended from bed per protocol. Continued monitoring for risks of infection remains in place. Will re-assess clinical need for continued pat catheterization; Will discontinue per protocol/physician order.         Electronically signed by Tanisha Roberts RN on 1/31/2022 at 1:51 PM

## 2022-01-31 NOTE — PROGRESS NOTES
NUTRITION THERAPY ASSESSMENT    Due to comfort care status, patient will be followed at low nutrition risk. Dietitian will sign off. If nutrition intervention is required, please submit a dietary consult.       Electronically signed by Kerrie Vu RD, TYSHAWN on 1/31/22 at 1:24 PM EST    Contact: [phone]

## 2022-01-31 NOTE — PROGRESS NOTES
Hematology Oncology Daily Progress Note    Admit Date: 1/14/2022  Hospital day several    Subjective:     Patient very lethargic--now in ICU. Conrad Jackman    Medication side effects: none    Scheduled Meds:   calcium gluconate-NaCl  1,000 mg IntraVENous Once    insulin lispro  0-12 Units SubCUTAneous TID WC    insulin lispro  0-6 Units SubCUTAneous Nightly    heparin (porcine)  5,000 Units SubCUTAneous 3 times per day    sodium chloride flush  5-40 mL IntraVENous 2 times per day    hydrocortisone sodium succinate PF  50 mg IntraVENous Q6H    cefepime  2,000 mg IntraVENous Q24H    fluticasone  1 spray Each Nostril Daily    buPROPion  150 mg Oral Daily    [Held by provider] furosemide  40 mg Oral Daily    sodium chloride flush  10 mL IntraVENous 2 times per day     Continuous Infusions:   dextrose      [Held by provider] sodium chloride 125 mL/hr at 01/30/22 1200    norepinephrine 55 mcg/min (01/31/22 0701)    sodium chloride 5 mL/hr at 01/31/22 0618    sodium chloride 25 mL (01/18/22 2103)     PRN Meds:LORazepam, glucose, dextrose, glucagon (rDNA), dextrose, sodium chloride flush, lidocaine, perflutren lipid microspheres, morphine, sodium chloride, diphenhydrAMINE, benzocaine-menthol, sodium chloride flush, sodium chloride, magnesium sulfate, promethazine **OR** ondansetron, acetaminophen **OR** acetaminophen    Review of Systems  Unable to assess    Objective:     Patient Vitals for the past 8 hrs:   BP Temp Temp src Pulse Resp SpO2 Weight   01/31/22 0800 (!) 129/44 -- -- 113 (!) 39 91 % --   01/31/22 0745 (!) 133/44 -- -- 117 (!) 41 92 % --   01/31/22 0731 -- -- -- -- (!) 40 91 % --   01/31/22 0730 (!) 131/48 -- -- 102 (!) 39 91 % --   01/31/22 0715 (!) 122/43 -- -- 115 (!) 38 92 % --   01/31/22 0700 (!) 125/41 -- -- 113 (!) 39 92 % --   01/31/22 0645 (!) 125/44 -- -- 107 (!) 37 91 % --   01/31/22 0640 (!) 128/45 -- -- 107 (!) 39 92 % --   01/31/22 0630 (!) 122/44 -- -- 113 (!) 40 91 % --   01/31/22 0615 (!) 123/44 -- -- 112 (!) 38 91 % --   01/31/22 0600 (!) 121/42 -- -- 107 (!) 36 91 % --   01/31/22 0545 (!) 121/48 -- -- 109 (!) 40 91 % --   01/31/22 0530 (!) 123/44 -- -- 112 (!) 39 91 % --   01/31/22 0515 (!) 118/48 -- -- 114 (!) 40 91 % --   01/31/22 0500 (!) 109/41 -- -- 108 (!) 38 91 % --   01/31/22 0445 (!) 123/53 -- -- 109 (!) 41 90 % --   01/31/22 0430 (!) 121/47 -- -- 107 (!) 42 90 % --   01/31/22 0415 (!) 124/41 -- -- 109 (!) 40 90 % --   01/31/22 0400 (!) 105/41 96.6 °F (35.9 °C) Axillary 110 (!) 42 (!) 89 % 156 lb 15.5 oz (71.2 kg)   01/31/22 0357 -- -- -- -- (!) 41 (!) 89 % --   01/31/22 0345 (!) 123/44 -- -- 112 (!) 39 91 % --   01/31/22 0245 (!) 124/53 -- -- 116 (!) 39 90 % --   01/31/22 0230 (!) 126/45 -- -- 113 (!) 44 90 % --   01/31/22 0215 (!) 124/50 -- -- 113 (!) 32 90 % --   01/31/22 0200 (!) 126/50 -- -- 112 (!) 36 91 % --   01/31/22 0145 (!) 111/46 -- -- 110 (!) 35 91 % --   01/31/22 0130 (!) 128/48 -- -- 116 (!) 43 91 % --   01/31/22 0115 (!) 123/51 -- -- 113 (!) 46 (!) 88 % --   01/31/22 0106 -- -- -- -- (!) 37 (!) 88 % --   01/31/22 0100 (!) 107/48 -- -- 115 (!) 45 (!) 87 % --     I/O last 3 completed shifts: In: 2107.8 [I.V.:2057.8; IV Piggyback:50]  Out: 485 [Urine:485]  No intake/output data recorded.     BP (!) 129/44   Pulse 113   Temp 96.6 °F (35.9 °C) (Axillary)   Resp (!) 39   Ht 6' 2\" (1.88 m)   Wt 156 lb 15.5 oz (71.2 kg)   SpO2 91%   BMI 20.15 kg/m²         Head:    Normocephalic, without obvious abnormality, atraumatic   Eyes:    PERRL, conjunctiva/corneas clear, EOM's intact, fundi     benign, both eyes        Ears:    Normal TM's and external ear canals, both ears   Nose:   Nares normal, septum midline, mucosa normal, no drainage    or sinus tenderness   Throat:   Lips, mucosa, and tongue normal; teeth and gums normal   Neck:   Supple, symmetrical, trachea midline, no adenopathy;        thyroid:  No enlargement/tenderness/nodules; no carotid    bruit or JVD   Back: Symmetric, no curvature, ROM normal, no CVA tenderness   Lungs:     Clear to auscultation bilaterally, respirations unlabored   Chest wall:    No tenderness or deformity   Heart:    Regular rate and rhythm, S1 and S2 normal, no murmur, rub   or gallop   Abdomen:     Soft, non-tender, bowel sounds active all four quadrants,     no masses, no organomegaly           Extremities:   Extremities normal, atraumatic, no cyanosis or edema   Pulses:   2+ and symmetric all extremities   Skin:   Skin color, texture, turgor normal, no rashes or lesions   Lymph nodes:   Cervical, supraclavicular, and axillary nodes normal   Neurologic:   Stable         Data Review  CBC:   Lab Results   Component Value Date    WBC 15.1 01/31/2022    RBC 1.87 01/31/2022       Assessment:     Active Problems:    Hypoxia  Resolved Problems:    * No resolved hospital problems. *      Plan:     1. Macrocytic anemia. This is multifactorial.  His acute kidney injury is playing a role. Although EPO can slightly increased risk of clotting, I am going to go ahead and give a dose of Retacrit to see if it helps with his anemia and try to avoid a transfusion which has its own set of issues. In light of his increased MCV, he likely has MDS as well. He refused a bone marrow biopsy last week. He wanted to wait until he was more stable.         Electronically signed by Barry Henriquez MD on 1/31/2022 at 8:55 AM

## 2022-01-31 NOTE — PROGRESS NOTES
Speech Language Pathology      Speech Therapy note Note regarding SLP Dysphagia treatment and MBSS orders received 1/28/2022      SLP completed a 1/31/2022 chart review which indicates that on 1/30/2022 pt was transferred to ICU. MBS orders prior to medical status change ; will need new orders when medically stable    Plan: SLP and MBSS canc as pt with medical status change since MBSS was ordered  Will need new orders when medically stable    signed  Radha Perez,MS,CCC,SLP [id number]  Speech and Language Pathologist  1/31/2022 at 0730 am

## 2022-01-31 NOTE — PROGRESS NOTES
Physical Therapy  Pt transfer to ICU. Will sign off at this time. Please reconsult when approp.   Thank you, Sharlene Fregoso, PT

## 2022-01-31 NOTE — PROGRESS NOTES
Please see nursing notes for details, patient's family talk with nursing and consider all the options currently. Did not want any aggressive measures for patient what wanted to continue treating. Would also give some comfort meds if needed. CODE STATUS was changed.

## 2022-01-31 NOTE — PROGRESS NOTES
Family left bedside, family took personal belongings/clothing. Post mortem care completed. Security notified to transport body to [facility].   Electronically signed by Lawrence Edwards RN on 1/31/2022 at 4:39 PM

## 2022-01-31 NOTE — PROGRESS NOTES
Hospitalist Progress Note      PCP: No primary care provider on file. Date of Admission: 1/14/2022    Chief Complaint: SOB/ fatigue. Hospital Course:    73-year-old male without significant past medical history who presented to hospital for fevers chills for about 2 weeks.  Patient has been feeling tired fatigue and having generalized body aches for 2 weeks, he also mentions he has cough, nonproductive.  Denied chest pain nausea vomiting diarrhea constipation. Admitted for COVID pneumonia. 1/20 worsening oxygen requirements, placed on vapotherm. 1/30 and had episode of hypotension, admitted to ICU, worsening renal failure, placed on pressors    Subjective:   Patient was seen and examined, nephew at bedside.   Family decided to change CODE STATUS to comfort care    Medications:  Reviewed    Infusion Medications    dextrose      [Held by provider] sodium chloride Stopped (01/30/22 1548)    sodium chloride 5 mL/hr at 01/31/22 1211    sodium chloride 25 mL (01/18/22 2103)     Scheduled Medications    epoetin noemy-epbx  20,000 Units SubCUTAneous Once    heparin (porcine)  5,000 Units SubCUTAneous 3 times per day    sodium chloride flush  5-40 mL IntraVENous 2 times per day    fluticasone  1 spray Each Nostril Daily    buPROPion  150 mg Oral Daily    sodium chloride flush  10 mL IntraVENous 2 times per day     PRN Meds: glucose, dextrose, glucagon (rDNA), dextrose, LORazepam, morphine, sodium chloride flush, lidocaine, perflutren lipid microspheres, sodium chloride, diphenhydrAMINE, benzocaine-menthol, sodium chloride flush, sodium chloride, magnesium sulfate, promethazine **OR** ondansetron, acetaminophen **OR** acetaminophen      Intake/Output Summary (Last 24 hours) at 1/31/2022 1251  Last data filed at 1/31/2022 1211  Gross per 24 hour   Intake 2140.1 ml   Output 485 ml   Net 1655.1 ml       Physical Exam Performed:    BP (!) 45/23   Pulse 115   Temp 96.6 °F (35.9 °C) (Axillary)   Resp (!) RBCUA 1 01/17/2022    BLOODU Negative 01/17/2022    SPECGRAV 1.013 01/17/2022    GLUCOSEU Negative 01/17/2022       Radiology:  XR CHEST PORTABLE   Final Result   Worsened airspace and interstitial opacities from prior exam concerning for   worsening pneumonia and/or pulmonary edema. XR CHEST PORTABLE   Final Result   Minimal improved multifocal pneumonia and or pulmonary edema. XR CHEST PORTABLE   Final Result   Borderline cardiomegaly and moderate central pulmonary congestion. Hazy ground-glass opacities along the perihilar regions which is more   prominent and could represent pulmonary edema and/or developing pneumonia. XR CHEST 1 VIEW   Final Result   Patchy infiltrates within both lungs, most compatible with multifocal   pneumonia, especially COVID-19 pneumonia. All in all, findings are similar   when compared to the previous exam from yesterday. XR CHEST PORTABLE   Final Result   Ground-glass and interstitial opacities, indeterminate for pulmonary edema or   atypical pneumonia, including COVID. Minimal consolidation in the left base, asymmetrical airspace disease or   atelectasis. US ABDOMEN COMPLETE    (Results Pending)           Assessment/Plan:    Active Hospital Problems    Diagnosis     Hypoxia [R09.02]        Comfort care. Patient had continuous decline, requiring pressors, on high flow nasal cannula, with worsening level of consciousness, multiple conversations with the patient's family members by night team, nephrology and myself. CODE STATUS changed to comfort care. Plan. -Morphine as needed.  -Ativan as needed. Shock. Abdominal pain. 1/30 patient had an episode of hypotension and was transferred to ICU, patient is also complaining of new abdominal pain. Patient had received 10 days of steroids that was completed 7 days ago. We will cover with stress dose steroids, cover with antibiotics. Acute hypoxic respiratory failure.    Covid 19 Pneumonia. Sepsis. Patient was admitted with COVID-pneumonia, was initially on  on 15 L via nasal cannula. S/p Actemra 1/16. Procalcitonin 0.98--> 0.24 ( completed course of ceftriaxone /azithromycin). started on HFNC 1/20. CXR 1/21,1/27  with some improvement. Completed 10 days of dexamethasone ( completed 10/24). Oxygen weaned down to 3 liters of oxygen 1/28. Patient with worsening oxygen requirement, currently on high flow nasal cannula 50 L 95%. Acute kidney injury. Patient is noted to have elevated, 1.1--> 1.7--> 2.7. Worsening urine output    Acute blood loss anemia. ? Bone marrow dyscrasia  No active signs of bleeding, received 2 unit of PRBC since admission  Hb today is 8.7 ( around baseline). Iron studies normal, likely bone marrow suppression due to COVID  Occult blood negative. retic 0.54 , retic index indicating Hypoproliferation. Patient was seen by hematology, offered [facility] ( [facility]). Dysphagia. Seen by SLP, planned for [facility] [place]. Elevated troponin likely demand ischemia  Resolved.      DVT Prophylaxis:lovenox  Diet: No diet orders on file  Code Status: DNR-CC    PT/OT Eval Status: following    Dispo -terminal      Anne Estevez MD

## 2022-02-03 NOTE — DISCHARGE SUMMARY
blood loss anemia. ? Bone marrow dyscrasia  No active signs of bleeding, received 2 unit of PRBC since admission  Hb today is 8.7 ( around baseline). Iron studies normal, likely bone marrow suppression due to COVID  Occult blood negative. retic 0.54 , retic index indicating Hypoproliferation. Patient was seen by hematology, offered [facility] ( declined).    Dysphagia.      Elevated troponin likely demand ischemia    Signed:    Lilliana Dubon MD   2/3/2022      Thank you No primary care provider on file. for the opportunity to be involved in this patient's care. If you have any questions or concerns please feel free to contact me at [phone].